# Patient Record
Sex: FEMALE | Race: BLACK OR AFRICAN AMERICAN | NOT HISPANIC OR LATINO | Employment: PART TIME | URBAN - METROPOLITAN AREA
[De-identification: names, ages, dates, MRNs, and addresses within clinical notes are randomized per-mention and may not be internally consistent; named-entity substitution may affect disease eponyms.]

---

## 2017-01-17 ENCOUNTER — OFFICE VISIT (OUTPATIENT)
Dept: FAMILY MEDICINE | Facility: CLINIC | Age: 32
End: 2017-01-17
Payer: COMMERCIAL

## 2017-01-17 VITALS
RESPIRATION RATE: 16 BRPM | DIASTOLIC BLOOD PRESSURE: 84 MMHG | TEMPERATURE: 98.9 F | OXYGEN SATURATION: 100 % | WEIGHT: 262 LBS | BODY MASS INDEX: 37.51 KG/M2 | HEIGHT: 70 IN | HEART RATE: 106 BPM | SYSTOLIC BLOOD PRESSURE: 122 MMHG

## 2017-01-17 DIAGNOSIS — L85.3 DRY SKIN DERMATITIS: ICD-10-CM

## 2017-01-17 DIAGNOSIS — G35 MS (MULTIPLE SCLEROSIS) (H): ICD-10-CM

## 2017-01-17 DIAGNOSIS — A08.4 VIRAL GASTROENTERITIS: Primary | ICD-10-CM

## 2017-01-17 DIAGNOSIS — Z87.410 HISTORY OF CERVICAL DYSPLASIA: ICD-10-CM

## 2017-01-17 PROCEDURE — 99214 OFFICE O/P EST MOD 30 MIN: CPT | Performed by: FAMILY MEDICINE

## 2017-01-17 RX ORDER — TRIAMCINOLONE ACETONIDE 1 MG/G
CREAM TOPICAL
Qty: 15 G | Refills: 1 | Status: SHIPPED | OUTPATIENT
Start: 2017-01-17 | End: 2017-08-31

## 2017-01-17 NOTE — Clinical Note
[address] [address]  Phone: [phone]    January 17, 2017        Stefania Austin  [address] [facility]   [facility] [address]          To whom it may concern:    RE: Stefania Austin    Patient was seen and treated today at our clinic and missed work.    Please contact me for questions or concerns.      Sincerely,        Porsha Veliz MD

## 2017-01-17 NOTE — PATIENT INSTRUCTIONS
You can take imodium AD as needed for diarrhea.       [facility]    If you have any questions regarding to your visit please contact your care team:       Team Red:   Clinic Hours Telephone Number   Dr. Porsha Palmer  (pediatrics)  Stella Rosario NP 7am-7pm  Monday - Thursday   7am-5pm  Fridays  [phone] (fax)    Carlos PHILLIP  [phone]   Urgent Care - [place] and [place] Monday-Friday  [place] - 11am-8pm  Saturday-Sunday  Both sites - 9am-5pm  [phone] - [facility]  [phone] - [place]       What options do I have for visits at the clinic other than the traditional office visit?  To expand how we care for you, many of our providers are utilizing electronic visits (e-visits) and telephone visits, when medically appropriate, for interactions with their patients rather than a visit in the clinic.   We also offer nurse visits for many medical concerns. Just like any other service, we will bill your insurance company for this type of visit based on time spent on the phone with your provider. Not all insurance companies cover these visits. Please check with your medical insurance if this type of visit is covered. You will be responsible for any charges that are not paid by your insurance.      E-visits via [employer]:  generally incur a $35.00 fee.  Telephone visits:  Time spent on the phone: *charged based on time that is spent on the phone in increments of 10 minutes. Estimated cost:   5-10 mins $30.00   11-20 mins. $59.00   21-30 mins. $85.00     As always, Thank you for trusting us with your health care needs!              Viral Gastroenteritis (Adult)    Gastroenteritis is commonly called the stomach flu. It is most often caused by a virus that affects the stomach and intestinal tract and usually lasts from 2 to 7 days. Common viruses causing gastroenteritis include norovirus, rotavirus, and hepatitis A. Non-viral causes of  gastroenteritis include bacteria, parasites, and toxins.  The danger from repeated vomiting or diarrhea is dehydration. This is the loss of too much fluid from the body. When this occurs, body fluids must be replaced. Antibiotics do not help with this illness because it is usually viral.Simple home treatment will be helpful.  Symptoms of viral gastroenteritis may include:    Watery, loose stools    Stomach pain or abdominal cramps    Fever and chills    Nausea and vomiting    Loss of bowel control    Headache  Home care  Gastroenteritis is transmitted by contact with the stool or vomit of an infected person. This can occur from person to person or from contact with a contaminated surface.  Follow these guidelines when caring for yourself at home:    If symptoms are severe, rest at home for the next 24 hours or until you are feeling better.    Wash your hands with soap and water or use alcohol-based  to prevent the spread of infection. Wash your hands after touching anyone who is sick.    Wash your hands or use alcohol-based  after using the toilet and before meals. Clean the toilet after each use.  Remember these tips when preparing food:    People with diarrhea should not prepare or serve food to others. When preparing foods, wash your hands before and after.    Wash your hands after using cutting boards, countertops, knives, or utensils that have been in contact with raw food.    Keep uncooked meats away from cooked and ready-to-eat foods.  Medicine  You may use acetaminophen or NSAID medicines like ibuprofen or naproxen to control fever unless another medicine was given. If you have chronic liver or kidney disease, talk with your healthcare provider before using these medicines. Also talk with your provider if you've had a stomach ulcer or gastrointestinal bleeding. Don't give aspirin to anyone under 18 years of age who is ill with a fever. It may cause severe liver damage. Don't use NSAIDS is  you are already taking one for another condition (like arthritis) or are on aspirin (such as for heart disease or after a stroke).  If medicine for vomiting or diarrhea are prescribed, take these only as directed. Do not take over-the-counter medicines for vomiting or diarrhea unless instructed by your healthcare provider.  Diet  Follow these guidelines for food:    Water and liquids are important so you don't get dehydrated. Drink a small amount at a time or suck on ice chips if you are vomiting.    If you eat, avoid fatty, greasy, spicy, or fried foods.    Don't eat dairy if you have diarrhea. This can make diarrhea worse.    Avoid tobacco, alcohol, and caffeine which may worsen symptoms.  During the first 24 hours (the first full day), follow the diet below:    Beverages. Sports drinks, soft drinks without caffeine, ginger ale, mineral water (plain or flavored), decaffeinated tea and coffee. If you are very dehydrated, sports drinks aren't a good choice. They have too much sugar and not enough electrolytes. In this case, commercially available products called oral rehydration solutions, are best.    Soups. Eat clear broth, consommé, and bouillon.    Desserts. Eat gelatin, popsicles, and fruit juice bars.  During the next 24 hours (the second day), you may add the following to the above:    Hot cereal, plain toast, bread, rolls, and crackers    Plain noodles, rice, mashed potatoes, chicken noodle or rice soup    Unsweetened canned fruit (avoid pineapple), bananas    Limit fat intake to less than 15 grams per day. Do this by avoiding margarine, butter, oils, mayonnaise, sauces, gravies, fried foods, peanut butter, meat, poultry, and fish.    Limit fiber and avoid raw or cooked vegetables, fresh fruits (except bananas), and bran cereals.    Limit caffeine and chocolate. Don't use spices or seasonings other than salt.    Limit dairy products.    Avoid alcohol.  During the next 24 hours:    Gradually resume a normal  diet as you feel better and your symptoms improve.    If at any time it starts getting worse again, go back to clear liquids until you feel better.  Follow-up care  Follow up with your healthcare provider, or as advised. Call your provider if you don't get better within 24 hours or if diarrhea lasts more than a week. Also follow up if you are unable to keep down liquids and get dehydrated. If a stool (diarrhea) sample was taken, call as directed for the results.  Call 911  Call 911 if any of these occur:    Trouble breathing    Chest pain    Confused    Severe drowsiness or trouble awakening    Fainting or loss of consciousness    Rapid heart rate    Seizure    Stiff neck  When to seek medical advice  Call your healthcare provider right away if any of these occur:    Abdominal pain that gets worse    Continued vomiting (unable to keep liquids down)    Frequent diarrhea (more than 5 times a day)    Blood in vomit or stool (black or red color)    Dark urine, reduced urine output, or extreme thirst    Weakness or dizziness    Drowsiness    Fever of 100.4 F (38 C) oral or higher that does not get better with fever medicine    New rash    5827-8517 [employer] [address] [place] [id number]. All rights reserved. This information is not intended as a substitute for professional medical care. Always follow your healthcare professional's instructions.

## 2017-01-17 NOTE — MR AVS SNAPSHOT
After Visit Summary   1/17/2017    Stefania Austin    MRN: [id number]           Patient Information     Date Of Birth          1985        Visit Information        Provider Department      1/17/2017 4:00 PM Porsha Veliz MD [facility]        Today's Diagnoses     Viral gastroenteritis    -  1     Dry skin dermatitis         MS (multiple sclerosis) (H)         History of cervical dysplasia           Care Instructions    You can take imodium AD as needed for diarrhea.       [facility]    If you have any questions regarding to your visit please contact your care team:       Team Red:   Clinic Hours Telephone Number   Dr. Porsha Palmer  (pediatrics)  Stella Rosario NP 7am-7pm  Monday - Thursday   7am-5pm  Fridays  [phone] (fax)    Carlos PHILLIP  [phone]   Urgent Care - [place] and [place] Monday-Friday  [place] - 11am-8pm  Saturday-Sunday  Both sites - 9am-5pm  [phone] - [facility]  [phone] - [place]       What options do I have for visits at the clinic other than the traditional office visit?  To expand how we care for you, many of our providers are utilizing electronic visits (e-visits) and telephone visits, when medically appropriate, for interactions with their patients rather than a visit in the clinic.   We also offer nurse visits for many medical concerns. Just like any other service, we will bill your insurance company for this type of visit based on time spent on the phone with your provider. Not all insurance companies cover these visits. Please check with your medical insurance if this type of visit is covered. You will be responsible for any charges that are not paid by your insurance.      E-visits via [employer]:  generally incur a $35.00 fee.  Telephone visits:  Time spent on the phone: *charged based on time that is spent on the phone in increments of 10 minutes. Estimated  cost:   5-10 mins $30.00   11-20 mins. $59.00   21-30 mins. $85.00     As always, Thank you for trusting us with your health care needs!              Viral Gastroenteritis (Adult)    Gastroenteritis is commonly called the stomach flu. It is most often caused by a virus that affects the stomach and intestinal tract and usually lasts from 2 to 7 days. Common viruses causing gastroenteritis include norovirus, rotavirus, and hepatitis A. Non-viral causes of gastroenteritis include bacteria, parasites, and toxins.  The danger from repeated vomiting or diarrhea is dehydration. This is the loss of too much fluid from the body. When this occurs, body fluids must be replaced. Antibiotics do not help with this illness because it is usually viral.Simple home treatment will be helpful.  Symptoms of viral gastroenteritis may include:    Watery, loose stools    Stomach pain or abdominal cramps    Fever and chills    Nausea and vomiting    Loss of bowel control    Headache  Home care  Gastroenteritis is transmitted by contact with the stool or vomit of an infected person. This can occur from person to person or from contact with a contaminated surface.  Follow these guidelines when caring for yourself at home:    If symptoms are severe, rest at home for the next 24 hours or until you are feeling better.    Wash your hands with soap and water or use alcohol-based  to prevent the spread of infection. Wash your hands after touching anyone who is sick.    Wash your hands or use alcohol-based  after using the toilet and before meals. Clean the toilet after each use.  Remember these tips when preparing food:    People with diarrhea should not prepare or serve food to others. When preparing foods, wash your hands before and after.    Wash your hands after using cutting boards, countertops, knives, or utensils that have been in contact with raw food.    Keep uncooked meats away from cooked and ready-to-eat  foods.  Medicine  You may use acetaminophen or NSAID medicines like ibuprofen or naproxen to control fever unless another medicine was given. If you have chronic liver or kidney disease, talk with your healthcare provider before using these medicines. Also talk with your provider if you've had a stomach ulcer or gastrointestinal bleeding. Don't give aspirin to anyone under 18 years of age who is ill with a fever. It may cause severe liver damage. Don't use NSAIDS is you are already taking one for another condition (like arthritis) or are on aspirin (such as for heart disease or after a stroke).  If medicine for vomiting or diarrhea are prescribed, take these only as directed. Do not take over-the-counter medicines for vomiting or diarrhea unless instructed by your healthcare provider.  Diet  Follow these guidelines for food:    Water and liquids are important so you don't get dehydrated. Drink a small amount at a time or suck on ice chips if you are vomiting.    If you eat, avoid fatty, greasy, spicy, or fried foods.    Don't eat dairy if you have diarrhea. This can make diarrhea worse.    Avoid tobacco, alcohol, and caffeine which may worsen symptoms.  During the first 24 hours (the first full day), follow the diet below:    Beverages. Sports drinks, soft drinks without caffeine, ginger ale, mineral water (plain or flavored), decaffeinated tea and coffee. If you are very dehydrated, sports drinks aren't a good choice. They have too much sugar and not enough electrolytes. In this case, commercially available products called oral rehydration solutions, are best.    Soups. Eat clear broth, consommé, and bouillon.    Desserts. Eat gelatin, popsicles, and fruit juice bars.  During the next 24 hours (the second day), you may add the following to the above:    Hot cereal, plain toast, bread, rolls, and crackers    Plain noodles, rice, mashed potatoes, chicken noodle or rice soup    Unsweetened canned fruit (avoid  pineapple), bananas    Limit fat intake to less than 15 grams per day. Do this by avoiding margarine, butter, oils, mayonnaise, sauces, gravies, fried foods, peanut butter, meat, poultry, and fish.    Limit fiber and avoid raw or cooked vegetables, fresh fruits (except bananas), and bran cereals.    Limit caffeine and chocolate. Don't use spices or seasonings other than salt.    Limit dairy products.    Avoid alcohol.  During the next 24 hours:    Gradually resume a normal diet as you feel better and your symptoms improve.    If at any time it starts getting worse again, go back to clear liquids until you feel better.  Follow-up care  Follow up with your healthcare provider, or as advised. Call your provider if you don't get better within 24 hours or if diarrhea lasts more than a week. Also follow up if you are unable to keep down liquids and get dehydrated. If a stool (diarrhea) sample was taken, call as directed for the results.  Call 911  Call 911 if any of these occur:    Trouble breathing    Chest pain    Confused    Severe drowsiness or trouble awakening    Fainting or loss of consciousness    Rapid heart rate    Seizure    Stiff neck  When to seek medical advice  Call your healthcare provider right away if any of these occur:    Abdominal pain that gets worse    Continued vomiting (unable to keep liquids down)    Frequent diarrhea (more than 5 times a day)    Blood in vomit or stool (black or red color)    Dark urine, reduced urine output, or extreme thirst    Weakness or dizziness    Drowsiness    Fever of 100.4 F (38 C) oral or higher that does not get better with fever medicine    New rash    7179-7197 [employer]. [address] [place] [address], [place] [address]. All rights reserved. This information is not intended as a substitute for professional medical care. Always follow your healthcare professional's instructions.              Follow-ups after your visit        Follow-up notes from your care  team     Return in about 20 months (around 10/1/2018), or if symptoms worsen or fail to improve, for physical (fasting labs up to one week prior).      Your next 10 appointments already scheduled     Cayetano 15, 2017 11:30 AM   (Arrive by 11:15 AM)   MR CERVICAL SPINE W/O & W CONTRAST with [address] [facility] ([facility] [place])    [address] [facility]  1st Floor  [facility] [address] [phone]           Take your medicines as usual, unless your doctor tells you not to. Bring a list of your current medicines to your exam (including vitamins, minerals and over-the-counter drugs).  You will be given intravenous contrast for this exam. To prepare:   The day before your exam, drink extra fluids at least six 8-ounce glasses (unless your doctor tells you to restrict your fluids).   Have a blood test (creatinine test) within 30 days of your exam. Go to your clinic or Diagnostic Imaging Department for this test.  The MRI machine uses a strong magnet. Please wear clothes without metal (snaps, zippers). A sweatsuit works well, or we may give you a hospital gown.  Please remove any body piercings and hair extensions before you arrive. You will also remove watches, jewelry, hairpins, wallets, dentures, partial dental plates and hearing aids. You may wear contact lenses, and you may be able to wear your rings. We have a safe place to keep your personal items, but it is safer to leave them at home.   **IMPORTANT** THE INSTRUCTIONS BELOW ARE ONLY FOR THOSE PATIENTS WHO HAVE BEEN TOLD THEY WILL RECEIVE SEDATION OR GENERAL ANESTHESIA DURING THEIR MRI PROCEDURE:  IF YOU WILL RECEIVE SEDATION (take medicine to help you relax during your exam):   You must get the medicine from your doctor before you arrive. Bring the medicine to the exam. Do not take it at home.   Arrive one hour early. Bring someone who can take you home after the test. Your medicine will make you sleepy. After the exam, you may  not drive, take a bus or take a taxi by yourself.   No eating 8 hours before your exam. You may have clear liquids up until 4 hours before your exam. (Clear liquids include water, clear tea, black coffee and fruit juice without pulp.)  IF YOU WILL RECEIVE ANESTHESIA (be asleep for your exam):   Arrive 1 1/2 hours early. Bring someone who can take you home after the test. You may not drive, take a bus or take a taxi by yourself.   No eating 8 hours before your exam. You may have clear liquids up until 4 hours before your exam. (Clear liquids include water, clear tea, black coffee and fruit juice without pulp.)  Please call the Imaging Department at your exam site with any questions.            Cayetano 15, 2017 12:15 PM   (Arrive by 12:00 PM)   MR BRAIN W/O & W CONTRAST with [address] MRI ([email or web address] and [facility] [place])    [address] [address] [phone]           Take your medicines as usual, unless your doctor tells you not to. Bring a list of your current medicines to your exam (including vitamins, minerals and over-the-counter drugs).  You will be given intravenous contrast for this exam. To prepare:   The day before your exam, drink extra fluids at least six 8-ounce glasses (unless your doctor tells you to restrict your fluids).   Have a blood test (creatinine test) within 30 days of your exam. Go to your clinic or Diagnostic Imaging Department for this test.  The MRI machine uses a strong magnet. Please wear clothes without metal (snaps, zippers). A sweatsuit works well, or we may give you a hospital gown.  Please remove any body piercings and hair extensions before you arrive. You will also remove watches, jewelry, hairpins, wallets, dentures, partial dental plates and hearing aids. You may wear contact lenses, and you may be able to wear your rings. We have a safe place to keep your personal items, but it is safer to leave them at home.    **IMPORTANT** THE INSTRUCTIONS BELOW ARE ONLY FOR THOSE PATIENTS WHO HAVE BEEN TOLD THEY WILL RECEIVE SEDATION OR GENERAL ANESTHESIA DURING THEIR MRI PROCEDURE:  IF YOU WILL RECEIVE SEDATION (take medicine to help you relax during your exam):   You must get the medicine from your doctor before you arrive. Bring the medicine to the exam. Do not take it at home.   Arrive one hour early. Bring someone who can take you home after the test. Your medicine will make you sleepy. After the exam, you may not drive, take a bus or take a taxi by yourself.   No eating 8 hours before your exam. You may have clear liquids up until 4 hours before your exam. (Clear liquids include water, clear tea, black coffee and fruit juice without pulp.)  IF YOU WILL RECEIVE ANESTHESIA (be asleep for your exam):   Arrive 1 1/2 hours early. Bring someone who can take you home after the test. You may not drive, take a bus or take a taxi by yourself.   No eating 8 hours before your exam. You may have clear liquids up until 4 hours before your exam. (Clear liquids include water, clear tea, black coffee and fruit juice without pulp.)  Please call the Imaging Department at your exam site with any questions.            Cayetano 15, 2017  1:30 PM   (Arrive by 1:15 PM)   Return Multiple Sclerosis with Tico Peace MD   [facility] Multiple Sclerosis ([facility])    [address] [place] [address] [phone]              Who to contact     If you have questions or need follow up information about today's clinic visit or your schedule please contact [facility] directly at [phone].  Normal or non-critical lab and imaging results will be communicated to you by MyChart, letter or phone within 4 business days after the clinic has received the results. If you do not hear from us within 7 days, please contact the clinic through MyChart or phone. If you have a critical or abnormal lab  "result, we will notify you by phone as soon as possible.  Submit refill requests through [employer] or call your pharmacy and they will forward the refill request to us. Please allow 3 business days for your refill to be completed.          Additional Information About Your Visit        [employer] Information     [employer] gives you secure access to your electronic health record. If you see a primary care provider, you can also send messages to your care team and make appointments. If you have questions, please call your primary care clinic.  If you do not have a primary care provider, please call [phone] and they will assist you.        Care EveryWhere ID     This is your Care EveryWhere ID. This could be used by other organizations to access your [facility] medical records  [id number]        Your Vitals Were     Pulse Temperature Respirations Height BMI (Body Mass Index) Pulse Oximetry    106 98.9  F (37.2  C) 16 5' 10\" (1.778 m) 37.59 kg/m2 100%       Blood Pressure from Last 3 Encounters:   01/17/17 122/84   12/15/16 130/83   07/20/16 114/74    Weight from Last 3 Encounters:   01/17/17 262 lb (118.842 kg)   12/15/16 254 lb 14.4 oz (115.622 kg)   07/20/16 257 lb (116.574 kg)              Today, you had the following     No orders found for display         Today's Medication Changes          These changes are accurate as of: 1/17/17  4:55 PM.  If you have any questions, ask your nurse or doctor.               Start taking these medicines.        Dose/Directions    triamcinolone 0.1 % cream   Commonly known as:  KENALOG   Used for:  Dry skin dermatitis   Started by:  Porsha Veliz MD        Apply to affected area sparingly twice daily as needed   Quantity:  15 g   Refills:  1            Where to get your medicines      These medications were sent to [facility] - [address] [facility]  [address] [facility] [address], [place] [address]     Phone:  [phone]    - triamcinolone 0.1 % " cream             Primary Care Provider Office Phone # Fax #    Porsha Veliz [phone]       [address]  [place] [address]        Thank you!     Thank you for choosing [facility]  for your care. Our goal is always to provide you with excellent care. Hearing back from our patients is one way we can continue to improve our services. Please take a few minutes to complete the written survey that you may receive in the mail after your visit with us. Thank you!             Your Updated Medication List - Protect others around you: Learn how to safely use, store and throw away your medicines at www.disposemymeds.org.          This list is accurate as of: 1/17/17  4:55 PM.  Always use your most recent med list.                   Brand Name Dispense Instructions for use    triamcinolone 0.1 % cream    KENALOG    15 g    Apply to affected area sparingly twice daily as needed

## 2017-01-17 NOTE — NURSING NOTE
"Chief Complaint   Patient presents with     Sick       Initial /84 mmHg  Pulse 106  Temp(Src) 98.9  F (37.2  C)  Resp 16  Ht 5' 10\" (1.778 m)  Wt 262 lb (118.842 kg)  BMI 37.59 kg/m2  SpO2 100% Estimated body mass index is 37.59 kg/(m^2) as calculated from the following:    Height as of this encounter: 5' 10\" (1.778 m).    Weight as of this encounter: 262 lb (118.842 kg).  BP completed using cuff size: large right arm    Susy Paul. MA      "

## 2017-01-17 NOTE — PROGRESS NOTES
"  SUBJECTIVE:                                                    Stefania Austin is a 31 year old obese female with MS who presents to clinic today for the following health issues:    Acute Illness   Acute illness concerns: vomiting   Onset: 1 day     Fever: no     Chills/Sweats: YES - chills     Headache (location?): YES    Sinus Pressure:no    Conjunctivitis:  no    Ear Pain: no    Rhinorrhea: no    Congestion: no    Sore Throat: YES     Cough: no    Wheeze: no     Decreased Appetite: no     Nausea: YES    Vomiting: YES    Diarrhea:  YES    Dysuria/Freq.: no     Fatigue/Achiness: YES    Sick/Strep Exposure: YES -  kids     Therapies Tried and outcome: sleep    I have reviewed the patient's medical history in detail and updated the computerized patient record.     ROS:  CONSTITUTIONAL:POSITIVE  for chills  INTEGUMENTARY/SKIN: pink itchy lesion on right thigh   E/M: NEGATIVE for ear, mouth and throat problems  R: NEGATIVE for significant cough or SOB  GI: see HPI   : NEGATIVE for frequency, dysuria, or hematuria  M: NEGATIVE for significant arthralgias or myalgia  NEURO: MS     OBJECTIVE:                                                    /84 mmHg  Pulse 106  Temp(Src) 98.9  F (37.2  C)  Resp 16  Ht 5' 10\" (1.778 m)  Wt 262 lb (118.842 kg)  BMI 37.59 kg/m2  SpO2 100%  Body mass index is 37.59 kg/(m^2).  GENERAL: alert, no distress and obese  EYES: Eyes grossly normal to inspection, PERRL and conjunctivae and sclerae normal  HENT: ear canals and TM's normal, nose and mouth without ulcers or lesions  NECK: no adenopathy, no asymmetry, masses, or scars and thyroid normal to palpation  RESP: lungs clear to auscultation - no rales, rhonchi or wheezes  CV: regular rate and rhythm, normal S1 S2, no S3 or S4, no murmur, click or rub, no peripheral edema and peripheral pulses strong  ABDOMEN: soft, nontender, no hepatosplenomegaly, no masses and bowel sounds normal  MS: no gross musculoskeletal " defects noted, no edema  SKIN: fine pink plaque without scale or central clearing on right anterior thigh   PSYCH: mentation appears normal, affect normal/bright    Diagnostic Test Results:  Results for orders placed or performed in visit on 12/15/16   MRI Brain w & w/o contrast    Narrative    Brain MR without and with contrast     History: Multiple sclerosis    Comparison: Brain MRI 6/9/2016      Technique: Sagittal T2 FLAIR, axial T2 FLAIR, axial diffusion  weighted, and axial T1-weighted images of the brain were obtained  without the administration of intravenous contrast. After the  administration of intravenous contrast, axial T2-weighted, axial T2*,  axial and coronal T1-weighted, and coronal T2 FLAIR images of the  brain were obtained.    Contrast: 10mL Gadavist    Findings:   Greater than 20 foci of patchy and confluent T2 hyperintense foci  within the periventricular, deep and subcortical white matter of both  cerebral hemispheres. Many of these lesions demonstrate radial  orientation to the ventricular system, compatible with history of  multiple sclerosis. Numerous patchy T2 hyperintense foci within the  bilateral middle cerebellar peduncles and brainstem, left greater than  right. Stable precontrast T1 hyperintense/T2 hyperintense focus within  the left aspect of the tectum, presumably sequelae of chronic  demyelinating lesion.     New punctate nonenhancing T2 hyperintense focus in the left frontal  centrum semiovale (series 4, image 18).     New 6 mm nodular focus of enhancement and T2 hyperintense signal  abnormality in the posterior right frontal periventricular white  matter (4, image 17).     New 3 mm enhancing lesion within the anterior left frontal subcortical  white matter (series 13, image 7).    Resolution of previously seen incomplete rim-enhancing lesions within  the right parieto-occipital periventricular white matter.     No significant parenchymal volume loss. No abnormal foci of marked  T1  hypointensity. Ventricles are not enlarged out of proportion to the  cerebral sulci. Patent major intracranial vascular flow voids. No  intracranial hemorrhage, mass effect or abnormal extra-axial fluid  collection.    Right maxillary sinus polypoid mucosal thickening. Mastoid air cells  are clear. Orbits are unremarkable.      Impression    Impression: Findings compatible with history of multiple sclerosis.  New right frontal periventricular and subcortical enhancing lesions  and nonenhancing left frontal T2 hyperintense lesion, consistent with  interval/active demyelination since 6/9/2016. Resolution of previously  seen right right parieto-occipital enhancing demyelinating lesions.       PANCHO ANAYA MD        ASSESSMENT/PLAN:                                                    (A08.4) Viral gastroenteritis  (primary encounter diagnosis)  Plan: She is instructed to push clear fluids and small amounts frequently until improving, then advance diet as tolerated. May use Gatorade for rehydration and Immodium OTC as needed for diarrhea. Call or return for office visit as needed if symptoms are not responding as expected, or for high fever, significant abdominal pain, or bloody stool.     (L85.3) Dry skin dermatitis  Plan: triamcinolone (KENALOG) 0.1 % cream        Call or return to clinic as needed if these symptoms worsen or fail to improve as anticipated.     (G35) MS (multiple sclerosis) (H)  Plan: follow-up with neurology as planned     (Z87.410) History of cervical dysplasia  Plan: pap smear due in 10/2018     See Patient Instructions    Porsha Veliz MD  [facility]

## 2017-02-02 ENCOUNTER — TELEPHONE (OUTPATIENT)
Dept: NEUROLOGY | Facility: CLINIC | Age: 32
End: 2017-02-02

## 2017-02-02 NOTE — TELEPHONE ENCOUNTER
Prior Authorization Specialty Medication Request    Medication/Dose: Plegridy 125mcg  Diagnosis and ICD: Relapsing Remitting Multiple Sclerosis, G35  New/Renewal/Insurance Change PA: Insurance change    Important Lab Values: n/a    Previously Tried and Failed Therapies: Copaxone     Rationale: Continuation of current disease modifying therapy for demyelinating disease, currently clinically stable on, please approve.    Would you like to include any research articles? n/a   If yes please include the hyperlink(s) below or fax @ [phone].    (Include Name and MRN)    If you received a fax notification from an outside Pharmacy;  Pharmacy Name: n/a  Pharmacy #: n/a  Pharmacy Fax: n/a    CoverMyMeds Key: [id number]

## 2017-02-02 NOTE — TELEPHONE ENCOUNTER
[facility] Prior Authorization Team   Phone: [phone]  Fax: [phone]    [place] Initiation    Medication: Plegridy 125mcg  Insurance Company: [employer] [facility] - Phone [phone] Fax [phone]  Pharmacy Filling the Rx: [facility] - [place], [place] - [address]  Filling Pharmacy Phone: [phone]  Filling Pharmacy Fax: [phone]  Start Date: 2/2/2017

## 2017-02-07 NOTE — TELEPHONE ENCOUNTER
PRIOR AUTHORIZATION DENIED    Medication: Plegridy 125mcg - Denied    Denial Date: 2/3/2017    Denial Rational: ***    Appeal Information: ***

## 2017-02-09 NOTE — TELEPHONE ENCOUNTER
Prior Authorization Approval    Authorization Effective Date: 2/8/2017  Authorization Expiration Date: 2/8/2019  Medication: Plegridy 125mcg APPROVED  Approved Dose/Quantity: 2 PER 28 DAYS  Reference #:  [id number]  Insurance Company: [employer] - Phone [phone] Fax [phone]  Expected CoPay:       CoPay Card Available:      Foundation Assistance Needed:    Which Pharmacy is filling the prescription (Not needed for infusion/clinic administered): [facility] - [place] [address]  Pharmacy Notified: Yes  Patient Notified: Yes, detailed vm

## 2017-03-10 ENCOUNTER — TELEPHONE (OUTPATIENT)
Dept: NEUROLOGY | Facility: CLINIC | Age: 32
End: 2017-03-10

## 2017-03-10 NOTE — TELEPHONE ENCOUNTER
I received the following message from the triage nurse:    SE are rash-red itchy at injection site after each injection. She does rotate sites but gets rash every time. Has tried cream and didn't help.     I called Stefania to get additional information. Unfortunately Stefania did not answer. I left a VMM with our clinic number to call us back. Will route to Dr. Peace for input.    Chantale Ceja, MS RN Care Coordinator

## 2017-03-10 NOTE — TELEPHONE ENCOUNTER
1) If she has not done so already, she should contact the [employer] nurse to get their input.    2) If she is not doing so, she should try icing the injection sites after injection (put some ice in a paper towel and hold against the skin until the skin is cool).     3) She could also try taking an over the counter anti-histamine such as Claritin (one pill) 30 minutes prior to injection to cut down on itching. (Benadryl can be used too, but is sedating.)    If she has already tried all of the above and it is still bothering her, then she has to decide if the injection site reactions are bothering her enough that she wants to switch drugs. Almost all patients on Copaxone have some degree of injection site reactions, and usually they can be managed with conservative measures as above, but some people find them much more bothersome than others. The downside of contemplating a change is that all of the other drugs require regular blood test monitoring and have more potential side effects.

## 2017-03-13 NOTE — TELEPHONE ENCOUNTER
I called Stefania to talk with her about Dr. Peace's input. Unfortunately Stefania did not answer. I left a VMM with Dr. Peace's input along with the number for [employer]. I also left her with our clinic call back number if she had any other questions or concerns.     Chantale Ceja, MS RN Care Coordinator

## 2017-04-03 ENCOUNTER — TELEPHONE (OUTPATIENT)
Dept: NEUROLOGY | Facility: CLINIC | Age: 32
End: 2017-04-03

## 2017-04-03 NOTE — TELEPHONE ENCOUNTER
I received a message stating that our [facility] was trying to reach us to discuss side effect management for Stefania with her Plegridy; We had tried reaching her earlier this month regarding this management, but she never answered her phone, nor did she call our clinic back; I called the pharmacy back, and they ultimately have had the same issue, she called asking about these side effects, but never returned their call; They will reach out to her to go through with her Dr Peace's recommendations.    Luz Shirley, MS RN Care Coordinator

## 2017-06-12 ENCOUNTER — TELEPHONE (OUTPATIENT)
Dept: NEUROLOGY | Facility: CLINIC | Age: 32
End: 2017-06-12

## 2017-06-12 NOTE — TELEPHONE ENCOUNTER
I received the following message from the call center:    Pt is requesting a call back to discuss her medications. She stated that she has questions.    I called Stefania to see what her questions were. Unfortunately Stefania did not answer and her VMM box was not set up. I will attempt to call Stefania tomorrow.     Chantale Ceja MS RN Care Coordinator

## 2017-06-13 NOTE — TELEPHONE ENCOUNTER
I attempted to call Stefania back today to see what questions she had regarding her medication. I was unable to contact Stefania and unable to leave a VMM, as her VM box was not set up.    Chantale Ceja MS RN Care Coordinator

## 2017-08-12 ENCOUNTER — HEALTH MAINTENANCE LETTER (OUTPATIENT)
Age: 32
End: 2017-08-12

## 2017-08-31 ENCOUNTER — OFFICE VISIT (OUTPATIENT)
Dept: NEUROLOGY | Facility: CLINIC | Age: 32
End: 2017-08-31
Attending: PSYCHIATRY & NEUROLOGY
Payer: COMMERCIAL

## 2017-08-31 VITALS — HEIGHT: 70 IN | SYSTOLIC BLOOD PRESSURE: 124 MMHG | HEART RATE: 75 BPM | DIASTOLIC BLOOD PRESSURE: 81 MMHG

## 2017-08-31 DIAGNOSIS — G35 MS (MULTIPLE SCLEROSIS) (H): Primary | ICD-10-CM

## 2017-08-31 PROCEDURE — 99211 OFF/OP EST MAY X REQ PHY/QHP: CPT | Mod: ZF

## 2017-08-31 RX ORDER — PEGINTERFERON BETA-1A 125 UG/.5ML
INJECTION, SOLUTION SUBCUTANEOUS
COMMUNITY
Start: 2017-08-18 | End: 2018-01-11

## 2017-08-31 ASSESSMENT — PAIN SCALES - GENERAL: PAINLEVEL: NO PAIN (0)

## 2017-08-31 NOTE — LETTER
8/31/2017      RE: Stefania Austin  [address]   [facility] [address]     Referral source: Established patient     Chief complaint: Multiple sclerosis.      Assisted by:  YADI Kinney DO, PGY-4 Neurology resident.      History of the Present Illness: Ms. Stefania Austin is a 32-year-old woman who returns to the Multiple Sclerosis Clinic today for regularly scheduled follow-up for diagnosis of relapsing-remitting multiple sclerosis.  I was assisted in this evaluation today by Dr. Kinney and his documentation should be considered integral to this note.      The patient's history is as per my previous notes.  She presented with an episode of facial weakness in the summer of 2015 and a sixth nerve palsy in November 2015 Subsequent MRI imaging and CSF examination were consistent with a diagnosis of multiple sclerosis.  She started disease-modifying therapy with glatiramer acetate in mid-2016, but follow-up MRI imaging 6 months later demonstrated ongoing accumulation of active inflammatory demyelinating lesions, and at that time we decided to transition her to the Plegridy formulation of interferon beta.      The patient remains on Plegridy.  She reports good compliance with the medication.  She takes ibuprofen prior to the injections and is not finding flu-like side effects to be particularly distressing, although she is bothered by injection site reactions after the shots.      I reviewed MRI scans of the brain and cervical spine performed on 08/28/2017.  MRI scan of the brain demonstrates several periventricular and juxtacortical foci of T2 hyperintensity in the white matter of the cerebral hemispheres with additional T2 hyperintensity at the left pontine/middle cerebellar peduncle junction that are unchanged from previous MRI of 12/15/2016.  There has been interval resolution of enhancing signal abnormality in the right posterior frontal white matter in comparison to the earlier study.  A  single possibly enhancing focus is noted at the right mesencephalic/pontine junction, but is not correlated with any clear T2 signal abnormality and may be artifactual.  No other abnormal enhancement is seen.  MRI scan of the cervical spine demonstrates a probable focus of demyelination at C5 that is less well appreciated on this study than on the previous image of 12/15/2016.  There are no new lesions or abnormal enhancement pattern on the current study.      Review of Systems: The patient continues to experience urinary urgency with occasional urge-associated urinary incontinence.  She has not tried medication for this condition.      Past Medical History:  1.  Iron-deficiency anemia.   2.  Hyperlipidemia.   3.  Cervical dysplasia.   4.  Vitamin D deficiency.   5.  Status post thymectomy for benign thymic hyperplasia.     Physical examination is as per Dr. Kinney and was nonfocal.      Assessment/plan:    1.  Relapsing-remitting multiple sclerosis      The patient remains clinically stable as regards any new evidence of MS relapse.  I do not see compelling evidence of new inflammatory demyelinating disease activity on her recent MRI either.      She is having some difficulty tolerating treatment with Plegridy, primarily due to painful injection site reactions.  I discussed several things that she could try to attempt to limit the discomfort after injections, including trying an antihistamine such as diphenhydramine or loratadine as well as icing the injection sites after each shot.  However, it is not likely that any strategy is going to make the injection site reactions go away altogether.      I also discussed with her whether to consider an alternative disease-modifying therapy.  She is considering future pregnancy in the future and, accordingly, fingolimod and teriflunomide are likely not the best choices for her given the known or strongly suspected teratogenicity of these agents.     I did speak to her  about dimethyl fumarate.  Again, one advantage of this agent is that it has a very short half-life and does not require a washout prior to planned conception, although we would not advise its use during pregnancy, as for all disease-modifying  therapies in general.  Common side effects include flushing, which is usually not more than a nuisance, as well as gastrointestinal side effects can occasionally be severe and limiting to tolerance of the medication.  There is also an association of dimethyl fumarate treatment with a few cases of a serious and potentially fatal viral infection of the brain called progressive multifocal leukoencephalopathy (PML).  All cases to date have occurred in patients who developed some degree of lymphopenia on this medication, which is not a universal occurrence with this drug.  I cannot state confidently that there is no risk in patients whose blood counts remain normal on dimethyl fumarate, but this risk (if present) appears to be quite small.      She also asked about possibility of being off of disease-modifying therapy.  I told the patient that I would not advise this based on the amount of inflammatory disease activity seen previously on her MRI.  I think that there is at least a 90% chance that she would have additional clinical relapses of multiple sclerosis on no treatment, and this could be associated with accumulation of substantial functional disability over time.      After discussion, she elects to remain on Plegridy at present, and I think that is an appropriate decision.  We will check studies for routine laboratory monitoring today to include vitamin D, liver function tests and             blood counts and we will contact her with those results when available.  She will return to clinic in 6 months for a review.     Tico Peace MD   of Neurology  [facility]    Cc:  Porsha Veliz MD  (PCP)  Patient

## 2017-08-31 NOTE — MR AVS SNAPSHOT
After Visit Summary   8/31/2017    Stefania Austin    MRN: [id number]           Patient Information     Date Of Birth          1985        Visit Information        Provider Department      8/31/2017 9:00 AM Tico Peace MD [facility]        Today's Diagnoses     MS (multiple sclerosis) (H)    -  1      Care Instructions    1. Continue Plegridy    2. You can try taking Benadryl (diphenhydramine) 25 mg or Claritin (loratadine) 10 mg after injections to see if this is helpful with injection site reactions    3. Labs (blood work) today    4. Return to clinic in six months          Follow-ups after your visit        Follow-up notes from your care team     Return in about 6 months (around 2/28/2018).      Your next 10 appointments already scheduled     Sep 01, 2017  7:15 PM CDT   Lab with  LAB   [facility] ([facility])    [address]  1st Floor  [facility] [address]   [phone]            Sep 12, 2017  5:40 PM CDT   PHYSICAL with Porsha Veliz MD   [facility] ([facility]    [address] [place] [address]   [phone]            Mar 01, 2018  4:30 PM CST   (Arrive by 4:15 PM)   Return Multiple Sclerosis with Tico Peace MD   [facility] ([facility])    [address] [place] [address]  3rd Floor  [facility] [address]   [phone]              Future tests that were ordered for you today     Open Future Orders        Priority Expected Expires Ordered    CBC with platelets differential Routine 8/31/2017 11/30/2017 8/31/2017    Hepatic panel Routine 8/31/2017 11/30/2017 8/31/2017    Vitamin D Deficiency Routine 8/31/2017 11/30/2017 8/31/2017            Who to contact     If you have questions or need follow up information about today's clinic visit or your schedule please contact [facility] directly at [phone].  Normal or  "non-critical lab and imaging results will be communicated to you by MyChart, letter or phone within 4 business days after the clinic has received the results. If you do not hear from us within 7 days, please contact the clinic through [facility] or phone. If you have a critical or abnormal lab result, we will notify you by phone as soon as possible.  Submit refill requests through [employer] or call your pharmacy and they will forward the refill request to us. Please allow 3 business days for your refill to be completed.          Additional Information About Your Visit        [employer] Information     [employer] gives you secure access to your electronic health record. If you see a primary care provider, you can also send messages to your care team and make appointments. If you have questions, please call your primary care clinic.  If you do not have a primary care provider, please call [phone] and they will assist you.        Care EveryWhere ID     This is your Care EveryWhere ID. This could be used by other organizations to access your [facility] medical records  [id number]        Your Vitals Were     Pulse Height                75 1.778 m (5' 10\")           Blood Pressure from Last 3 Encounters:   08/31/17 124/81   01/17/17 122/84   12/15/16 130/83    Weight from Last 3 Encounters:   01/17/17 118.8 kg (262 lb)   12/15/16 115.6 kg (254 lb 14.4 oz)   07/20/16 116.6 kg (257 lb)               Primary Care Provider Office Phone # Fax #    Porsha Veliz [phone] [address] [facility] [address]        Equal Access to Services     [facility]: Hadii aad ku hadasho Soomaali, waaxda luqadaha, qaybta kaalmada analia, malcolm pereira . So [facility] [phone].    ATENCIÓN: Si habla español, tiene a hurtado disposición servicios gratuitos de asistencia lingüística. Llame al [phone].    We comply with applicable federal civil rights laws and [place] laws. We do not " discriminate on the basis of race, color, national origin, age, disability sex, sexual orientation or gender identity.            Thank you!     Thank you for choosing [facility]  for your care. Our goal is always to provide you with excellent care. Hearing back from our patients is one way we can continue to improve our services. Please take a few minutes to complete the written survey that you may receive in the mail after your visit with us. Thank you!             Your Updated Medication List - Protect others around you: Learn how to safely use, store and throw away your medicines at www.disposemymeds.org.          This list is accurate as of: 8/31/17 10:16 AM.  Always use your most recent med list.                   Brand Name Dispense Instructions for use Diagnosis    PLEGRIDY 125 MCG/0.5ML Sopn prefilled pen   Generic drug:  peginterferon beta-1a

## 2017-08-31 NOTE — PATIENT INSTRUCTIONS
1. Continue Plegridy    2. You can try taking Benadryl (diphenhydramine) 25 mg or Claritin (loratadine) 10 mg after injections to see if this is helpful with injection site reactions    3. Labs (blood work) today    4. Return to clinic in six months

## 2017-08-31 NOTE — NURSING NOTE
"Chief Complaint   Patient presents with     RECHECK     Multiple Sclerosis       Initial /81  Pulse 75  Ht 1.778 m (5' 10\") Estimated body mass index is 37.59 kg/(m^2) as calculated from the following:    Height as of 1/17/17: 1.778 m (5' 10\").    Weight as of 1/17/17: 118.8 kg (262 lb).  Medication Reconciliation: complete   Horacio Bradley, DANIKA    "

## 2017-08-31 NOTE — PROGRESS NOTES
[facility] [place]  Neurology [facility]    Patient Name:  Stefania Austin  MRN:  [id number]    :  1985  Date of Service:  2017        History of Present Illness:   Stefania Austin is a 32 year old female who presents with Diagnosed RRMS who had initial presentation in  with seventh nerve palsy and later a sixth nerve palsy.  MRI and CSF (+oligoclonal bands) was suggestive for MS.  She was started on Glatiramer acetate intially but continued to have new symptoms with MRI lesions burden increase.  She was switched to Plegridy in 2016 and has continued on this medication up unto this follow up visit.    Interval History:  The patient reports no new visual symptoms, nor weakness, sensory changes, or moments of imbalance. She indicates the injections are leaving marks as well as puffing up her skin for up to one day after the delivery.  The previous redness and itching has subsided, but she is wondering if anything can be done about this.  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  She isn't necessarily interested in switching medications unless absolutely indicated due to her concern of risk of infection with immunosuppression.  Her bladder symptoms have continued, and she describes being unable to make it to the bathroom in time to urinate.  This is not painful, but she feels socially embarrassed about her condition.  She has yet to try the medications previously recommended and will consider this if the symptoms continue to progress, as she feels this is more a nuisance than a condition she would like to medicate with.    ROS:   A 10-point ROS is negative unless indicated above.      Allergies:  No Known Allergies    Medications:    Current Outpatient Prescriptions   Medication     PLEGRIDY 125 MCG/0.5ML SOPN prefilled pen     Past  "Medical/Surgical History:  Past Medical History:   Diagnosis Date     ASCUS with positive high risk HPV 2012     Bell's palsy      Cervical dysplasia 2012     Genital herpes      Hyperlipidemia LDL goal < 160      Hypertension goal BP (blood pressure) < 140/90      Major depression, single episode      Microcytic anemia      MS (multiple sclerosis) (H) 12/3/2015     Obesity 5/20/2014     Vitamin D deficiency      Past Surgical History:   Procedure Laterality Date     DAVINCI THYMECTOMY N/A 5/9/2016    Procedure: DAVINCI THYMECTOMY;  Surgeon: Leighton Estrella MD;  Location:  OR     Physical Examination:    Vitals: /81  Pulse 75  Ht 1.778 m (5' 10\")  General: Cooperative, NAD  HEENT: Sclerae anicteric, MMM, neck supple   Cardiac: RRR, no m/r/g  Chest: CTAB, no wheezes or crackles  Abdomen: S/NT/ND  Extremities: Distal pulses intact. No LE edema.    Neurologic:     Mental Status: Fully alert, attentive and oriented. Speech clear and fluent, comprehension intact. No neglect or extinction.     Cranial Nerves: Visual fields intact. PERRL. EOMI with normal smooth pursuit. Facial sensation intact/symmetric. Facial movements symmetric. Hearing not formally tested but intact to conversation. Palate elevation symmetric, uvula midline. No dysarthria. Shoulder shrug strong bilaterally. Tongue protrusion midline.     Motor: No involuntary movements observed. Normal tone. No pronator drift. Strength 5/5 throughout upper and lower extremities. Normal rapid finger tapping.     Deep Tendon Reflexes: 2+ and symmetric throughout. No clonus. Toes downgoing.     Sensory: Light touch, pinprick, vibratory and proprioceptive sensation intact/symmetric throughout upper and lower extremities. Negative Romberg.      Coordination: ROGER, FNF and HS intact without dysmetria.     Station/Gait: Normal casual gait.     Pertinent Investigations:  All labs and imaging reviewed.  MRI brain 8/28/2017: while there is a subtle image " finding of contrast enhancement in the left lindsay, it is relatively minimal and without T2 signal correlation.      Assessment and Plan:  # RRMS: the patient has a new lesion reported on MRI today while on Plegridy, but we wouldn't think this is representative for treatment failure, and more imaging will be needed to fully determine if this enhancing lesions was real or not.  Her tolerance of the medication could be improved with use of Claritin or antihistamine, and we advised the patient that if she didn't want to switch to Tecfidera at this time that she should continue with Plegridy instead of going off medications.  She was informed that considering her previous imaging an symptoms, she would have a 90-95% to have further lesion development and likely a lesion that would cause a neurological deficit.  She was in agreement to continue with current Plegridy dosing.  Plan to repeat imaging in six months with six month follow up.  - Continue Plegridy  - CMP, CBC  - MRI w/wout contrast brain and c-spine in 6 months    # Vitamin D deficiency: previous levels 28 on 7/2016. Patient on supplementation (4000 units per day) but may robinson to be adjusted if levels are still low.   - Vitamin D deficiency screen    # Urge incontinence: again we offered medication treatment but the patient would like to hold off on this for now.  - We are available to prescribe Ditropan if needed      Patient was seen and discussed with attending neurologist, MD Saloni Dunbar  PGY4 Neurology resident    Attending physician: I saw and evaluated the patient with the resident and I agree with his findings and plan of care as documented above. Please see my note for additional details.    Tico Peace MD

## 2017-09-01 NOTE — PROGRESS NOTES
Referral source: Established patient     Chief complaint: Multiple sclerosis.      Assisted by:  YADI Kinney DO, PGY-4 Neurology resident.      History of the Present Illness: Ms. Stefania Austin is a 32-year-old woman who returns to the [facility]clerosis Clinic today for regularly scheduled follow-up for diagnosis of relapsing-remitting multiple sclerosis.  I was assisted in this evaluation today by Dr. Kinney and his documentation should be considered integral to this note.      The patient's history is as per my previous notes.  She presented with an episode of facial weakness in the summer of 2015 and a sixth nerve palsy in November 2015 Subsequent MRI imaging and CSF examination were consistent with a diagnosis of multiple sclerosis.  She started disease-modifying therapy with glatiramer acetate in mid-2016, but follow-up MRI imaging 6 months later demonstrated ongoing accumulation of active inflammatory demyelinating lesions, and at that time we decided to transition her to the Plegridy formulation of interferon beta.      The patient remains on Plegridy.  She reports good compliance with the medication.  She takes ibuprofen prior to the injections and is not finding flu-like side effects to be particularly distressing, although she is bothered by injection site reactions after the shots.      I reviewed MRI scans of the brain and cervical spine performed on 08/28/2017.  MRI scan of the brain demonstrates several periventricular and juxtacortical foci of T2 hyperintensity in the white matter of the cerebral hemispheres with additional T2 hyperintensity at the left pontine/middle cerebellar peduncle junction that are unchanged from previous MRI of 12/15/2016.  There has been interval resolution of enhancing signal abnormality in the right posterior frontal white matter in comparison to the earlier study.  A single possibly enhancing focus is noted at the right mesencephalic/pontine junction, but  is not correlated with any clear T2 signal abnormality and may be artifactual.  No other abnormal enhancement is seen.  MRI scan of the cervical spine demonstrates a probable focus of demyelination at C5 that is less well appreciated on this study than on the previous image of 12/15/2016.  There are no new lesions or abnormal enhancement pattern on the current study.      Review of Systems: The patient continues to experience urinary urgency with occasional urge-associated urinary incontinence.  She has not tried medication for this condition.      Past Medical History:  1.  Iron-deficiency anemia.   2.  Hyperlipidemia.   3.  Cervical dysplasia.   4.  Vitamin D deficiency.   5.  Status post thymectomy for benign thymic hyperplasia.     Physical examination is as per Dr. Kinney and was nonfocal.      Assessment/plan:    1.  Relapsing-remitting multiple sclerosis      The patient remains clinically stable as regards any new evidence of MS relapse.  I do not see compelling evidence of new inflammatory demyelinating disease activity on her recent MRI either.      She is having some difficulty tolerating treatment with Plegridy, primarily due to painful injection site reactions.  I discussed several things that she could try to attempt to limit the discomfort after injections, including trying an antihistamine such as diphenhydramine or loratadine as well as icing the injection sites after each shot.  However, it is not likely that any strategy is going to make the injection site reactions go away altogether.      I also discussed with her whether to consider an alternative disease-modifying therapy.  She is considering future pregnancy in the future and, accordingly, fingolimod and teriflunomide are likely not the best choices for her given the known or strongly suspected teratogenicity of these agents.     I did speak to her about dimethyl fumarate.  Again, one advantage of this agent is that it has a very short  half-life and does not require a washout prior to planned conception, although we would not advise its use during pregnancy, as for all disease-modifying  therapies in general.  Common side effects include flushing, which is usually not more than a nuisance, as well as gastrointestinal side effects can occasionally be severe and limiting to tolerance of the medication.  There is also an association of dimethyl fumarate treatment with a few cases of a serious and potentially fatal viral infection of the brain called progressive multifocal leukoencephalopathy (PML).  All cases to date have occurred in patients who developed some degree of lymphopenia on this medication, which is not a universal occurrence with this drug.  I cannot state confidently that there is no risk in patients whose blood counts remain normal on dimethyl fumarate, but this risk (if present) appears to be quite small.      She also asked about possibility of being off of disease-modifying therapy.  I told the patient that I would not advise this based on the amount of inflammatory disease activity seen previously on her MRI.  I think that there is at least a 90% chance that she would have additional clinical relapses of multiple sclerosis on no treatment, and this could be associated with accumulation of substantial functional disability over time.      After discussion, she elects to remain on Plegridy at present, and I think that is an appropriate decision.  We will check studies for routine laboratory monitoring today to include vitamin D, liver function tests and blood counts and we will contact her with those results when available.  She will return to clinic in 6 months for a review.         PRO VALENCIA MD             D: 2017 14:41   T: 2017 15:38   MT: KATE      Name:     BUBBA HERNÁNDEZ   MRN:      [id number]        Account:      [id number]   :      1985           Service Date: 2017      Document:  [id number]

## 2017-09-19 ENCOUNTER — TELEPHONE (OUTPATIENT)
Dept: NEUROLOGY | Facility: CLINIC | Age: 32
End: 2017-09-19

## 2017-09-19 NOTE — LETTER
September 28, 2017    Stefania Austin  [address]   [facility] [address]      Dear Stefania,    We have tried to contact you regarding the lab tests that were supposed to be drawn after your 8/31/17 appointment with Dr. Peace. These blood tests are important in monitoring the effects of Plegridy on your system. Please have these done at any [place] location at your earliest convenience.     Please reach out with any questions.    Sincerely,    MD Christine Reyes, MS RN Care Coordinator  Multiple Sclerosis Center  [facility]  [address]  [place] [address]  Clinic: [phone] Fax: [phone]

## 2017-09-19 NOTE — TELEPHONE ENCOUNTER
[facility] Prior Authorization Team   Phone: [phone]  Fax: [phone]    PA Initiation    Medication: Plegridy 125mcg/0.5ml Pen - PA Initiated  Insurance Company: [place] Medicaid ([facility]) - Phone [phone] Fax [phone]  Pharmacy Filling the Rx: [facility] - [place], [place] - [facility] [address]  Filling Pharmacy Phone: [phone]  Filling Pharmacy Fax: [phone]  Start Date: 9/19/2017

## 2017-09-19 NOTE — TELEPHONE ENCOUNTER
Per Dr. Peace, pt never had her labs drawn after her clinic appointment on 8/31/17. Called and left VMM stating that these need to be drawn at her earliest convenience at any [facility] location.    Christine Curiel, KENJI Care Coordinator

## 2017-09-19 NOTE — TELEPHONE ENCOUNTER
Received notification from insurance, will follow up on 9/21 if we don't receive a response by then:

## 2017-09-20 NOTE — TELEPHONE ENCOUNTER
PA approved.  Effective date: 09/19/2017-09/30/2017  PA reference #: [id number]  Pt. notified:   Yes   Pt. informed directly.    [facility] Prior Authorization Team   Phone: [phone]  Fax: [phone]

## 2017-10-12 NOTE — TELEPHONE ENCOUNTER
Per Dr. Peace: We called and sent a letter to this patient last month regarding need for blood tests on Plegridy (she has never had these done since starting the medication >6 months ago). I can't have her on this drug and non-compliant with recommended testing.       Please call her and inform her that she needs to call us back and/or have these labs done within the next two weeks or we will be obliged to hold future refills until the labs are done.     Called patient and left VM stating the above and that lab orders are in and she can get them drawn at any [facility] location.     Christine Curiel, RN Care Coordinator

## 2017-10-21 DIAGNOSIS — G35 MS (MULTIPLE SCLEROSIS) (H): ICD-10-CM

## 2017-10-21 LAB
ALBUMIN SERPL-MCNC: 3.3 G/DL (ref 3.4–5)
ALP SERPL-CCNC: 82 U/L (ref 40–150)
ALT SERPL W P-5'-P-CCNC: 21 U/L (ref 0–50)
AST SERPL W P-5'-P-CCNC: 18 U/L (ref 0–45)
BASOPHILS # BLD AUTO: 0 10E9/L (ref 0–0.2)
BASOPHILS NFR BLD AUTO: 0.2 %
BILIRUB DIRECT SERPL-MCNC: <0.1 MG/DL (ref 0–0.2)
BILIRUB SERPL-MCNC: 0.4 MG/DL (ref 0.2–1.3)
DIFFERENTIAL METHOD BLD: ABNORMAL
EOSINOPHIL # BLD AUTO: 0.1 10E9/L (ref 0–0.7)
EOSINOPHIL NFR BLD AUTO: 1.5 %
ERYTHROCYTE [DISTWIDTH] IN BLOOD BY AUTOMATED COUNT: 15.9 % (ref 10–15)
HCT VFR BLD AUTO: 34.4 % (ref 35–47)
HGB BLD-MCNC: 10.8 G/DL (ref 11.7–15.7)
IMM GRANULOCYTES # BLD: 0 10E9/L (ref 0–0.4)
IMM GRANULOCYTES NFR BLD: 0 %
LYMPHOCYTES # BLD AUTO: 2.5 10E9/L (ref 0.8–5.3)
LYMPHOCYTES NFR BLD AUTO: 46.8 %
MCH RBC QN AUTO: 22 PG (ref 26.5–33)
MCHC RBC AUTO-ENTMCNC: 31.4 G/DL (ref 31.5–36.5)
MCV RBC AUTO: 70 FL (ref 78–100)
MONOCYTES # BLD AUTO: 0.4 10E9/L (ref 0–1.3)
MONOCYTES NFR BLD AUTO: 8.3 %
NEUTROPHILS # BLD AUTO: 2.3 10E9/L (ref 1.6–8.3)
NEUTROPHILS NFR BLD AUTO: 43.2 %
NRBC # BLD AUTO: 0 10*3/UL
NRBC BLD AUTO-RTO: 0 /100
PLATELET # BLD AUTO: 276 10E9/L (ref 150–450)
PROT SERPL-MCNC: 7.4 G/DL (ref 6.8–8.8)
RBC # BLD AUTO: 4.9 10E12/L (ref 3.8–5.2)
WBC # BLD AUTO: 5.3 10E9/L (ref 4–11)

## 2017-10-23 LAB — DEPRECATED CALCIDIOL+CALCIFEROL SERPL-MC: 44 UG/L (ref 20–75)

## 2017-11-01 ENCOUNTER — TELEPHONE (OUTPATIENT)
Dept: NEUROLOGY | Facility: CLINIC | Age: 32
End: 2017-11-01

## 2017-11-01 NOTE — TELEPHONE ENCOUNTER
I sent her a letter the other day, not sure if she has received that yet.    In any case, her vitamin D level is normal (44) but below the goal range of 60-80. I would increase the vitamin D dose by an additional 2000 units per day (for example, if she is taking 4000 units daily increase to 6000 units daily).    Blood counts are consistent with iron deficiency anemia--in women of child-bearing age this is usually due to menstrual blood loss. This was present before she started Plegridy and is unrelated to the medication--she should discuss whether she should be on iron supplementation with her primary care physician.     The rest of her tests were normal. She can continue Plegridy.

## 2017-11-01 NOTE — TELEPHONE ENCOUNTER
Called and left patient a detailed VMM reporting lab results per Dr. Peace's input. Instructed her to call if she has further questions.    Christine Curiel, RN Care Coordinator

## 2017-11-01 NOTE — TELEPHONE ENCOUNTER
Patient called in wanting to discuss lab results. Dr. Peace, please review and let me know what to relay to the patient.     Thank you,    Christine Curiel, RN Care Coordinator

## 2018-01-02 ENCOUNTER — TELEPHONE (OUTPATIENT)
Dept: NEUROLOGY | Facility: CLINIC | Age: 33
End: 2018-01-02

## 2018-01-02 NOTE — TELEPHONE ENCOUNTER
[facility] Prior Authorization Team   Phone: [phone]  Fax: [phone]    PA Initiation    Medication: Plegridy - Initiated   Insurance Company: [employer] - Phone [phone] Fax [phone]  Pharmacy Filling the Rx: [facility] - [place], [place] - [address]  Filling Pharmacy Phone: [phone]  Filling Pharmacy Fax: [phone]  Start Date: 1/2/2018

## 2018-01-03 NOTE — TELEPHONE ENCOUNTER
[facility] Prior Authorization Team   Phone: [phone]  Fax: [phone]    Prior Authorization Approval    Authorization Effective Date: 1/2/2018  Authorization Expiration Date: 1/3/2019  Medication: Plegridy - Approved  Approved Dose/Quantity: 1 per 28 days  Reference #: Key: [id number]   Insurance Company: [employer] - Phone [phone] Fax [phone]  Expected CoPay: $3.00     CoPay Card Available: No   Foundation Assistance Needed: N/A  Which Pharmacy is filling the prescription (Not needed for infusion/clinic administered): [facility] - [place], [place] - [facility] [address]  Pharmacy Notified: Yes  Patient Notified: Yes

## 2018-01-11 DIAGNOSIS — G35 MULTIPLE SCLEROSIS (H): Primary | ICD-10-CM

## 2018-01-11 RX ORDER — PEGINTERFERON BETA-1A 125 UG/.5ML
INJECTION, SOLUTION SUBCUTANEOUS
Qty: 1 ML | Refills: 11 | Status: SHIPPED | OUTPATIENT
Start: 2018-01-11 | End: 2018-06-11

## 2018-01-11 NOTE — TELEPHONE ENCOUNTER
Received refill request for Plegridy from [facility]; Patient was last seen in August and has follow up appointment in March with Dr. Peace. Refilled for 1 year per [facility] refill protocol.    Christine Curiel RN Care Coordinator

## 2018-03-08 ENCOUNTER — OFFICE VISIT (OUTPATIENT)
Dept: NEUROLOGY | Facility: CLINIC | Age: 33
End: 2018-03-08
Attending: PSYCHIATRY & NEUROLOGY
Payer: COMMERCIAL

## 2018-03-08 VITALS
BODY MASS INDEX: 40.09 KG/M2 | SYSTOLIC BLOOD PRESSURE: 120 MMHG | WEIGHT: 280 LBS | DIASTOLIC BLOOD PRESSURE: 76 MMHG | OXYGEN SATURATION: 100 % | RESPIRATION RATE: 24 BRPM | HEART RATE: 75 BPM | HEIGHT: 70 IN | TEMPERATURE: 97.3 F

## 2018-03-08 DIAGNOSIS — M79.89 LEFT LEG SWELLING: ICD-10-CM

## 2018-03-08 DIAGNOSIS — G35 MULTIPLE SCLEROSIS (H): Primary | ICD-10-CM

## 2018-03-08 DIAGNOSIS — G35 MULTIPLE SCLEROSIS (H): ICD-10-CM

## 2018-03-08 LAB
ALBUMIN SERPL-MCNC: 3.6 G/DL (ref 3.4–5)
ALP SERPL-CCNC: 87 U/L (ref 40–150)
ALT SERPL W P-5'-P-CCNC: 21 U/L (ref 0–50)
AST SERPL W P-5'-P-CCNC: 17 U/L (ref 0–45)
BASOPHILS # BLD AUTO: 0 10E9/L (ref 0–0.2)
BASOPHILS NFR BLD AUTO: 0.2 %
BILIRUB DIRECT SERPL-MCNC: <0.1 MG/DL (ref 0–0.2)
BILIRUB SERPL-MCNC: 0.2 MG/DL (ref 0.2–1.3)
DIFFERENTIAL METHOD BLD: ABNORMAL
EOSINOPHIL # BLD AUTO: 0.1 10E9/L (ref 0–0.7)
EOSINOPHIL NFR BLD AUTO: 0.9 %
ERYTHROCYTE [DISTWIDTH] IN BLOOD BY AUTOMATED COUNT: 16.1 % (ref 10–15)
HCT VFR BLD AUTO: 35.7 % (ref 35–47)
HGB BLD-MCNC: 11.3 G/DL (ref 11.7–15.7)
IMM GRANULOCYTES # BLD: 0 10E9/L (ref 0–0.4)
IMM GRANULOCYTES NFR BLD: 0.2 %
LYMPHOCYTES # BLD AUTO: 3.3 10E9/L (ref 0.8–5.3)
LYMPHOCYTES NFR BLD AUTO: 50.7 %
MCH RBC QN AUTO: 22.5 PG (ref 26.5–33)
MCHC RBC AUTO-ENTMCNC: 31.7 G/DL (ref 31.5–36.5)
MCV RBC AUTO: 71 FL (ref 78–100)
MONOCYTES # BLD AUTO: 0.6 10E9/L (ref 0–1.3)
MONOCYTES NFR BLD AUTO: 9 %
NEUTROPHILS # BLD AUTO: 2.5 10E9/L (ref 1.6–8.3)
NEUTROPHILS NFR BLD AUTO: 39 %
NRBC # BLD AUTO: 0 10*3/UL
NRBC BLD AUTO-RTO: 0 /100
PLATELET # BLD AUTO: 284 10E9/L (ref 150–450)
PROT SERPL-MCNC: 8.2 G/DL (ref 6.8–8.8)
RBC # BLD AUTO: 5.03 10E12/L (ref 3.8–5.2)
TSH SERPL DL<=0.005 MIU/L-ACNC: 1.37 MU/L (ref 0.4–4)
WBC # BLD AUTO: 6.4 10E9/L (ref 4–11)

## 2018-03-08 PROCEDURE — 84443 ASSAY THYROID STIM HORMONE: CPT | Performed by: PSYCHIATRY & NEUROLOGY

## 2018-03-08 PROCEDURE — 85025 COMPLETE CBC W/AUTO DIFF WBC: CPT | Performed by: PSYCHIATRY & NEUROLOGY

## 2018-03-08 PROCEDURE — 36415 COLL VENOUS BLD VENIPUNCTURE: CPT | Performed by: PSYCHIATRY & NEUROLOGY

## 2018-03-08 PROCEDURE — 82306 VITAMIN D 25 HYDROXY: CPT | Performed by: PSYCHIATRY & NEUROLOGY

## 2018-03-08 PROCEDURE — 80076 HEPATIC FUNCTION PANEL: CPT | Performed by: PSYCHIATRY & NEUROLOGY

## 2018-03-08 PROCEDURE — G0463 HOSPITAL OUTPT CLINIC VISIT: HCPCS | Mod: ZF

## 2018-03-08 RX ORDER — IBUPROFEN 800 MG/1
800 TABLET, FILM COATED ORAL PRN
COMMUNITY
Start: 2017-02-02 | End: 2021-03-02

## 2018-03-08 ASSESSMENT — PAIN SCALES - GENERAL: PAINLEVEL: MILD PAIN (2)

## 2018-03-08 NOTE — MR AVS SNAPSHOT
After Visit Summary   3/8/2018    Stefania Austin    MRN: [id number]           Patient Information     Date Of Birth          1985        Visit Information        Provider Department      3/8/2018 4:30 PM Tico Peace MD [facility]        Today's Diagnoses     Multiple sclerosis (H)    -  1      Care Instructions    1. Lab tests (blood work) today    2. Return in six months with MRI scans of the brain and cervical spine    3. Continue Plegridy for now          Follow-ups after your visit        Follow-up notes from your care team     Return in about 6 months (around 9/8/2018).      Your next 10 appointments already scheduled     Mar 08, 2018  5:30 PM CST   LAB with  LAB   [facility] ([facility])    [address] [place] [address] [phone]           Please do not eat 10-12 hours before your appointment if you are coming in fasting for labs on lipids, cholesterol, or glucose (sugar). This does not apply to pregnant women. Water, hot tea and black coffee (with nothing added) are okay. Do not drink other fluids, diet soda or chew gum.            Sep 06, 2018  1:00 PM CDT   (Arrive by 12:45 PM)   MR CERVICAL SPINE W/O & W CONTRAST with [facility] ([facility])    [address] [place] [id number] [phone]           Take your medicines as usual, unless your doctor tells you not to. Bring a list of your current medicines to your exam (including vitamins, minerals and over-the-counter drugs).  You may or may not receive intravenous (IV) contrast for this exam pending the discretion of the Radiologist.  You do not need to do anything special to prepare.  The MRI machine uses a strong magnet. Please wear clothes without metal (snaps, zippers). A sweatsuit works well, or we may give you a hospital gown.  Please remove any body piercings and  hair extensions before you arrive. You will also remove watches, jewelry, hairpins, wallets, dentures, partial dental plates and hearing aids. You may wear contact lenses, and you may be able to wear your rings. We have a safe place to keep your personal items, but it is safer to leave them at home.  **IMPORTANT** THE INSTRUCTIONS BELOW ARE ONLY FOR THOSE PATIENTS WHO HAVE BEEN PRESCRIBED SEDATION OR GENERAL ANESTHESIA DURING THEIR MRI PROCEDURE:  IF YOUR DOCTOR PRESCRIBED ORAL SEDATION (take medicine to help you relax during your exam):   You must get the medicine from your doctor (oral medication) before you arrive. Bring the medicine to the exam. Do not take it at home. You ll be told when to take it upon arriving for your exam.   Arrive one hour early. Bring someone who can take you home after the test. Your medicine will make you sleepy. After the exam, you may not drive, take a bus or take a taxi by yourself.  IF YOUR DOCTOR PRESCRIBED IV SEDATION:   Arrive one hour early. Bring someone who can take you home after the test. Your medicine will make you sleepy. After the exam, you may not drive, take a bus or take a taxi by yourself.   No eating 6 hours before your exam. You may have clear liquids up until 4 hours before your exam. (Clear liquids include water, clear tea, black coffee and fruit juice without pulp.)  IF YOUR DOCTOR PRESCRIBED ANESTHESIA (be asleep for your exam):   Arrive 1 1/2 hours early. Bring someone who can take you home after the test. You may not drive, take a bus or take a taxi by yourself.   No eating 8 hours before your exam. You may have clear liquids up until 4 hours before your exam. (Clear liquids include water, clear tea, black coffee and fruit juice without pulp.)   You will spend four to five hours in the recovery room.  Please call the Imaging Department at your exam site with any questions.            Sep 06, 2018  1:45 PM CDT   (Arrive by 1:30 PM)   MR BRAIN W/O & W CONTRAST  with [id number]   [facility] ([facility])    [address] [facility]  1st Floor  [facility] [address] [phone]           Take your medicines as usual, unless your doctor tells you not to. Bring a list of your current medicines to your exam (including vitamins, minerals and over-the-counter drugs).  You may or may not receive intravenous (IV) contrast for this exam pending the discretion of the Radiologist.  You do not need to do anything special to prepare.  The MRI machine uses a strong magnet. Please wear clothes without metal (snaps, zippers). A sweatsuit works well, or we may give you a hospital gown.  Please remove any body piercings and hair extensions before you arrive. You will also remove watches, jewelry, hairpins, wallets, dentures, partial dental plates and hearing aids. You may wear contact lenses, and you may be able to wear your rings. We have a safe place to keep your personal items, but it is safer to leave them at home.  **IMPORTANT** THE INSTRUCTIONS BELOW ARE ONLY FOR THOSE PATIENTS WHO HAVE BEEN PRESCRIBED SEDATION OR GENERAL ANESTHESIA DURING THEIR MRI PROCEDURE:  IF YOUR DOCTOR PRESCRIBED ORAL SEDATION (take medicine to help you relax during your exam):   You must get the medicine from your doctor (oral medication) before you arrive. Bring the medicine to the exam. Do not take it at home. You ll be told when to take it upon arriving for your exam.   Arrive one hour early. Bring someone who can take you home after the test. Your medicine will make you sleepy. After the exam, you may not drive, take a bus or take a taxi by yourself.  IF YOUR DOCTOR PRESCRIBED IV SEDATION:   Arrive one hour early. Bring someone who can take you home after the test. Your medicine will make you sleepy. After the exam, you may not drive, take a bus or take a taxi by yourself.   No eating 6 hours before your exam. You may have clear liquids up until 4 hours before your  exam. (Clear liquids include water, clear tea, black coffee and fruit juice without pulp.)  IF YOUR DOCTOR PRESCRIBED ANESTHESIA (be asleep for your exam):   Arrive 1 1/2 hours early. Bring someone who can take you home after the test. You may not drive, take a bus or take a taxi by yourself.   No eating 8 hours before your exam. You may have clear liquids up until 4 hours before your exam. (Clear liquids include water, clear tea, black coffee and fruit juice without pulp.)   You will spend four to five hours in the recovery room.  Please call the Imaging Department at your exam site with any questions.            Sep 06, 2018  3:30 PM CDT   (Arrive by 3:15 PM)   Return Multiple Sclerosis with Tico Peace MD   [facility] Multiple Sclerosis ([facility])    [address] [place], [place] [id number] [phone]              Future tests that were ordered for you today     Open Future Orders        Priority Expected Expires Ordered    Vitamin D Deficiency Screening Routine 3/8/2018 6/7/2018 3/8/2018    CBC with platelets differential Routine 3/8/2018 6/7/2018 3/8/2018    Hepatic panel Routine 3/8/2018 6/7/2018 3/8/2018    TSH with free T4 reflex Routine 3/8/2018 6/7/2018 3/8/2018    MRI Brain w & w/o contrast Routine 9/6/2018 3/8/2019 3/8/2018    MRI Cervical spine w & w/o contrast Routine 9/6/2018 3/8/2019 3/8/2018            Who to contact     If you have questions or need follow up information about today's clinic visit or your schedule please contact [facility] directly at [phone].  Normal or non-critical lab and imaging results will be communicated to you by MyChart, letter or phone within 4 business days after the clinic has received the results. If you do not hear from us within 7 days, please contact the clinic through MyChart or phone. If you have a critical or abnormal lab result, we will notify you by phone as soon as possible.  Submit  "refill requests through [employer] or call your pharmacy and they will forward the refill request to us. Please allow 3 business days for your refill to be completed.          Additional Information About Your Visit        [employer] Information     [employer] gives you secure access to your electronic health record. If you see a primary care provider, you can also send messages to your care team and make appointments. If you have questions, please call your primary care clinic.  If you do not have a primary care provider, please call [phone] and they will assist you.        Care EveryWhere ID     This is your Care EveryWhere ID. This could be used by other organizations to access your [facility] medical records  [id number]        Your Vitals Were     Pulse Temperature Respirations Height Pulse Oximetry Breastfeeding?    75 97.3  F (36.3  C) (Oral) 24 1.778 m (5' 10\") 100% No    BMI (Body Mass Index)                   40.18 kg/m2            Blood Pressure from Last 3 Encounters:   03/08/18 120/76   08/31/17 124/81   01/17/17 122/84    Weight from Last 3 Encounters:   03/08/18 127 kg (280 lb)   01/17/17 118.8 kg (262 lb)   12/15/16 115.6 kg (254 lb 14.4 oz)               Primary Care Provider Office Phone # Fax #    Porsha Veliz [phone] [address] [facility] [address]        Equal Access to Services     [facility]: Hadii aad ku hadasho Soomaali, waaxda luqadaha, qaybta kaalmada adeegyada, malcolm pereira . So [facility] [phone].    ATENCIÓN: Si habla español, tiene a hurtado disposición servicios gratuitos de asistencia lingüística. Brain al [phone].    We comply with applicable federal civil rights laws and [place] laws. We do not discriminate on the basis of race, color, national origin, age, disability, sex, sexual orientation, or gender identity.            Thank you!     Thank you for choosing [facility]  for your care. Our goal is " always to provide you with excellent care. Hearing back from our patients is one way we can continue to improve our services. Please take a few minutes to complete the written survey that you may receive in the mail after your visit with us. Thank you!             Your Updated Medication List - Protect others around you: Learn how to safely use, store and throw away your medicines at www.disposemymeds.org.          This list is accurate as of 3/8/18  5:27 PM.  Always use your most recent med list.                   Brand Name Dispense Instructions for use Diagnosis    ibuprofen 800 MG tablet    ADVIL/MOTRIN     Take 800 mg by mouth as needed        PLEGRIDY 125 MCG/0.5ML Sopn prefilled pen   Generic drug:  peginterferon beta-1a     1 mL    INJECT THE CONTENTS OF ONE PEN(125MCG) UNDER THE SKIN EVERY OTHER WEEK    Multiple sclerosis (H)       WOMENS MULTIVITAMIN PLUS PO      Take 1 tablet by mouth daily

## 2018-03-08 NOTE — LETTER
3/8/2018      RE: Stefania Austin  [address]   [facility] [address]       Referral source: Established patient     Chief complaint: Multiple sclerosis     History of the Present Illness: Ms. Stefania Austin is a 32-year-old right-handed woman who returns to the Multiple Sclerosis Clinic today for regularly scheduled follow-up.      The patient's history is as per my previous notes.  She initially presented with symptoms of demyelinating disease in the summer of 2015 when she had an episode of facial weakness.  Several months later, she also had a 6th nerve palsy and MRI and CSF examinations at that time were consistent with a diagnosis of multiple sclerosis.  She was initiated on disease-modifying therapy with glatiramer acetate about 6 months after that, but had ongoing disease activity on that medication by MRI and then transitioned to the Plegridy formulation of interferon beta.      Today, the patient reports that she remains on Plegridy.  She is still noticing some injection site reactions as well as flu-like symptoms such as chills on the day after the injection.  She is taking ibuprofen for flu-like symptoms with partial relief.  I suggested to her that she might try taking 2 naproxen tablets 30 minutes prior to the injection and then again in the morning as needed as this NSAID is a little longer lasting and may be more effective in suppressing flu-like symptoms.      She denies any new episodic changes in vision, balance, strength or sensation suggestive of new relapse of multiple sclerosis since she was last seen.  Symptomatically, the patient has noted some asymmetric swelling of her left leg.  She had been in to an urgent care and had an ultrasound of the leg that was apparently negative for deep venous thrombosis. It was suggested that she follow up with a general practitioner regarding this issue, but she has not done so as of yet.  She has also noted that if she lies on the right arm  at night during sleep, she will develop a tingling sensation in the right arm.  This is only precipitated by putting pressure on that side and she cannot reproduce the tingling sensation by any movement of the neck.  I told her that this sounds consistent with a peripheral nerve compression and I do not think this is related to her MS diagnosis.      Past Medical History:  1.  Iron-deficiency anemia.   2.  Hyperlipidemia.   3.  Cervical dysplasia.   4.  Vitamin D deficiency.   5.  Status post thymectomy for benign thymic hyperplasia.      PHYSICAL EXAMINATION:   VITAL SIGNS:  Blood pressure 120/76; pulse 75; weight 127 kg; height 1.78 meters.   GENERAL:  Morbidly obese woman who presents to the examination alone, awake and alert and in no acute distress.  MUSCULOSKELETAL: The left leg does look somewhat larger than the right to visual inspection. There is no pitting edema.   NEUROLOGIC:   MENTAL STATUS:  Alert and oriented times four.   CRANIAL NERVES:  Visual fields are full to confrontation.  Extraocular movements are intact with no internuclear ophthalmoplegia.  Facial strength is normal.  Hearing is normal for conversational purposes.  Palate elevation and tongue protrusion are normal.   POWER:  Strength is normal (5/5) in the following muscles/groups:  Deltoids, biceps, triceps, wrist extensors, finger extensors, first dorsal interosseous, hip flexors, hamstrings and anterior tibialis.   REFLEXES:  Reflexes are symmetric and within normal limits at biceps, triceps, brachioradialis, knees and ankles.   MOTOR/CEREBELLAR:  There are no tremors, myoclonus or other abnormal movements.  Tone is grossly normal in the limbs.  There is no appendicular ataxia on finger-to-nose testing and rapid alternating movements are within normal limits in the extremities.  There is no pronator drift in the arms.   GAIT:  The patient is able to ambulate on a flat, level surface without difficulty.  She can walk on heels, toes and in  tandem.      Assessment/plan:     1.  Relapsing-remitting multiple sclerosis  The patient has been clinically stable over the last 6 months as regards any evidence of new MS relapse, and her examination is also stable and unchanged today.  We are going to check studies for routine laboratory monitoring including blood counts, liver function tests, TSH and vitamin D today.  I will advise her on supplementation as needed to maintain her vitamin D level in the goal range of 60-80.  At present, she is only taking a multiple vitamin supplement and likely this will not be sufficient to keep her vitamin D level in the desired range.      I am going to see her back in 6 months with MRI scans of the brain and cervical spine to assess the radiologic stability of her condition.  We discussed that if she were to have evidence of recurrent active inflammatory disease activity, there are multiple other medications that could be considered including oral medications such as fingolimod as well as IV therapies.  The downside of these treatments is they do carry low but no-nzero risks of serious side effects such as opportunistic infections, and if we are able to maintain disease stability on a very safe medication such as Plegridy that would likely be the best case scenario.     2.  Left leg swelling   Her left calf does look somewhat larger as compared to the right, although she does not have pitting edema as one might expect with a vascular lesion.  Follow up with primary care is advisable.     Tico Peace MD   of Neurology  [facility]    Cc:  Porsha Veliz MD (PCP)  Patient

## 2018-03-08 NOTE — PATIENT INSTRUCTIONS
1. Lab tests (blood work) today    2. Return in six months with MRI scans of the brain and cervical spine    3. Continue Plegridy for now

## 2018-03-08 NOTE — NURSING NOTE
"Chief Complaint   Patient presents with     RECHECK     [facility]- MULTIPLE SCLEROSIS, 6 MONTHS F/U       Initial /76  Pulse 75  Temp 97.3  F (36.3  C) (Oral)  Resp 24  Ht 1.778 m (5' 10\")  Wt 127 kg (280 lb)  SpO2 100%  Breastfeeding? No  BMI 40.18 kg/m2 Estimated body mass index is 40.18 kg/(m^2) as calculated from the following:    Height as of this encounter: 1.778 m (5' 10\").    Weight as of this encounter: 127 kg (280 lb).  Medication Reconciliation: complete     Ishaan Leone, CMA      "

## 2018-03-09 LAB — DEPRECATED CALCIDIOL+CALCIFEROL SERPL-MC: 41 UG/L (ref 20–75)

## 2018-03-09 NOTE — PROGRESS NOTES
Referral source: Established patient     Chief complaint: Multiple sclerosis     History of the Present Illness: Ms. Stefania Austin is a 32-year-old right-handed woman who returns to the Multiple Sclerosis Clinic today for regularly scheduled follow-up.      The patient's history is as per my previous notes.  She initially presented with symptoms of demyelinating disease in the summer of 2015 when she had an episode of facial weakness.  Several months later, she also had a 6th nerve palsy and MRI and CSF examinations at that time were consistent with a diagnosis of multiple sclerosis.  She was initiated on disease-modifying therapy with glatiramer acetate about 6 months after that, but had ongoing disease activity on that medication by MRI and then transitioned to the Plegridy formulation of interferon beta.      Today, the patient reports that she remains on Plegridy.  She is still noticing some injection site reactions as well as flu-like symptoms such as chills on the day after the injection.  She is taking ibuprofen for flu-like symptoms with partial relief.  I suggested to her that she might try taking 2 naproxen tablets 30 minutes prior to the injection and then again in the morning as needed as this NSAID is a little longer lasting and may be more effective in suppressing flu-like symptoms.      She denies any new episodic changes in vision, balance, strength or sensation suggestive of new relapse of multiple sclerosis since she was last seen.  Symptomatically, the patient has noted some asymmetric swelling of her left leg.  She had been in to an urgent care and had an ultrasound of the leg that was apparently negative for deep venous thrombosis. It was suggested that she follow up with a general practitioner regarding this issue, but she has not done so as of yet.  She has also noted that if she lies on the right arm at night during sleep, she will develop a tingling sensation in the right arm.  This is  only precipitated by putting pressure on that side and she cannot reproduce the tingling sensation by any movement of the neck.  I told her that this sounds consistent with a peripheral nerve compression and I do not think this is related to her MS diagnosis.      Past Medical History:  1.  Iron-deficiency anemia.   2.  Hyperlipidemia.   3.  Cervical dysplasia.   4.  Vitamin D deficiency.   5.  Status post thymectomy for benign thymic hyperplasia.      PHYSICAL EXAMINATION:   VITAL SIGNS:  Blood pressure 120/76; pulse 75; weight 127 kg; height 1.78 meters.   GENERAL:  Morbidly obese woman who presents to the examination alone, awake and alert and in no acute distress.  MUSCULOSKELETAL: The left leg does look somewhat larger than the right to visual inspection. There is no pitting edema.   NEUROLOGIC:   MENTAL STATUS:  Alert and oriented times four.   CRANIAL NERVES:  Visual fields are full to confrontation.  Extraocular movements are intact with no internuclear ophthalmoplegia.  Facial strength is normal.  Hearing is normal for conversational purposes.  Palate elevation and tongue protrusion are normal.   POWER:  Strength is normal (5/5) in the following muscles/groups:  Deltoids, biceps, triceps, wrist extensors, finger extensors, first dorsal interosseous, hip flexors, hamstrings and anterior tibialis.   REFLEXES:  Reflexes are symmetric and within normal limits at biceps, triceps, brachioradialis, knees and ankles.   MOTOR/CEREBELLAR:  There are no tremors, myoclonus or other abnormal movements.  Tone is grossly normal in the limbs.  There is no appendicular ataxia on finger-to-nose testing and rapid alternating movements are within normal limits in the extremities.  There is no pronator drift in the arms.   GAIT:  The patient is able to ambulate on a flat, level surface without difficulty.  She can walk on heels, toes and in tandem.      Assessment/plan:     1.  Relapsing-remitting multiple sclerosis  The  patient has been clinically stable over the last 6 months as regards any evidence of new MS relapse, and her examination is also stable and unchanged today.  We are going to check studies for routine laboratory monitoring including blood counts, liver function tests, TSH and vitamin D today.  I will advise her on supplementation as needed to maintain her vitamin D level in the goal range of 60-80.  At present, she is only taking a multiple vitamin supplement and likely this will not be sufficient to keep her vitamin D level in the desired range.      I am going to see her back in 6 months with MRI scans of the brain and cervical spine to assess the radiologic stability of her condition.  We discussed that if she were to have evidence of recurrent active inflammatory disease activity, there are multiple other medications that could be considered including oral medications such as fingolimod as well as IV therapies.  The downside of these treatments is they do carry low but no-nzero risks of serious side effects such as opportunistic infections, and if we are able to maintain disease stability on a very safe medication such as Plegridy that would likely be the best case scenario.     2.  Left leg swelling   Her left calf does look somewhat larger as compared to the right, although she does not have pitting edema as one might expect with a vascular lesion.  Follow up with primary care is advisable.      MD PRO Castorena MD             D: 2018   T: 2018   MT: AKA      Name:     BUBBA HERNÁNDEZ   MRN:      [id number]        Account:      [id number]   :      1985           Service Date: 2018      Document: [id number]

## 2018-06-11 DIAGNOSIS — G35 MULTIPLE SCLEROSIS (H): ICD-10-CM

## 2018-06-11 NOTE — TELEPHONE ENCOUNTER
[facility] ([facility] [place] drug [facility]); Patient was last seen in March and has follow up appointment in September with Dr. Peace; Refilled for 1 year per MS refill protocol.    Luz Shirley, MS RN Care Coordinator

## 2018-06-13 ENCOUNTER — TELEPHONE (OUTPATIENT)
Dept: NEUROLOGY | Facility: CLINIC | Age: 33
End: 2018-06-13

## 2018-06-13 NOTE — TELEPHONE ENCOUNTER
Called [facility] and spoke with Praveen. Provided her a v.o to dispense an additional Plegridy pen.

## 2018-06-13 NOTE — TELEPHONE ENCOUNTER
[facility] Call Center    Phone Message    May a detailed message be left on voicemail: yes    Reason for Call: Medication Question or concern regarding medication   Prescription Clarification  Name of Medication: peginterferon beta-1a (PLEGRIDY) 125 MCG/0.5ML SOPN prefilled pen  Prescribing Provider: Dr. Peace   [facility]:[phone]   What on the order needs clarification? One of the pens pt received was defective, so she can get a new one for no charge. They need an order sent over for just 1 pen for a 15 day supply.     Action Taken: Message routed to:  [facility] ([facility]): Neurology

## 2018-08-19 ENCOUNTER — HEALTH MAINTENANCE LETTER (OUTPATIENT)
Age: 33
End: 2018-08-19

## 2018-09-21 ENCOUNTER — MEDICAL CORRESPONDENCE (OUTPATIENT)
Dept: HEALTH INFORMATION MANAGEMENT | Facility: CLINIC | Age: 33
End: 2018-09-21

## 2018-09-25 ENCOUNTER — DOCUMENTATION ONLY (OUTPATIENT)
Dept: NEUROLOGY | Facility: CLINIC | Age: 33
End: 2018-09-25

## 2018-11-06 ENCOUNTER — HOSPITAL ENCOUNTER (OUTPATIENT)
Dept: MRI IMAGING | Facility: CLINIC | Age: 33
Discharge: HOME | End: 2018-11-06
Attending: PSYCHIATRY & NEUROLOGY | Admitting: PSYCHIATRY & NEUROLOGY
Payer: COMMERCIAL

## 2018-11-06 ENCOUNTER — HOSPITAL ENCOUNTER (OUTPATIENT)
Dept: MRI IMAGING | Facility: CLINIC | Age: 33
End: 2018-11-06
Attending: PSYCHIATRY & NEUROLOGY
Payer: COMMERCIAL

## 2018-11-06 DIAGNOSIS — G35 MULTIPLE SCLEROSIS (H): ICD-10-CM

## 2018-11-06 PROCEDURE — A9585 GADOBUTROL INJECTION: HCPCS | Performed by: PSYCHIATRY & NEUROLOGY

## 2018-11-06 PROCEDURE — 72156 MRI NECK SPINE W/O & W/DYE: CPT

## 2018-11-06 PROCEDURE — 70553 MRI BRAIN STEM W/O & W/DYE: CPT

## 2018-11-06 PROCEDURE — 25500064 ZZH RX 255 OP 636: Performed by: PSYCHIATRY & NEUROLOGY

## 2018-11-06 RX ORDER — GADOBUTROL 604.72 MG/ML
10 INJECTION INTRAVENOUS ONCE
Status: COMPLETED | OUTPATIENT
Start: 2018-11-06 | End: 2018-11-06

## 2018-11-06 RX ADMIN — GADOBUTROL 10 ML: 604.72 INJECTION INTRAVENOUS at 12:17

## 2018-11-08 ENCOUNTER — OFFICE VISIT (OUTPATIENT)
Dept: NEUROLOGY | Facility: CLINIC | Age: 33
End: 2018-11-08
Payer: COMMERCIAL

## 2018-11-08 VITALS — HEART RATE: 72 BPM | OXYGEN SATURATION: 99 % | SYSTOLIC BLOOD PRESSURE: 127 MMHG | DIASTOLIC BLOOD PRESSURE: 87 MMHG

## 2018-11-08 DIAGNOSIS — G35 MULTIPLE SCLEROSIS (H): Primary | ICD-10-CM

## 2018-11-08 DIAGNOSIS — G35 MULTIPLE SCLEROSIS (H): ICD-10-CM

## 2018-11-08 LAB
ALBUMIN SERPL-MCNC: 3.6 G/DL (ref 3.4–5)
ALP SERPL-CCNC: 85 U/L (ref 40–150)
ALT SERPL W P-5'-P-CCNC: 23 U/L (ref 0–50)
AST SERPL W P-5'-P-CCNC: 26 U/L (ref 0–45)
BASOPHILS # BLD AUTO: 0 10E9/L (ref 0–0.2)
BASOPHILS NFR BLD AUTO: 0.4 %
BILIRUB DIRECT SERPL-MCNC: <0.1 MG/DL (ref 0–0.2)
BILIRUB SERPL-MCNC: 0.2 MG/DL (ref 0.2–1.3)
DIFFERENTIAL METHOD BLD: ABNORMAL
EOSINOPHIL # BLD AUTO: 0 10E9/L (ref 0–0.7)
EOSINOPHIL NFR BLD AUTO: 0.9 %
ERYTHROCYTE [DISTWIDTH] IN BLOOD BY AUTOMATED COUNT: 15.8 % (ref 10–15)
HCT VFR BLD AUTO: 37.8 % (ref 35–47)
HGB BLD-MCNC: 11.9 G/DL (ref 11.7–15.7)
IMM GRANULOCYTES # BLD: 0 10E9/L (ref 0–0.4)
IMM GRANULOCYTES NFR BLD: 0 %
LYMPHOCYTES # BLD AUTO: 2.8 10E9/L (ref 0.8–5.3)
LYMPHOCYTES NFR BLD AUTO: 61.9 %
MCH RBC QN AUTO: 22.5 PG (ref 26.5–33)
MCHC RBC AUTO-ENTMCNC: 31.5 G/DL (ref 31.5–36.5)
MCV RBC AUTO: 71 FL (ref 78–100)
MONOCYTES # BLD AUTO: 0.4 10E9/L (ref 0–1.3)
MONOCYTES NFR BLD AUTO: 7.7 %
NEUTROPHILS # BLD AUTO: 1.3 10E9/L (ref 1.6–8.3)
NEUTROPHILS NFR BLD AUTO: 29.1 %
NRBC # BLD AUTO: 0 10*3/UL
NRBC BLD AUTO-RTO: 0 /100
PLATELET # BLD AUTO: 274 10E9/L (ref 150–450)
PROT SERPL-MCNC: 8.4 G/DL (ref 6.8–8.8)
RBC # BLD AUTO: 5.3 10E12/L (ref 3.8–5.2)
WBC # BLD AUTO: 4.5 10E9/L (ref 4–11)

## 2018-11-08 PROCEDURE — 86787 VARICELLA-ZOSTER ANTIBODY: CPT

## 2018-11-08 PROCEDURE — 82306 VITAMIN D 25 HYDROXY: CPT

## 2018-11-08 PROCEDURE — 82784 ASSAY IGA/IGD/IGG/IGM EACH: CPT

## 2018-11-08 PROCEDURE — 87340 HEPATITIS B SURFACE AG IA: CPT

## 2018-11-08 PROCEDURE — G0463 HOSPITAL OUTPT CLINIC VISIT: HCPCS | Mod: ZF

## 2018-11-08 PROCEDURE — 85025 COMPLETE CBC W/AUTO DIFF WBC: CPT

## 2018-11-08 PROCEDURE — 87389 HIV-1 AG W/HIV-1&-2 AB AG IA: CPT

## 2018-11-08 PROCEDURE — 86704 HEP B CORE ANTIBODY TOTAL: CPT

## 2018-11-08 PROCEDURE — 80076 HEPATIC FUNCTION PANEL: CPT

## 2018-11-08 PROCEDURE — 86706 HEP B SURFACE ANTIBODY: CPT

## 2018-11-08 ASSESSMENT — PAIN SCALES - GENERAL: PAINLEVEL: NO PAIN (0)

## 2018-11-08 NOTE — MR AVS SNAPSHOT
After Visit Summary   11/8/2018    Stefania Austin    MRN: [id number]           Patient Information     Date Of Birth          1985        Visit Information        Provider Department      11/8/2018 2:00 PM Tico Peace MD [facility] Multiple Sclerosis        Today's Diagnoses     Multiple sclerosis (H)    -  1      Care Instructions    1. Blood tests today    2. We provided you with information on three treatments for MS: Gilenya, Tysabri and Ocrevus    3. Return to clinic in 3 weeks          Follow-ups after your visit        Follow-up notes from your care team     Return in about 3 weeks (around 11/29/2018).      Your next 10 appointments already scheduled     Nov 08, 2018  3:15 PM CST   LAB with  LAB   [facility] ([facility])    [address] [place] [address] [phone]           Please do not eat 10-12 hours before your appointment if you are coming in fasting for labs on lipids, cholesterol, or glucose (sugar). This does not apply to pregnant women. Water, hot tea and black coffee (with nothing added) are okay. Do not drink other fluids, diet soda or chew gum.            Dec 06, 2018  1:30 PM CST   (Arrive by 1:15 PM)   Return Multiple Sclerosis with Tico Peace MD   [facility] ([facility])    [address] [place] [address] [phone]              Future tests that were ordered for you today     Open Future Orders        Priority Expected Expires Ordered    CBC with platelets differential Routine 11/8/2018 12/31/2018 11/8/2018    Hepatic panel Routine 11/8/2018 12/31/2018 11/8/2018    Vitamin D Deficiency Screening Routine 11/8/2018 12/31/2018 11/8/2018    CHAD Virus Ab Index Reflex Inhibition Routine 11/8/2018 12/31/2018 11/8/2018    HIV Antigen Antibody Combo Routine 11/8/2018 12/31/2018 11/8/2018    Hepatitis B surface antigen Routine  11/8/2018 12/31/2018 11/8/2018    Hepatitis B Surface Antibody Routine 11/8/2018 12/31/2018 11/8/2018    Hepatitis B core antibody Routine 11/8/2018 12/31/2018 11/8/2018    Varicella Zoster Virus Antibody IgG Routine 11/8/2018 12/31/2018 11/8/2018    IgG Routine 11/8/2018 12/31/2018 11/8/2018    IgM Routine 11/8/2018 12/31/2018 11/8/2018    IgA Routine 11/8/2018 12/31/2018 11/8/2018    M Tuberculosis by Quantiferon Routine 11/8/2018 12/31/2018 11/8/2018            Who to contact     If you have questions or need follow up information about today's clinic visit or your schedule please contact [facility] MULTIPLE SCLEROSIS directly at [phone].  Normal or non-critical lab and imaging results will be communicated to you by MyChart, letter or phone within 4 business days after the clinic has received the results. If you do not hear from us within 7 days, please contact the clinic through [employer] or phone. If you have a critical or abnormal lab result, we will notify you by phone as soon as possible.  Submit refill requests through [employer] or call your pharmacy and they will forward the refill request to us. Please allow 3 business days for your refill to be completed.          Additional Information About Your Visit        MyChart Information     [employer] gives you secure access to your electronic health record. If you see a primary care provider, you can also send messages to your care team and make appointments. If you have questions, please call your primary care clinic.  If you do not have a primary care provider, please call [phone] and they will assist you.        Care EveryWhere ID     This is your Care EveryWhere ID. This could be used by other organizations to access your [facility] medical records  [id number]        Your Vitals Were     Pulse Pulse Oximetry Breastfeeding?             72 99% No          Blood Pressure from Last 3 Encounters:   11/08/18 127/87   03/08/18 120/76   08/31/17 124/81    Weight  from Last 3 Encounters:   03/08/18 127 kg (280 lb)   01/17/17 118.8 kg (262 lb)   12/15/16 115.6 kg (254 lb 14.4 oz)               Primary Care Provider Office Phone # Fax #    Porsha Veliz [phone] [address] [facility] [place] [address]        Equal Access to Services     [facility] : Hadii aad ku hadasho Soomaali, waaxda luqadaha, qaybta kaalmada adeegyada, waxay idiin hayaan adeeg kharash la'aan ah. So [facility] [phone].    ATENCIÓN: Si habla hakan, tiene a hurtado disposición servicios gratuitos de asistencia lingüística. Llame al [phone].    We comply with applicable federal civil rights laws and [place] laws. We do not discriminate on the basis of race, color, national origin, age, disability, sex, sexual orientation, or gender identity.            Thank you!     Thank you for choosing [facility] MULTIPLE SCLEROSIS  for your care. Our goal is always to provide you with excellent care. Hearing back from our patients is one way we can continue to improve our services. Please take a few minutes to complete the written survey that you may receive in the mail after your visit with us. Thank you!             Your Updated Medication List - Protect others around you: Learn how to safely use, store and throw away your medicines at www.disposemymeds.org.          This list is accurate as of 11/8/18  3:08 PM.  Always use your most recent med list.                   Brand Name Dispense Instructions for use Diagnosis    ibuprofen 800 MG tablet    ADVIL/MOTRIN     Take 800 mg by mouth as needed        peginterferon beta-1a 125 MCG/0.5ML Sopn prefilled pen    PLEGRIDY    2 pen    Inject 0.5 mLs (125 mcg) Subcutaneous every 14 days    Multiple sclerosis (H)       WOMENS MULTIVITAMIN PLUS PO      Take 1 tablet by mouth daily

## 2018-11-08 NOTE — NURSING NOTE
Chief Complaint   Patient presents with     RECHECK     [facility] RETURN MS       /87 (BP Location: Left arm, Patient Position: Chair, Cuff Size: Adult Large)  Pulse 72  SpO2 99%  Breastfeeding? No    Page Lew, EMT

## 2018-11-08 NOTE — LETTER
11/8/2018      RE: Stefania Austin  [address]  [facility] [address]     Date of service: November 8, 2018     Referral source: Established patient     Chief complaint: Multiple sclerosis      History of the Present Illness: Ms. Stefania Autsin is a 33-year-old woman who returns to the [facility]clerosis Clinic today for follow-up after earlier MRI scans of the brain and cervical spine.      The patient's history is as per my previous notes.  She initially presented with symptoms of demyelinating disease in the summer of 2015 when she had an episode of facial weakness.  Several month later she had a sixth nerve palsy leading to MRI and CSF examinations that were consistent with a diagnosis of multiple sclerosis.  She was subsequently initiated on disease-modifying therapy with glatiramer acetate after that, but had radiologic disease activity on this medication and was then transitioned to the Plegridy formulation of interferon beta.      Today, the patient denies any new episodic changes in vision, balance, strength or sensation or other symptoms suggestive of MS relapse since she was last seen in this clinic.  Of some concern, she reports that she was off of Plegridy for a month due to some difficulty with a change in her insurance, although this has now been resolved.  Symptomatically, she endorses some difficulties with memory in that she has to write down details of conversations or a task that she is supposed to be doing in order to remember them.  This has been noticed by others at her work at a , although it does not appear that there has been significant concern about her work performance.      I reviewed images from MRI scans of the brain and cervical spine performed on 11/06/2018.  Brain MRI continues to show multiple discrete and confluent foci of periventricular T2 hyperintensity with additional discrete T2 hyperintense foci in juxtacortical and infratentorial locations.  Of  particular concern, there are new gadolinium enhancing lesions in the left parietal and right posterior frontal deep white matter consistent with active inflammatory demyelinating plaques.  MRI scan of the cervical spine shows no abnormal enhancement with gadolinium contrast and no significant change in comparison to earlier MRI of 08/28/2017.        PHYSICAL EXAMINATION:   VITAL SIGNS:  Blood pressure 127/87, pulse 72.   GENERAL:  Obese woman who presents to the evaluation alone, awake and alert and in no acute distress.      Assessment/plan:    1.  Relapsing-remitting multiple sclerosis  I discussed with the patient that her MRI is consistent with ongoing active inflammatory demyelination and that this is concerning.  In general, ongoing MRI disease activity suggests an increased risk of MS relapse and may also be associated with increased accumulation of long-term disability.      It is unfortunate that she missed a month of treatment with Plegridy due to an insurance issue.  I cannot entirely exclude the possibility that the brief interruption in therapy contributed to the disease activity seen on MRI today, but as platform injectable therapies are not the most potent treatments for multiple sclerosis, I would tend to err on the side of assuming that this is reflective of an inadequacy of this therapy as a treatment for her.      We discussed several alternative treatment options.  I briefly discussed dimethyl fumarate and teriflunomide with the patient.  There is, however, no good evidence that these oral therapies are significantly better than platform injectable treatments in terms of efficacy.      We discussed fingolimod.  This is an oral medication that is likely more potent than platform injectable drugs, although less potent than parenteral therapies.  This medication does carry low but non-zero risks of a number of serious side effects including potential cardiac arrhythmia, macular edema of the retina,  increased risk of basal cell carcinoma of the skin and most concerningly association with opportunistic infections of the brain including progressive multifocal leukoencephalopathy (PML) as well as other viral and fungal infections of the brain.  This medication does have the advantage of convenience compared to IV preparations and I do think that this would be a reasonable option for her.      We discussed natalizumab.  This is an IV infusion given every 4 weeks.  The primary concern with this medication is the associated risk of PML, with several hundred cases of PML having been associated with this drug, worldwide, significantly exceeding the risk of this infection associated with other MS therapies.  However, in CHAD virus negative patients, this risk appears to be a very remote.      We also discussed ocrelizumab.  This is an IV B-cell depleting monoclonal antibody that was approved for multiple sclerosis within the past 2 years.  In a clinical trial, this agent was clearly superior to the Rebif formulation of interferon beta in terms of reducing clinical relapses and reducing new lesions on MRI.  The early safety data on this drug has been relatively favorable, although concerns do remain about possibility of increased risk of opportunistic infection as well as possibly an increased risk of malignancy.  Nevertheless, I do think this is a good option in patients requiring parenteral therapy, particularly in the setting of CHAD virus seropositivity.      I told the patient that I think that we should proceed with screening laboratory studies for risk stratification as regards immune suppressive therapies for MS.  These will include varicella serology, QuantiFERON study for tuberculosis, baseline antibody levels, hepatitis B, HIV, and CHAD serologies.  We will also check blood counts, liver function tests and vitamin D level for monitoring of her current therapy with Plegridy.      I am going to have her come back in  3-4 weeks for further discussion of possible change in disease-modifying therapy. The patient should remain on Plegridy in the interim.      I spent 40 minutes with the patient in the office today, with greater than 50% of this time spent in counseling.      Tico Peace MD   of Neurology  [facility]    Cc:  Porsha Veliz MD (PCP)  Patient

## 2018-11-08 NOTE — PATIENT INSTRUCTIONS
1. Blood tests today    2. We provided you with information on three treatments for MS: Gilenya, Tysabri and Ocrevus    3. Return to clinic in 3 weeks

## 2018-11-09 LAB
DEPRECATED CALCIDIOL+CALCIFEROL SERPL-MC: 44 UG/L (ref 20–75)
HBV CORE AB SERPL QL IA: NONREACTIVE
HBV SURFACE AB SERPL IA-ACNC: 253.08 M[IU]/ML
HBV SURFACE AG SERPL QL IA: NONREACTIVE
HIV 1+2 AB+HIV1 P24 AG SERPL QL IA: NONREACTIVE
IGA SERPL-MCNC: 339 MG/DL (ref 70–380)
IGG SERPL-MCNC: 1560 MG/DL (ref 695–1620)
IGM SERPL-MCNC: 176 MG/DL (ref 60–265)
VZV IGG SER QL IA: <0.2 AI (ref 0–0.8)

## 2018-11-09 NOTE — PROGRESS NOTES
Date of service: November 8, 2018     Referral source: Established patient     Chief complaint: Multiple sclerosis      History of the Present Illness: Ms. Stefania Austin is a 33-year-old woman who returns to the [facility] Sclerosis Clinic today for follow-up after earlier MRI scans of the brain and cervical spine.      The patient's history is as per my previous notes.  She initially presented with symptoms of demyelinating disease in the summer of 2015 when she had an episode of facial weakness.  Several month later she had a sixth nerve palsy leading to MRI and CSF examinations that were consistent with a diagnosis of multiple sclerosis.  She was subsequently initiated on disease-modifying therapy with glatiramer acetate after that, but had radiologic disease activity on this medication and was then transitioned to the Plegridy formulation of interferon beta.      Today, the patient denies any new episodic changes in vision, balance, strength or sensation or other symptoms suggestive of MS relapse since she was last seen in this clinic.  Of some concern, she reports that she was off of Plegridy for a month due to some difficulty with a change in her insurance, although this has now been resolved.  Symptomatically, she endorses some difficulties with memory in that she has to write down details of conversations or a task that she is supposed to be doing in order to remember them.  This has been noticed by others at her work at a , although it does not appear that there has been significant concern about her work performance.      I reviewed images from MRI scans of the brain and cervical spine performed on 11/06/2018.  Brain MRI continues to show multiple discrete and confluent foci of periventricular T2 hyperintensity with additional discrete T2 hyperintense foci in juxtacortical and infratentorial locations.  Of particular concern, there are new gadolinium enhancing lesions in the left parietal and  right posterior frontal deep white matter consistent with active inflammatory demyelinating plaques.  MRI scan of the cervical spine shows no abnormal enhancement with gadolinium contrast and no significant change in comparison to earlier MRI of 08/28/2017.      PHYSICAL EXAMINATION:   VITAL SIGNS:  Blood pressure 127/87, pulse 72.   GENERAL:  Obese woman who presents to the evaluation alone, awake and alert and in no acute distress.      Assessment/plan:    1.  Relapsing-remitting multiple sclerosis  I discussed with the patient that her MRI is consistent with ongoing active inflammatory demyelination and that this is concerning.  In general, ongoing MRI disease activity suggests an increased risk of MS relapse and may also be associated with increased accumulation of long-term disability.      It is unfortunate that she missed a month of treatment with Plegridy due to an insurance issue.  I cannot entirely exclude the possibility that the brief interruption in therapy contributed to the disease activity seen on MRI today, but as platform injectable therapies are not the most potent treatments for multiple sclerosis, I would tend to err on the side of assuming that this is reflective of an inadequacy of this therapy as a treatment for her.      We discussed several alternative treatment options.  I briefly discussed dimethyl fumarate and teriflunomide with the patient.  There is, however, no good evidence that these oral therapies are significantly better than platform injectable treatments in terms of efficacy.      We discussed fingolimod.  This is an oral medication that is likely more potent than platform injectable drugs, although less potent than parenteral therapies.  This medication does carry low but non-zero risks of a number of serious side effects including potential cardiac arrhythmia, macular edema of the retina, increased risk of basal cell carcinoma of the skin and most concerningly association with  opportunistic infections of the brain including progressive multifocal leukoencephalopathy (PML) as well as other viral and fungal infections of the brain.  This medication does have the advantage of convenience compared to IV preparations and I do think that this would be a reasonable option for her.      We discussed natalizumab.  This is an IV infusion given every 4 weeks.  The primary concern with this medication is the associated risk of PML, with several hundred cases of PML having been associated with this drug, worldwide, significantly exceeding the risk of this infection associated with other MS therapies.  However, in CHAD virus negative patients, this risk appears to be a very remote.      We also discussed ocrelizumab.  This is an IV B-cell depleting monoclonal antibody that was approved for multiple sclerosis within the past 2 years.  In a clinical trial, this agent was clearly superior to the Rebif formulation of interferon beta in terms of reducing clinical relapses and reducing new lesions on MRI.  The early safety data on this drug has been relatively favorable, although concerns do remain about possibility of increased risk of opportunistic infection as well as possibly an increased risk of malignancy.  Nevertheless, I do think this is a good option in patients requiring parenteral therapy, particularly in the setting of CHAD virus seropositivity.      I told the patient that I think that we should proceed with screening laboratory studies for risk stratification as regards immune suppressive therapies for MS.  These will include varicella serology, QuantiFERON study for tuberculosis, baseline antibody levels, hepatitis B, HIV, and CHAD serologies.  We will also check blood counts, liver function tests and vitamin D level for monitoring of her current therapy with Plegridy.      I am going to have her come back in 3-4 weeks for further discussion of possible change in disease-modifying therapy. The  patient should remain on Plegridy in the interim.      I spent 40 minutes with the patient in the office today, with greater than 50% of this time spent in counseling.         PRO VALENCIA MD             D: 2018   T: 2018   MT: KATE      Name:     BUBBA HERNÁNDEZ   MRN:      [id number]        Account:      [id number]   :      1985           Service Date: 2018      Document: [id number]

## 2018-11-11 LAB
GAMMA INTERFERON BACKGROUND BLD IA-ACNC: 0.08 IU/ML
M TB IFN-G BLD-IMP: NEGATIVE
M TB IFN-G CD4+ BCKGRND COR BLD-ACNC: 6.16 IU/ML
MITOGEN IGNF BCKGRD COR BLD-ACNC: 0 IU/ML
MITOGEN IGNF BCKGRD COR BLD-ACNC: 0 IU/ML

## 2018-11-15 LAB — LAB SCANNED RESULT: ABNORMAL

## 2018-12-04 ENCOUNTER — HEALTH MAINTENANCE LETTER (OUTPATIENT)
Age: 33
End: 2018-12-04

## 2018-12-06 ENCOUNTER — MEDICAL CORRESPONDENCE (OUTPATIENT)
Dept: HEALTH INFORMATION MANAGEMENT | Facility: CLINIC | Age: 33
End: 2018-12-06

## 2018-12-06 ENCOUNTER — OFFICE VISIT (OUTPATIENT)
Dept: NEUROLOGY | Facility: CLINIC | Age: 33
End: 2018-12-06
Attending: PSYCHIATRY & NEUROLOGY
Payer: COMMERCIAL

## 2018-12-06 ENCOUNTER — TELEPHONE (OUTPATIENT)
Dept: NEUROLOGY | Facility: CLINIC | Age: 33
End: 2018-12-06

## 2018-12-06 VITALS
BODY MASS INDEX: 38.51 KG/M2 | SYSTOLIC BLOOD PRESSURE: 127 MMHG | DIASTOLIC BLOOD PRESSURE: 85 MMHG | WEIGHT: 269 LBS | HEIGHT: 70 IN | HEART RATE: 87 BPM

## 2018-12-06 DIAGNOSIS — G35 MULTIPLE SCLEROSIS (H): Primary | ICD-10-CM

## 2018-12-06 DIAGNOSIS — E55.9 VITAMIN D DEFICIENCY: ICD-10-CM

## 2018-12-06 DIAGNOSIS — E61.1 IRON DEFICIENCY: ICD-10-CM

## 2018-12-06 PROCEDURE — G0463 HOSPITAL OUTPT CLINIC VISIT: HCPCS | Mod: ZF

## 2018-12-06 ASSESSMENT — PAIN SCALES - GENERAL: PAINLEVEL: NO PAIN (0)

## 2018-12-06 NOTE — TELEPHONE ENCOUNTER
Patient was in for an office visit today with Dr. Peace, and the decision was made for her to start on Ocrevus infusions; Ocrevus start form filled out and signed by both and Dr. Peace and the patient; This has been faxed in to [facility]; Baseline labs are completed; She would like to infuse at [facility]; Ocrevus therapy plan orders placed and routed to Dr. Peace for review and signature; I will alert our PA team once the orders have been signed.    Luz Shirley, MS RN Care Coordinator

## 2018-12-06 NOTE — LETTER
January 15, 2019    Stefania Austin  [address]  [facility] [address]    Hi Stefania,    This letter is to remind you that you should schedule your Ocrevus infusions. Please call [facility] at [phone] to schedule those appointments. Please let us know if you have any questions.    Sincerely,    MD Luz Castorena, MS RN Care Coordinator  [facility]  [facility]  [address] [place] [address]  Phone [phone]  Fax [phone]

## 2018-12-06 NOTE — LETTER
12/6/2018      RE: Stefania Austin  [address]  [facility] [address]     Referral source: Established patient     Chief complaint: Multiple sclerosis     History of the Present Illness:  Ms. Stefania Austin is a 33-year-old woman who returns to the Multiple Sclerosis Clinic today for further discussion of possible change in disease-modifying therapy following laboratory studies.      The patient's history is as per my previous notes.  She initially presented with symptoms of demyelinating disease in 2015 when she had an episode of facial weakness.  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  She had further radiologic disease activity on disease-modifying therapy with glatiramer acetate and then was transitioned to the Plegridy formulation of interferon beta.  However, MRI last month continued to demonstrate accumulation of new enhancing lesions and I discussed with the patient that I would favor transitioning her to a more potent disease-modifying therapy.      She returns today for further discussion after laboratory studies for risk stratification.  QuantiFERON Gold study for tuberculosis and HIV serologies were negative.  Antibody levels were normal.  Hepatitis B serologies showed immunity to hepatitis B, presumably from vaccination, with no evidence of prior infection.      Other routine laboratory studies included normal liver function tests, blood counts showing borderline anemia with hemoglobin 11.9 grams per deciliter and low MCV at 71, suggestive of relative iron deficiency.  Vitamin D level was 44 mcg per liter.      Other pertinent results of note include positive CHAD serology with an elevated CHAD index of 4.03.  Varicella zoster serology was negative, indicating a lack of prior infection or immunity to this virus.      PHYSICAL EXAMINATION:   VITAL SIGNS:  Blood  pressure 127/85; pulse 87; weight 122 kg; height 1.78 meters.   GENERAL:  Obese woman who presents to the evaluation alone, awake and alert and in no acute distress.        Assessment/plan:    1.  Relapsing-remitting multiple sclerosis  As discussed with the patient at her last visit, I am concerned by finding of ongoing accumulation of demyelinating lesions on MRI and I would suggest switching to a more potent disease-modifying therapy.      Based on positive CHAD serology with high CHAD virus index, natalizumab would not be an appropriate choice for her at the present time.      Accordingly, we primarily discussed options including fingolimod and ocrelizumab.  One disadvantage of fingolimod in her case is that we would need to vaccinate her for varicella prior to proceeding with this therapy.  She would additionally need to pursue ophthalmologic examination at baseline and after 4 months on treatment screen for macular edema of the eye as well as undergoing first dose observation on EKG monitor for the first 6 hours after the first dose of medication.  I also discussed other low but non-zero risks of this medication, which also include increased risk of opportunistic infection including several cases of progressive multifocal leukoencephalopathy (PML) as well as other opportunistic infections of the brain.      We also discussed use of ocrelizumab.  This is an IV medication for multiple sclerosis.  It was approved in 2017.  Data from clinical trials indicate that this is a highly potent and efficacious therapy regarding prevention of new MRI lesions and clinical relapses.  This medicine functions by depleting B cells from the circulation and due to targeted immune suppressant effect, there is a potential risk of opportunistic infection associated with this drug, although when used as monotherapy for multiple sclerosis, there have been relatively few infectious complications of concern in practice in the approximately  1-1/2 years this medicine has been available.  I also shared with her that there is a theoretical concern that use of this medication could be associated with an increase in long-term risk of malignancy, although the presence of such a risk and magnitude, if any, are somewhat unclear.  Ongoing monitoring regarding potential for infectious as well as neoplastic complications of this medicine is ongoing, with one advantage to her being that there are quite a few patients who have been on this therapy for over a year and if the safety profile of this medication changes it is more likely that adverse effects would be seen in patients who would have been on this medication for longer than her.      I discussed with her that overall I would tend to lean toward favor ocrelizumab, although if she wanted the convenience of an oral medication, I think that fingolimod would also be quite a reasonable option.  At present, she is in agreement with plan to proceed with ocrelizumab and we did fill out the forms to initiate that medication today.  I would like her to return for a visit with our nurse practitioner, Carol Hernandez CNP, in about 2 months, at which time I expect we will have started the medication.  I will see her back in 6 months for a review and likely plan on MRI imaging again in approximately 1 year from now.     2. Iron deficiency  She is on a daily iron supplement. I suggested to the patient that she increase this to twice daily and try taking iron with a glass of orange juice (or 100 mg chewable vitamin C tablet) to try to improve iron absorption.    3. Vitamin D deficiency  I advised her to increase her vitamin D dose by 2000 units daily to try to bring her level up to the goal range of 60-80 mcg/L.     Tico Peace MD   of Neurology  [facility]    Cc:  Porsha Veliz MD (PCP)  Patient

## 2018-12-06 NOTE — MR AVS SNAPSHOT
After Visit Summary   12/6/2018    Stefania Austin    MRN: [id number]           Patient Information     Date Of Birth          1985        Visit Information        Provider Department      12/6/2018 1:30 PM Tico Peace MD [facility]        Care Instructions    1. We filled out the forms to initiate Ocrevus today    2. Remain on Plegridy until initial infusions are scheduled    3. Please increase your vitamin D dose by 2000 units (for example, if you are taking 2000 units daily, increase to 4000 units)    4. Suggest you try increasing your iron supplement to twice daily and take iron along with either 100 mg of vitamin C (chewable) or a glass or orange juice as this will help to improve iron absorption     5. Return to clinic to Carol Hernandez CNP in 2 months               Follow-ups after your visit        Follow-up notes from your care team     Return in about 2 months (around 2/6/2019).      Your next 10 appointments already scheduled     Feb 07, 2019 10:30 AM CST   (Arrive by 10:15 AM)   Return Multiple Sclerosis with SOURAV Magallanes CNP   [facility] ([facility])    [address] [place] [id number] [phone]              Who to contact     If you have questions or need follow up information about today's clinic visit or your schedule please contact [facility] MULTIPLE SCLEROSIS directly at [phone].  Normal or non-critical lab and imaging results will be communicated to you by MyChart, letter or phone within 4 business days after the clinic has received the results. If you do not hear from us within 7 days, please contact the clinic through MyChart or phone. If you have a critical or abnormal lab result, we will notify you by phone as soon as possible.  Submit refill requests through [employer] or call your pharmacy and they will forward the refill request to us. Please allow 3 business  "days for your refill to be completed.          Additional Information About Your Visit        MyChart Information     [employer] gives you secure access to your electronic health record. If you see a primary care provider, you can also send messages to your care team and make appointments. If you have questions, please call your primary care clinic.  If you do not have a primary care provider, please call [phone] and they will assist you.        Care EveryWhere ID     This is your Care EveryWhere ID. This could be used by other organizations to access your [place] medical records  [id number]        Your Vitals Were     Pulse Height BMI (Body Mass Index)             87 1.778 m (5' 10\") 38.6 kg/m2          Blood Pressure from Last 3 Encounters:   12/06/18 127/85   11/08/18 127/87   03/08/18 120/76    Weight from Last 3 Encounters:   12/06/18 122 kg (269 lb)   03/08/18 127 kg (280 lb)   01/17/17 118.8 kg (262 lb)              Today, you had the following     No orders found for display       Primary Care Provider Office Phone # Fax #    Porsha Veliz [phone]       [address] [place] [id number]        Equal Access to Services     ROSALES NUGENT AH: Hadii aad ku hadasho Soomaali, waaxda luqadaha, qaybta kaalmada adeegyada, waxay idiin hayisan pérez wylie lalola aldana. So [facility] [phone].    ATENCIÓN: Si habla español, tiene a hurtado disposición servicios gratuitos de asistencia lingüística. Llame al [phone].    We comply with applicable federal civil rights laws and [place] laws. We do not discriminate on the basis of race, color, national origin, age, disability, sex, sexual orientation, or gender identity.            Thank you!     Thank you for choosing [facility] MULTIPLE SCLEROSIS  for your care. Our goal is always to provide you with excellent care. Hearing back from our patients is one way we can continue to improve our services. Please take a few minutes to complete the written " survey that you may receive in the mail after your visit with us. Thank you!             Your Updated Medication List - Protect others around you: Learn how to safely use, store and throw away your medicines at www.disposemymeds.org.          This list is accurate as of 12/6/18  2:04 PM.  Always use your most recent med list.                   Brand Name Dispense Instructions for use Diagnosis    ibuprofen 800 MG tablet    ADVIL/MOTRIN     Take 800 mg by mouth as needed        peginterferon beta-1a 125 MCG/0.5ML Sopn prefilled pen    PLEGRIDY    2 pen    Inject 0.5 mLs (125 mcg) Subcutaneous every 14 days    Multiple sclerosis (H)       WOMENS MULTIVITAMIN PLUS PO      Take 1 tablet by mouth daily

## 2018-12-06 NOTE — TELEPHONE ENCOUNTER
[employer] information:    Case ID: [id number]  Patient ID: [id number]    Luz Shirley MS RN Care Coordinator

## 2018-12-06 NOTE — PATIENT INSTRUCTIONS
1. We filled out the forms to initiate Ocrevus today    2. Remain on Plegridy until initial infusions are scheduled    3. Please increase your vitamin D dose by 2000 units (for example, if you are taking 2000 units daily, increase to 4000 units)    4. Suggest you try increasing your iron supplement to twice daily and take iron along with either 100 mg of vitamin C (chewable) or a glass or orange juice as this will help to improve iron absorption     5. Return to clinic to Carol Hernandez CNP in 2 months

## 2018-12-07 NOTE — PROGRESS NOTES
Date of service: December 6, 2018    Referral source: Established patient     Chief complaint: Multiple sclerosis     History of the Present Illness:  Ms. Stefania Austin is a 33-year-old woman who returns to the Multiple Sclerosis Clinic today for further discussion of possible change in disease-modifying therapy following laboratory studies.      The patient's history is as per my previous notes.  She initially presented with symptoms of demyelinating disease in 2015 when she had an episode of facial weakness.  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  She had further radiologic disease activity on disease-modifying therapy with glatiramer acetate and then was transitioned to the Plegridy formulation of interferon beta.  However, MRI last month continued to demonstrate accumulation of new enhancing lesions and I discussed with the patient that I would favor transitioning her to a more potent disease-modifying therapy.      She returns today for further discussion after laboratory studies for risk stratification.  QuantiFERON Gold study for tuberculosis and HIV serologies were negative.  Antibody levels were normal.  Hepatitis B serologies showed immunity to hepatitis B, presumably from vaccination, with no evidence of prior infection.      Other routine laboratory studies included normal liver function tests, blood counts showing borderline anemia with hemoglobin 11.9 grams per deciliter and low MCV at 71, suggestive of relative iron deficiency.  Vitamin D level was 44 mcg per liter.      Other pertinent results of note include positive CHAD serology with an elevated CHAD index of 4.03.  Varicella zoster serology was negative, indicating a lack of prior infection or immunity to this virus.      PHYSICAL EXAMINATION:   VITAL SIGNS:  Blood pressure 127/85; pulse 87; weight 122 kg; height 1.78  meters.   GENERAL:  Obese woman who presents to the evaluation alone, awake and alert and in no acute distress.      Assessment/plan:    1.  Relapsing-remitting multiple sclerosis  As discussed with the patient at her last visit, I am concerned by finding of ongoing accumulation of demyelinating lesions on MRI and I would suggest switching to a more potent disease-modifying therapy.      Based on positive CHAD serology with high CHAD virus index, natalizumab would not be an appropriate choice for her at the present time.      Accordingly, we primarily discussed options including fingolimod and ocrelizumab.  One disadvantage of fingolimod in her case is that we would need to vaccinate her for varicella prior to proceeding with this therapy.  She would additionally need to pursue ophthalmologic examination at baseline and after 4 months on treatment screen for macular edema of the eye as well as undergoing first dose observation on EKG monitor for the first 6 hours after the first dose of medication.  I also discussed other low but non-zero risks of this medication, which also include increased risk of opportunistic infection including several cases of progressive multifocal leukoencephalopathy (PML) as well as other opportunistic infections of the brain.      We also discussed use of ocrelizumab.  This is an IV medication for multiple sclerosis.  It was approved in 2017.  Data from clinical trials indicate that this is a highly potent and efficacious therapy regarding prevention of new MRI lesions and clinical relapses.  This medicine functions by depleting B cells from the circulation and due to targeted immune suppressant effect, there is a potential risk of opportunistic infection associated with this drug, although when used as monotherapy for multiple sclerosis, there have been relatively few infectious complications of concern in practice in the approximately 1-1/2 years this medicine has been available.  I also  shared with her that there is a theoretical concern that use of this medication could be associated with an increase in long-term risk of malignancy, although the presence of such a risk and magnitude, if any, are somewhat unclear.  Ongoing monitoring regarding potential for infectious as well as neoplastic complications of this medicine is ongoing, with one advantage to her being that there are quite a few patients who have been on this therapy for over a year and if the safety profile of this medication changes it is more likely that adverse effects would be seen in patients who would have been on this medication for longer than her.      I discussed with her that overall I would tend to lean toward favor ocrelizumab, although if she wanted the convenience of an oral medication, I think that fingolimod would also be quite a reasonable option.  At present, she is in agreement with plan to proceed with ocrelizumab and we did fill out the forms to initiate that medication today.  I would like her to return for a visit with our nurse practitioner, Carol Hernandez CNP, in about 2 months, at which time I expect we will have started the medication.  I will see her back in 6 months for a review and likely plan on MRI imaging again in approximately 1 year from now.     2. Iron deficiency  She is on a daily iron supplement. I suggested to the patient that she increase this to twice daily and try taking iron with a glass of orange juice (or 100 mg chewable vitamin C tablet) to try to improve iron absorption.    3. Vitamin D deficiency  I advised her to increase her vitamin D dose by 2000 units daily to try to bring her level up to the goal range of 60-80 mcg/L.     I spent 25 minutes with the patient in the office today, with greater than 50% of this time spent in counseling.  I answered her questions.         PRO VALENCIA MD             D: 12/06/2018   T: 12/07/2018   MT: nh      Name:     BUBBA HERNÁNDEZ   MRN:       [id number]        Account:      [id number]   :      1985           Service Date: 2018      Document: [id number]

## 2018-12-10 ENCOUNTER — TELEPHONE (OUTPATIENT)
Dept: NEUROLOGY | Facility: CLINIC | Age: 33
End: 2018-12-10

## 2018-12-10 RX ORDER — ACETAMINOPHEN 325 MG/1
650 TABLET ORAL ONCE
Status: CANCELLED | OUTPATIENT
Start: 2018-12-10

## 2018-12-10 RX ORDER — METHYLPREDNISOLONE SODIUM SUCCINATE 125 MG/2ML
125 INJECTION, POWDER, LYOPHILIZED, FOR SOLUTION INTRAMUSCULAR; INTRAVENOUS ONCE
Status: CANCELLED | OUTPATIENT
Start: 2018-12-10

## 2018-12-10 RX ORDER — DIPHENHYDRAMINE HCL 25 MG
50 CAPSULE ORAL ONCE
Status: CANCELLED | OUTPATIENT
Start: 2018-12-10 | End: 2018-12-10

## 2018-12-10 NOTE — TELEPHONE ENCOUNTER
Ocrevus therapy plan orders and office visit note signed by Dr. Peace; I will send a PA request over to our infusion finance team.    Luz Shirley, MS RN Care Coordinator

## 2018-12-10 NOTE — TELEPHONE ENCOUNTER
Prior Authorization Infusion/Clinic Administered Request    Location: [facility]   Diagnosis and ICD: Relapsing Remitting Multiple Sclerosis, G35  Drug/Therapy: Ocrevus 300mg IV day 1 and day 15, then Ocrevus 600mg IV every 6 months thereafter    Previously Tried and Failed Therapies: Copaxone and Plegridy     Date of provider note with supporting information: 12/6/1    Urgency (When is the patient scheduled?): ASAP please    Would you like to include any research articles? n/a        If yes please call [phone] for further instructions about sending that information

## 2018-12-11 NOTE — TELEPHONE ENCOUNTER
Ocrevus orders signed by Dr. Peace, and PA request sent over to the infusion finance team; The Ocrevus infusions have been approved by the patient's insurance; We discussed at her appointment last week that I would send her a [email or web address] message once she could schedule; This has been done; I will follow up with her by phone if she has not read her [email or web address] message and/or scheduled by Thursday this week.    Luz Shirley MS RN Care Coordinator

## 2018-12-13 NOTE — TELEPHONE ENCOUNTER
Patient has not read her [employer] message regarding scheduling; I left [facility] for her advising of the approval and the phone number to call [facility] to schedule.    Luz Shirley, MS RN Care Coordinator

## 2018-12-31 NOTE — TELEPHONE ENCOUNTER
I called Stefania again, as she had not scheduled her infusions yet; I was able to reach her this time, and she apparently did not get my VMM from a couple weeks ago, and she had not read the [employer] message I sent her; I provided her the [facility] scheduling phone number; She has still been taking the Plegridy, which she last took on 12/28/18; I told her that if she is able to get the first dose of her Ocrevus before 1/11/19, then she does not need to do another Plegridy injection, however, if the it is after that date, she could go ahead and that that dose; She knows not to refill the Plegridy; She had no further questions at this time.    Luz Shirley, MS RN Care Coordinator

## 2019-01-15 NOTE — TELEPHONE ENCOUNTER
The patient still hasn't scheduled her Ocrevus infusions, despite previous messages and conversation with her; I am putting a letter in the mail to her today with a reminder and the phone number to call.    Luz Shirley, MS RN Care Coordinator

## 2019-01-24 NOTE — TELEPHONE ENCOUNTER
Patient is scheduled for her first Ocrevus infusion on 2/5/19.    Luz Shirley, MS RN Care Coordinator

## 2019-02-05 ENCOUNTER — INFUSION THERAPY VISIT (OUTPATIENT)
Dept: INFUSION THERAPY | Facility: CLINIC | Age: 34
End: 2019-02-05
Payer: MEDICAID

## 2019-02-05 VITALS
DIASTOLIC BLOOD PRESSURE: 57 MMHG | TEMPERATURE: 99.1 F | OXYGEN SATURATION: 99 % | WEIGHT: 269.9 LBS | HEART RATE: 85 BPM | BODY MASS INDEX: 38.73 KG/M2 | RESPIRATION RATE: 18 BRPM | SYSTOLIC BLOOD PRESSURE: 94 MMHG

## 2019-02-05 DIAGNOSIS — G35 MS (MULTIPLE SCLEROSIS) (H): Primary | ICD-10-CM

## 2019-02-05 PROCEDURE — 96413 CHEMO IV INFUSION 1 HR: CPT | Performed by: INTERNAL MEDICINE

## 2019-02-05 PROCEDURE — 99207 ZZC NO CHARGE LOS: CPT

## 2019-02-05 PROCEDURE — 96415 CHEMO IV INFUSION ADDL HR: CPT | Performed by: INTERNAL MEDICINE

## 2019-02-05 PROCEDURE — 96375 TX/PRO/DX INJ NEW DRUG ADDON: CPT | Performed by: INTERNAL MEDICINE

## 2019-02-05 RX ORDER — ACETAMINOPHEN 325 MG/1
650 TABLET ORAL ONCE
Status: COMPLETED | OUTPATIENT
Start: 2019-02-05 | End: 2019-02-05

## 2019-02-05 RX ORDER — METHYLPREDNISOLONE SODIUM SUCCINATE 125 MG/2ML
125 INJECTION, POWDER, LYOPHILIZED, FOR SOLUTION INTRAMUSCULAR; INTRAVENOUS ONCE
Status: CANCELLED | OUTPATIENT
Start: 2019-02-05

## 2019-02-05 RX ORDER — DIPHENHYDRAMINE HCL 25 MG
50 CAPSULE ORAL ONCE
Status: COMPLETED | OUTPATIENT
Start: 2019-02-05 | End: 2019-02-05

## 2019-02-05 RX ORDER — DIPHENHYDRAMINE HCL 25 MG
50 CAPSULE ORAL ONCE
Status: CANCELLED | OUTPATIENT
Start: 2019-02-05 | End: 2019-02-05

## 2019-02-05 RX ORDER — METHYLPREDNISOLONE SODIUM SUCCINATE 125 MG/2ML
125 INJECTION, POWDER, LYOPHILIZED, FOR SOLUTION INTRAMUSCULAR; INTRAVENOUS ONCE
Status: COMPLETED | OUTPATIENT
Start: 2019-02-05 | End: 2019-02-05

## 2019-02-05 RX ORDER — DIPHENHYDRAMINE HYDROCHLORIDE 50 MG/ML
50 INJECTION INTRAMUSCULAR; INTRAVENOUS
Status: COMPLETED | OUTPATIENT
Start: 2019-02-05 | End: 2019-02-05

## 2019-02-05 RX ORDER — ACETAMINOPHEN 325 MG/1
650 TABLET ORAL ONCE
Status: CANCELLED | OUTPATIENT
Start: 2019-02-05

## 2019-02-05 RX ADMIN — DIPHENHYDRAMINE HYDROCHLORIDE 50 MG: 50 INJECTION INTRAMUSCULAR; INTRAVENOUS at 12:10

## 2019-02-05 RX ADMIN — ACETAMINOPHEN 650 MG: 325 TABLET ORAL at 08:50

## 2019-02-05 RX ADMIN — Medication 50 MG: at 08:50

## 2019-02-05 RX ADMIN — METHYLPREDNISOLONE SODIUM SUCCINATE 125 MG: 125 INJECTION INTRAMUSCULAR; INTRAVENOUS at 09:12

## 2019-02-05 RX ADMIN — Medication 250 ML: at 09:12

## 2019-02-05 ASSESSMENT — PAIN SCALES - GENERAL: PAINLEVEL: NO PAIN (0)

## 2019-02-05 NOTE — PROGRESS NOTES
Infusion Nursing Note:  Stefania Austin presents today for First Ocrevus.    Patient seen by provider today: No   present during visit today: Not Applicable.    Note: When 238 ml of Ocrevus had infused and rate was at 180 ml/hr, patient reported itching and tingling on her head.  Ocrevus was stopped, benadryl was given.  After Benadryl, symptoms began to resolve almost immediately.  After 30 minutes at baseline, Ocrevus was restarted at 90 ml/hr for 30 minutes then ran at 120 ml/hr for a few minutes until it was complete without incident.     Intravenous Access:  Peripheral IV placed.    Treatment Conditions:  Biological Infusion Checklist:    ~~~ NOTE: If the patient answers yes to any of the questions below, hold the infusion and contact ordering provider or on-call provider.    1. Have you recently had an elevated temperature, fever, chills, productive cough, coughing for 3 weeks or longer or hemoptysis,  abnormal vital signs, night sweats,  chest pain or have you noticed a decrease in your appetite, unexplained weight loss or fatigue? No  2. Do you have any open wounds or new incisions? No  3. Do you have any recent or upcoming hospitalizations, surgeries or dental procedures? No  4. Do you currently have or recently have had any signs of illness or infection or are you on any antibiotics? No  5. Have you had any new, sudden or worsening abdominal pain? No  6. Have you or anyone in your household received a live vaccination in the past 4 weeks? Please note:  No live vaccines while on biologic/chemotherapy until 6 months after the last treatment.  Patient can receive the flu vaccine (shot only) and the pneumovax.  It is optimal for the patient to get these vaccines mid cycle, but they can be given at any time as long as it is not on the day of the infusion. No  7. Have you recently been diagnosed with any new nervous system diseases (ie. Multiple sclerosis, Guillain [place], seizures, neurological  changes) or cancer diagnosis? Are you on any form of radiation or chemotherapy? No  8. Are you pregnant or breast feeding or do you have plans of pregnancy in the future? No  9. Have you been having any signs of worsening depression or suicidal ideations?  (benlysta only) No  10. Have there been any other new onset medical symptoms? No        Post Infusion Assessment:  Patient tolerated Ocrevus well after the restart.    Patient observed for 60 minutes per protocol.    Discharge Plan:    Patient will return 2/19/19 for next appointment.   Patient discharged in stable condition accompanied by: self.  Departure Mode: Ambulatory.    Viktoriya Conklin RN

## 2019-02-05 NOTE — PROGRESS NOTES
"SPIRITUAL HEALTH SERVICES Progress Note  [facility]    Visited Stefania Austin to introduce  services and other support services available at [facility]. Offered support and affirmation of her journey.       Illness Narrative - Patient shared with me her journey  since 2015. The uncertainty,the tests, and trials for treatment of MS.   She states, \"It has been quite a journey.\"      Distress - She is hoping that this treatment helps her. She voices some frustration about past treatments.      Coping - Patient has 15 year old and a 9 year old with special needs.   She states that she also has a supportive [place] family and .       Meaning-Making - Not discussed today.       Plan - I gave the patient my contact information and let her know that she can request a  visit as needed.     Jimena Tamayo  Associate   Pager     "

## 2019-02-19 ENCOUNTER — INFUSION THERAPY VISIT (OUTPATIENT)
Dept: INFUSION THERAPY | Facility: CLINIC | Age: 34
End: 2019-02-19
Payer: MEDICAID

## 2019-02-19 VITALS
RESPIRATION RATE: 16 BRPM | TEMPERATURE: 98.5 F | HEART RATE: 65 BPM | SYSTOLIC BLOOD PRESSURE: 123 MMHG | OXYGEN SATURATION: 98 % | WEIGHT: 273.7 LBS | BODY MASS INDEX: 39.27 KG/M2 | DIASTOLIC BLOOD PRESSURE: 83 MMHG

## 2019-02-19 DIAGNOSIS — G35 MS (MULTIPLE SCLEROSIS) (H): Primary | ICD-10-CM

## 2019-02-19 PROCEDURE — 96413 CHEMO IV INFUSION 1 HR: CPT | Performed by: NURSE PRACTITIONER

## 2019-02-19 PROCEDURE — 96415 CHEMO IV INFUSION ADDL HR: CPT | Performed by: NURSE PRACTITIONER

## 2019-02-19 PROCEDURE — 99207 ZZC NO CHARGE LOS: CPT

## 2019-02-19 PROCEDURE — 96375 TX/PRO/DX INJ NEW DRUG ADDON: CPT | Performed by: NURSE PRACTITIONER

## 2019-02-19 RX ORDER — DIPHENHYDRAMINE HCL 25 MG
50 CAPSULE ORAL ONCE
Status: CANCELLED | OUTPATIENT
Start: 2019-02-19 | End: 2019-02-19

## 2019-02-19 RX ORDER — ACETAMINOPHEN 325 MG/1
650 TABLET ORAL ONCE
Status: COMPLETED | OUTPATIENT
Start: 2019-02-19 | End: 2019-02-19

## 2019-02-19 RX ORDER — DIPHENHYDRAMINE HCL 25 MG
50 CAPSULE ORAL ONCE
Status: COMPLETED | OUTPATIENT
Start: 2019-02-19 | End: 2019-02-19

## 2019-02-19 RX ORDER — METHYLPREDNISOLONE SODIUM SUCCINATE 125 MG/2ML
125 INJECTION, POWDER, LYOPHILIZED, FOR SOLUTION INTRAMUSCULAR; INTRAVENOUS ONCE
Status: CANCELLED | OUTPATIENT
Start: 2019-02-19

## 2019-02-19 RX ORDER — METHYLPREDNISOLONE SODIUM SUCCINATE 125 MG/2ML
125 INJECTION, POWDER, LYOPHILIZED, FOR SOLUTION INTRAMUSCULAR; INTRAVENOUS ONCE
Status: COMPLETED | OUTPATIENT
Start: 2019-02-19 | End: 2019-02-19

## 2019-02-19 RX ORDER — ACETAMINOPHEN 325 MG/1
650 TABLET ORAL ONCE
Status: CANCELLED | OUTPATIENT
Start: 2019-02-19

## 2019-02-19 RX ADMIN — ACETAMINOPHEN 650 MG: 325 TABLET ORAL at 10:19

## 2019-02-19 RX ADMIN — METHYLPREDNISOLONE SODIUM SUCCINATE 125 MG: 125 INJECTION INTRAMUSCULAR; INTRAVENOUS at 10:47

## 2019-02-19 RX ADMIN — Medication 250 ML: at 10:50

## 2019-02-19 RX ADMIN — Medication 50 MG: at 10:19

## 2019-02-19 ASSESSMENT — PAIN SCALES - GENERAL: PAINLEVEL: NO PAIN (0)

## 2019-02-19 NOTE — PROGRESS NOTES
Infusion Nursing Note:  Stefaniamandie Austin presents today for Ocrevus induction #2.    Patient seen by provider today: No   present during visit today: Not Applicable.    Note:N/A    Intravenous Access:  Peripheral IV placed.    Treatment Conditions:  Biological Infusion Checklist:    ~~~ NOTE: If the patient answers yes to any of the questions below, hold the infusion and contact ordering provider or on-call provider.    1. Have you recently had an elevated temperature, fever, chills, productive cough, coughing for 3 weeks or longer or hemoptysis,  abnormal vital signs, night sweats,  chest pain or have you noticed a decrease in your appetite, unexplained weight loss or fatigue? No  2. Do you have any open wounds or new incisions? No  3. Do you have any recent or upcoming hospitalizations, surgeries or dental procedures? No  4. Do you currently have or recently have had any signs of illness or infection or are you on any antibiotics? No  5. Have you had any new, sudden or worsening abdominal pain? No  6. Have you or anyone in your household received a live vaccination in the past 4 weeks? Please note:  No live vaccines while on biologic/chemotherapy until 6 months after the last treatment.  Patient can receive the flu vaccine (shot only) and the pneumovax.  It is optimal for the patient to get these vaccines mid cycle, but they can be given at any time as long as it is not on the day of the infusion. No  7. Have you recently been diagnosed with any new nervous system diseases (ie. Multiple sclerosis, Guillain [place], seizures, neurological changes) or cancer diagnosis? Are you on any form of radiation or chemotherapy? No  8. Are you pregnant or breast feeding or do you have plans of pregnancy in the future? No  9. Have you been having any signs of worsening depression or suicidal ideations?  (benlysta only) No  10. Have there been any other new onset medical symptoms? No    Hand-off given to [place]  KENJI Shah.   Cassidy Thomas RN-BSN, PHN, OCN  [facility]      Post Infusion Assessment:  Patient tolerated infusion without incident.  Patient observed for 60 minutes post per Ocrevus  protocol.  Blood return noted pre and post infusion.  No evidence of extravasations.  Access discontinued per protocol.  Biologic Infusion Post Education: Call the triage nurse at your clinic or seek medical attention if you have chills and/or temperature greater than or equal to 100.5, uncontrolled nausea/vomiting, diarrhea, constipation, dizziness, shortness of breath, chest pain, heart palpitations, weakness or any other new or concerning symptoms, questions or concerns.  You cannot have any live virus vaccines prior to or during treatment or up to 6 months post infusion.  If you have an upcoming surgery, medical procedure or dental procedure during treatment, this should be discussed with your ordering physician and your surgeon/dentist.  If you are having any concerning symptom, if you are unsure if you should get your next infusion or wish to speak to a provider before your next infusion, please call your care coordinator or triage nurse at your clinic to notify them so we can adequately serve you.    Discharge Plan:   Instructed to call provider for NP follow up visit.  Discharge instructions reviewed with: Patient.  Patient and/or family verbalized understanding of discharge instructions and all questions answered.  Patient discharged in stable condition accompanied by: self.  Departure Mode: Ambulatory.

## 2019-03-01 ENCOUNTER — OFFICE VISIT (OUTPATIENT)
Dept: NEUROLOGY | Facility: CLINIC | Age: 34
End: 2019-03-01
Attending: NURSE PRACTITIONER
Payer: COMMERCIAL

## 2019-03-01 VITALS
HEART RATE: 90 BPM | HEIGHT: 70 IN | WEIGHT: 277.4 LBS | BODY MASS INDEX: 39.71 KG/M2 | DIASTOLIC BLOOD PRESSURE: 84 MMHG | SYSTOLIC BLOOD PRESSURE: 124 MMHG

## 2019-03-01 DIAGNOSIS — E55.9 VITAMIN D DEFICIENCY: Primary | ICD-10-CM

## 2019-03-01 PROCEDURE — G0463 HOSPITAL OUTPT CLINIC VISIT: HCPCS | Mod: ZF

## 2019-03-01 RX ORDER — CHOLECALCIFEROL (VITAMIN D3) 50 MCG
2 TABLET ORAL DAILY
Qty: 30 TABLET | Refills: 3 | Status: SHIPPED | OUTPATIENT
Start: 2019-03-01

## 2019-03-01 ASSESSMENT — PAIN SCALES - GENERAL: PAINLEVEL: NO PAIN (0)

## 2019-03-01 ASSESSMENT — MIFFLIN-ST. JEOR: SCORE: 2043.53

## 2019-03-01 NOTE — LETTER
"3/1/2019       RE: Stefania Austin  [address]  [facility] [address]     Dear Colleague,    Thank you for referring your patient, Stefania Austin, to the [facility] MULTIPLE SCLEROSIS at [facility]. Please see a copy of my visit note below.    [facility] OUTPATIENT MULTIPLE SCLEROSIS CLINIC VISIT NOTE    REASON FOR VISIT: Britta is a 33 year old right handed female who presents to the clinic today for regularly scheduled follow up of Relapsing Remitting MS. She was most recently seen by Dr. Peace on 12/6/2018. She presents to the evaluation alone.     HISTORY OF PRESENT ILLNESS: Copied forward from Dr. Peace's previous note. She initially presented with symptoms of demyelinating disease in 2015 when she had an episode of facial weakness.  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  She had further radiologic disease activity on disease-modifying therapy with glatiramer acetate and then was transitioned to the Plegridy formulation of interferon beta. Her follow up MRI in 11/2018 showed accumulation of new enhancing lesions and Ocrelizumab was recommended.     INTERVAL HISTORY SINCE LAST VISIT: Britta was seen by Dr. Peace on 12/6/2018.  Treatment switch to ocrelizumab was recommended at that time due to accumulation of new enhancing lesions on brain MRI.  Patient had the first split doses of ocrelizumab on 2/5 and 2/19/2018.  She had a feeling of bilateral \"leg heaviness\" after the first infusion which lasted for the night and then went away.  After her second infusion she had a left sided headache and increase in \"urinary frequency\" the night of  infusion.  Headache went away the next day with Tylenol and ibuprofen.  Urinary frequency was back to her baseline the following day.    Overall doing well. No recent " "hospitalization or illness. Patient denies any new changes in vision, balance, strength or sensation suggestive of new relapse of multiple sclerosis since she was last seen here.  She denies any major residual symptoms from her prior relapses.  She denies any changes in her vision, speech, swallowing, hearing.  She reports intermittent tingling sensation in her bilateral hands.  No major issues with her balance and no recent falls.  Her mood is stable.  She rates her fatigue as mild to moderate.  She continues to work full time as a .  She sleeps 9 hours at night.  No major issues with spasticity.  She has urinary frequency and sometimes uses the bathroom up to 5 times at night.  No major issues with urinary incontinence, constipation or diarrhea    PAST MEDICAL/SURGICAL HISTORY:   1.  Relapsing remitting MS  2.  Iron deficiency anemia  3.  Cervical dysplasia  4.  Vitamin D deficiency    FAMILY HISTORY: No family history of MS.    SOCIAL HISTORY: She is .  She has 2 children ages 14 and 9.  She works full-time as a .  Denies smoking and recreational drug use and reports occasional alcohol use.    11/2018  MRI Brain and C spine:  IMPRESSION:  1. Findings of multiple sclerosis with evidence of interval and active  demyelination in the brain since 8/28/2017.  2. Stable chronic demyelinating plaque in the cervical cord at C5. No  evidence of active demyelination in the cervical spine.     Vit D: Has not been taking any supplementation recently.     PHYSICAL EXAMINATION:   VITAL SIGNS: /84 (BP Location: Left arm, Patient Position: Chair, Cuff Size: Adult Large)   Pulse 90   Ht 1.778 m (5' 10\")   Wt 125.8 kg (277 lb 6.4 oz)   BMI 39.80 kg/m      MENTAL STATUS: Alert,awake and oriented times four.   CRANIAL NERVES:  Visual fields are full to confrontation. The pupils are equal, round and react to light and there is no Mike Mendoza pupil. Funduscopic examination " demonstrates no optic pallor, papilledema or retinal hemorrhage/exudate. Extraocular movements are intact with no internuclear ophthalmoplegia. No nystagmus. Facial strength and sensation are normal. Hearing is normal. Palate elevation and tongue protrusion are normal.   POWER: Strength is normal (5/5) in the following muscles/groups: Deltoids, biceps, triceps, wrist extensors, finger abductors,  hip flexors, knee flexors, foot dorsiflexors.    SENSORY: Intact to light touch.   REFLEXES: Reflexes are normal, present and symmetric at biceps, triceps, brachioradialis, knees and ankles.   MOTOR/CEREBELLAR: There are no tremors, myoclonus or other abnormal movements. Tone is within normal limits in the limbs. There is no appendicular ataxia on finger-to-nose testing and rapid alternating movements are normal in the extremities. There is no pronator drift. Romberg negative.   GAIT: Gait is  narrow-based and steady and the patient is able to walk tandem without difficulty.    ASSESSMENT/ PLAN:   1. Relapsing Remitting MS, clinically stable on Ocrelizumab: Initial episode of right facial weakness, initially felt to represent peripheral right seventh nerve lesion, followed a few months later by a left sixth nerve palsy in 2015.  Subsequent MRI in December 2015 showed 3 periventricular enhancing lesions.  She also had abnormal spinal tap.  Previously treated with glatiramer acetate and Plegridy but had further radiology disease activity. Treatment was switched to Ocrelizumab and she received the first split doses on 2/5 and 2/19/2019.  Overall tolerated the infusions well except post infusion headache with the second infusion.    Today's clinical exam remains stable.  She will have the next ocrelizumab infusion in August 2019 and will follow up with Dr. Peace in 6 months.    2.  Vitamin D level: Patient reports that she has not been compliant with the vitamin D supplementation.  Her most recent level from November 2018  was 44.  I told her that with her MS diagnosis we prefer to see a goal rate of 60-90.  I sent a prescription for vitamin D 4000 international units daily to her outpatient pharmacy.    3.  Contraception: Patient reports that she is not currently sexually active.    4. Please contact us with questions or concerns.     Carol Hernandez, CNP  [facility] Neurology    I spent 55 minutes with this patient today, greater than 50% of which was spent in counseling and coordination of care.     (Chart documentation was completed in part with Dragon voice-recognition software. Even though reviewed, some grammatical, spelling, and word errors may remain.)

## 2019-03-01 NOTE — PROGRESS NOTES
"[facility] OUTPATIENT MULTIPLE SCLEROSIS CLINIC VISIT NOTE      REASON FOR VISIT: Britta is a 33 year old right handed female who presents to the clinic today for regularly scheduled follow up of Relapsing Remitting MS. She was most recently seen by Dr. Peace on 12/6/2018. She presents to the evaluation alone.     HISTORY OF PRESENT ILLNESS: Copied forward from Dr. Peace's previous note. She initially presented with symptoms of demyelinating disease in 2015 when she had an episode of facial weakness.  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  She had further radiologic disease activity on disease-modifying therapy with glatiramer acetate and then was transitioned to the Plegridy formulation of interferon beta. Her follow up MRI in 11/2018 showed accumulation of new enhancing lesions and Ocrelizumab was recommended.     INTERVAL HISTORY SINCE LAST VISIT: Britta was seen by Dr. Peace on 12/6/2018.  Treatment switch to ocrelizumab was recommended at that time due to accumulation of new enhancing lesions on brain MRI.  Patient had the first split doses of ocrelizumab on 2/5 and 2/19/2018.  She had a feeling of bilateral \"leg heaviness\" after the first infusion which lasted for the night and then went away.  After her second infusion she had a left sided headache and increase in \"urinary frequency\" the night of  infusion.  Headache went away the next day with Tylenol and ibuprofen.  Urinary frequency was back to her baseline the following day.    Overall doing well. No recent hospitalization or illness. Patient denies any new changes in vision, balance, strength or sensation suggestive of new relapse of multiple sclerosis since she was last seen here.  She denies any major residual symptoms from her prior relapses.  She denies any changes in her vision, speech, " "swallowing, hearing.  She reports intermittent tingling sensation in her bilateral hands.  No major issues with her balance and no recent falls.  Her mood is stable.  She rates her fatigue as mild to moderate.  She continues to work full time as a .  She sleeps 9 hours at night.  No major issues with spasticity.  She has urinary frequency and sometimes uses the bathroom up to 5 times at night.  No major issues with urinary incontinence, constipation or diarrhea    PAST MEDICAL/SURGICAL HISTORY:   1.  Relapsing remitting MS  2.  Iron deficiency anemia  3.  Cervical dysplasia  4.  Vitamin D deficiency    FAMILY HISTORY: No family history of MS.    SOCIAL HISTORY: She is .  She has 2 children ages 14 and 9.  She works full-time as a .  Denies smoking and recreational drug use and reports occasional alcohol use.    11/2018  MRI Brain and C spine:  IMPRESSION:  1. Findings of multiple sclerosis with evidence of interval and active  demyelination in the brain since 8/28/2017.  2. Stable chronic demyelinating plaque in the cervical cord at C5. No  evidence of active demyelination in the cervical spine.     Vit D: Has not been taking any supplementation recently.     PHYSICAL EXAMINATION:   VITAL SIGNS: /84 (BP Location: Left arm, Patient Position: Chair, Cuff Size: Adult Large)   Pulse 90   Ht 1.778 m (5' 10\")   Wt 125.8 kg (277 lb 6.4 oz)   BMI 39.80 kg/m     MENTAL STATUS: Alert,awake and oriented times four.   CRANIAL NERVES:  Visual fields are full to confrontation. The pupils are equal, round and react to light and there is no Mike Mendoza pupil. Funduscopic examination demonstrates no optic pallor, papilledema or retinal hemorrhage/exudate. Extraocular movements are intact with no internuclear ophthalmoplegia. No nystagmus. Facial strength and sensation are normal. Hearing is normal. Palate elevation and tongue protrusion are normal.   POWER: Strength is normal " (5/5) in the following muscles/groups: Deltoids, biceps, triceps, wrist extensors, finger abductors,  hip flexors, knee flexors, foot dorsiflexors.    SENSORY: Intact to light touch.   REFLEXES: Reflexes are normal, present and symmetric at biceps, triceps, brachioradialis, knees and ankles.   MOTOR/CEREBELLAR: There are no tremors, myoclonus or other abnormal movements. Tone is within normal limits in the limbs. There is no appendicular ataxia on finger-to-nose testing and rapid alternating movements are normal in the extremities. There is no pronator drift. Romberg negative.   GAIT: Gait is  narrow-based and steady and the patient is able to walk tandem without difficulty.      ASSESSMENT/ PLAN:   1. Relapsing Remitting MS, clinically stable on Ocrelizumab: Initial episode of right facial weakness, initially felt to represent peripheral right seventh nerve lesion, followed a few months later by a left sixth nerve palsy in 2015.  Subsequent MRI in December 2015 showed 3 periventricular enhancing lesions.  She also had abnormal spinal tap.  Previously treated with glatiramer acetate and Plegridy but had further radiology disease activity. Treatment was switched to Ocrelizumab and she received the first split doses on 2/5 and 2/19/2019.  Overall tolerated the infusions well except post infusion headache with the second infusion.    Today's clinical exam remains stable.  She will have the next ocrelizumab infusion in August 2019 and will follow up with Dr. Peace in 6 months.    2.  Vitamin D level: Patient reports that she has not been compliant with the vitamin D supplementation.  Her most recent level from November 2018 was 44.  I told her that with her MS diagnosis we prefer to see a goal rate of 60-90.  I sent a prescription for vitamin D 4000 international units daily to her outpatient pharmacy.    3.  Contraception: Patient reports that she is not currently sexually active.    4. Please contact us with  questions or concerns.     Carol Hernandez CNP  [facility] Neurology          I spent 55 minutes with this patient today, greater than 50% of which was spent in counseling and coordination of care.     (Chart documentation was completed in part with Dragon voice-recognition software. Even though reviewed, some grammatical, spelling, and word errors may remain.)

## 2019-03-01 NOTE — PATIENT INSTRUCTIONS
1. Vit D 4000 international unit(s) daily. A prescription was sent to your pharmacy.     2. F/u with Dr. Peace in 6 months.

## 2019-07-30 ENCOUNTER — OFFICE VISIT (OUTPATIENT)
Dept: ALLERGY | Facility: CLINIC | Age: 34
End: 2019-07-30
Payer: COMMERCIAL

## 2019-07-30 VITALS
DIASTOLIC BLOOD PRESSURE: 84 MMHG | HEART RATE: 76 BPM | WEIGHT: 283.2 LBS | BODY MASS INDEX: 40.63 KG/M2 | SYSTOLIC BLOOD PRESSURE: 132 MMHG | OXYGEN SATURATION: 100 %

## 2019-07-30 DIAGNOSIS — T78.1XXA PFAS (POLLEN-FOOD ALLERGY SYNDROME), INITIAL ENCOUNTER: Primary | ICD-10-CM

## 2019-07-30 DIAGNOSIS — H10.13 ALLERGIC CONJUNCTIVITIS, BILATERAL: ICD-10-CM

## 2019-07-30 DIAGNOSIS — J30.1 SEASONAL ALLERGIC RHINITIS DUE TO POLLEN: ICD-10-CM

## 2019-07-30 DIAGNOSIS — J30.89 ALLERGIC RHINITIS DUE TO MOLD: ICD-10-CM

## 2019-07-30 PROCEDURE — 99204 OFFICE O/P NEW MOD 45 MIN: CPT | Mod: 25 | Performed by: ALLERGY & IMMUNOLOGY

## 2019-07-30 PROCEDURE — 95004 PERQ TESTS W/ALRGNC XTRCS: CPT | Performed by: ALLERGY & IMMUNOLOGY

## 2019-07-30 RX ORDER — EPINEPHRINE 0.3 MG/.3ML
0.3 INJECTION SUBCUTANEOUS PRN
Qty: 0.6 ML | Refills: 3 | Status: SHIPPED | OUTPATIENT
Start: 2019-07-30

## 2019-07-30 RX ORDER — CETIRIZINE HYDROCHLORIDE 10 MG/1
10 TABLET ORAL DAILY
Qty: 30 TABLET | Refills: 11 | Status: SHIPPED | OUTPATIENT
Start: 2019-07-30 | End: 2021-04-08

## 2019-07-30 NOTE — LETTER
"    7/30/2019         RE: Stefania Austin  [address]  [facility] [place] [address]        Dear Colleague,    Thank you for referring your patient, Stefania Austin, to the [facility]. Please see a copy of my visit note below.    Stefania Austin was seen in the Allergy Clinic at [facility]. The following are my recommendations regarding her Allergic Rhinitis Due to Pollen, Allergic Rhinitis Due to Mold, Allergic Conjunctivitis and Food Pollen Syndrome    1. Recommend avoidance of tree nuts, with the exception of cashews, and raw apples, pears, plums, carrots, celery, bell peppers, and avocado  2. Use epinephrine auto-injector as directed for severe allergic reactions  3. Take 10mg of cetirizine as directed for mild allergic reactions  4. Anaphylaxis action plan reviewed and provided to the patient  5. Recommend second generation H1 antihistamine, such as loratadine, cetirizine, or fexofenadine, taken once daily as needed  6. Follow-up in 3 months      Stefania Ausitn is a 34 year old  female being seen today in consultation for allergies.  She states that there are many foods that she could eat when she was younger that she can no longer eat.  She tries to eat these foods she will develop throat itching, nausea, and just feels \"unwell.\"  She states that it is hard for her to describe how she feels and states that it feels as though her body is rejecting these foods.  The symptoms begin to occur within taking a binder to look the foods.  She will then eat other foods to keep herself from having this feeling.  Stefania states that her symptoms generally completely resolve within 30 to 60 minutes.  Foods that cause the symptoms include apples, pears, plums, carrots, celery, bell peppers, avocado, almonds, walnuts, and hazelnuts.  She is able to eat these fruits and vegetables if they have been cooked or processed and is able to tolerate things such as applesauce " and apple juice.    Stefania reports having seasonal allergy symptoms in the spring, summer, and fall months.  Her symptoms include itching eyes, sneezing, rhinorrhea, and nasal congestion.  She has never been tested or evaluated for allergies in the past.  She does take antihistamines such as Benadryl, loratadine or fexofenadine.  The medications do provide some relief however symptoms do cause sedation.  She has noted that the last couple of years she has had difficulty wearing her contacts for a few days during the allergy season.  She has been treated with eyedrops and once her symptoms improved she is able to resume wearing contacts.      Past Medical History:   Diagnosis Date     ASCUS with positive high risk HPV 2012     Bell's palsy      Cervical dysplasia 2012     Genital herpes      Hyperlipidemia LDL goal < 160      Hypertension goal BP (blood pressure) < 140/90      Major depression, single episode      Microcytic anemia      MS (multiple sclerosis) (H) 12/3/2015     Obesity 5/20/2014     Vitamin D deficiency      Family History   Problem Relation Age of Onset     Congenital Anomalies Son         Down's     Diabetes No family hx of      C.A.D. No family hx of      Cancer No family hx of      Past Surgical History:   Procedure Laterality Date     DAVINCI THYMECTOMY N/A 5/9/2016    Procedure: DAVINCI THYMECTOMY;  Surgeon: Leighton Estrella MD;  Location:  OR       ENVIRONMENTAL HISTORY: [place] The home is heated with a forced air. They does not have central air conditioning. The patient's bedroom is furnished with carpeting in bedroom.  Pets inside the house include None. There is no history of cockroach or mice infestation. There is/are 0 smokers in the house.  The house does not have a basement.     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 She has missed 0 days of school/work. She lives with her son and daughter.       REVIEW OF SYSTEMS:  General: negative for weight gain. negative for weight loss. negative for changes in sleep.   Eyes: negative for itching. negative for redness. negative for tearing/watering. negative for vision changes  Ears: negative for fullness. negative for hearing loss. negative for dizziness.   Nose: negative for snoring.negative for changes in smell. negative for drainage.   Throat: negative for hoarseness. negative for sore throat. negative for trouble swallowing.   Lungs: negative for cough. negative for shortness of breath.negative for wheezing. negative for sputum production.   Cardiovascular: negative for chest pain. negative for swelling of ankles. negative for fast or irregular heartbeat.   Gastrointestinal: negative for nausea. negative for heartburn. negative for acid reflux.   Musculoskeletal: negative for joint pain. negative for joint stiffness. negative for joint swelling.   Neurologic: negative for seizures. negative for fainting. negative for weakness.   Psychiatric: negative for changes in mood. negative for anxiety.   Endocrine: negative for cold intolerance. negative for heat intolerance. negative for tremors.   Hematologic: negative for easy bruising. negative for easy bleeding.  Integumentary: negative for rash. negative for scaling. negative for nail changes.       Current Outpatient Medications:      Multiple Vitamins-Minerals (WOMENS MULTIVITAMIN PLUS PO), Take 1 tablet by mouth daily, Disp: , Rfl:      vitamin D3 (CHOLECALCIFEROL) 2000 units tablet, Take 2 tablets by mouth daily, Disp: 30 tablet, Rfl: 3     ibuprofen (ADVIL/MOTRIN) 800 MG tablet, Take 800 mg by mouth as needed, Disp: , Rfl:      peginterferon beta-1a (PLEGRIDY) 125 MCG/0.5ML SOPN prefilled pen, Inject 0.5 mLs (125 mcg) Subcutaneous every 14 days (Patient not taking: Reported on 2/5/2019), Disp: 2 pen, Rfl: 11  Immunization History   Administered Date(s) Administered     TD (ADULT, 7+) 07/08/2009     No Known  Allergies      EXAM:   /84 (BP Location: Left arm, Patient Position: Sitting, Cuff Size: Adult Large)   Pulse 76   Wt 128.5 kg (283 lb 3.2 oz)   SpO2 100%   BMI 40.63 kg/m     GENERAL APPEARANCE: alert, cooperative and not in distress  SKIN: no rashes, no lesions  HEAD: atraumatic, normocephalic  EYES: lids and lashes normal, conjunctivae and sclerae clear, pupils equal, round, reactive to light, EOM full and intact  ENT: no scars or lesions, nasal exam showed no discharge, swelling or lesions noted, otoscopy showed external auditory canals clear, tympanic membranes normal, tongue midline and normal, soft palate, uvula, and tonsils normal  NECK: no asymmetry, masses, or scars, supple without significant adenopathy  LUNGS: unlabored respirations, no intercostal retractions or accessory muscle use, clear to auscultation without rales or wheezes  HEART: regular rate and rhythm without murmurs and normal S1 and S2  MUSCULOSKELETAL: no musculoskeletal defects are noted  NEURO: no focal deficits noted  PSYCH: does not appear depressed or anxious    WORKUP: Skin testing    ENVIRONMENTAL PERCUTANEOUS SKIN TESTING: ADULT  [facility] 7/30/2019   Consent Y   Ordering Physician  Dr. Olmedo   Interpreting Physician  Dr. Olmedo   Testing Technician Candelaria HOLLY RN   Location Back   Time start:  4:00 PM   Time End:  4:15 PM   Positive Control: Histatrol*ALK 1 mg/ml 5/10   Negative Control: 50% Glycerin 0   Cat Hair*ALK (10,000 BAU/ml) 0   AP Dog Hair/Dander (1:100 w/v) 0   Dust Mite p. 30,000 AU/ml 0   Dust Mite f. (30,000 AU/ml) 0   Mariano (W/F in millimeters) 5/15   Isaiah Grass (100,000 BAU/mL) 8/25   Red Cornelia (W/F in millimeters) 0   Maple/Carroll (W/F in millimeters) 7/20   Hackberry (W/F in millimeters) 0   [place] (W/F in millimeters) 5/15   [place] *ALK (W/F in millimeters) 0   American Elm (W/F in millimeters) 0   Pho[place] (W/F in millimeters) 5/20   Black [place] (W/F in millimeters) 0   Birch  Mix (W/F in millimeters) 11/30   [place] (W/F in millimeters) 0   Oak (W/F in millimeters) 10/26   Cocklebur (W/F in millimeters) 0   Davis (W/F in millimeters) 3/10   [place] (W/F in millimeters) 0   Careless (W/F in millimeters) 0   Nettle (W/F in millimeters) 5/12   English Plantain (W/F in millimeters) 0   Kochia (W/F in millimeters) 5/13   Lamb's Quarter (W/F in millimeters) 0   Marshelder (W/F in millimeters) 0   Ragweed Mix* ALK (W/F in millimeters) 15/35   Russian Thistle (W/F in millimeters) 3/10   Sagebrush/Mugwort (W/F in millimeters) 0   Sheep Sorrel (W/F in millimeters) 0   Feather Mix* ALK (W/F in millimeters) 0   Penicillium Mix (1:10 w/v) 0   Curvularia spicifera (1:10 w/v) 0   Epicoccum (1:10 w/v) 0   Aspergillus fumigatus (1:10 w/v): 0   Alternaria tenius (1:10 w/v) 8/30   H. Cladosporium (1:10 w/v) 0   Phoma herbarum (1:10 w/v) 0      Appropriate response to controls, positive to grass, trees, weeds, and molds    ASSESSMENT/PLAN:  Stefania Austin is a 34 year old female here for evaluation of food allergies. She reports having reactions to several fruits, vegetables, and nuts, typically when in their raw/uncooked forms. She has been able to tolerate these foods when cooked or processed. These symptoms are consistent with food-pollen syndrome. Stefania also reports seasonal rhinoconjunctivitis symptoms and skin testing was positive for sensitization to pollens and molds. She was counseled regarding the patterns of cross-reactivity between these foods and her pollen allergies. In most cases symptoms are limited to the oral cavity however a small percentage of patients may develop more severe symptoms as she has reported. Stefania was advised to avoid these foods in raw or uncooked forms and to consume these foods, with the exception of nuts, only in cooked preparations.    1. Recommend avoidance of tree nuts, with the exception of cashews, and raw apples, pears, plums, carrots, celery, bell  peppers, and avocado  2. Use epinephrine auto-injector as directed for severe allergic reactions  3. Take 10mg of cetirizine as directed for mild allergic reactions  4. Anaphylaxis action plan reviewed and provided to the patient  5. Recommend second generation H1 antihistamine, such as loratadine, cetirizine, or fexofenadine, taken once daily as needed  6. Follow-up in 3 months      Thank you for allowing me to participate in the care of Stefania Austin.      Leigha Olmedo MD  Allergy/Immunology  [place], [place]      Chart documentation done in part with Dragon Voice Recognition Software. Although reviewed after completion, some word and grammatical errors may remain.    Again, thank you for allowing me to participate in the care of your patient.        Sincerely,        Leigha Olmedo MD

## 2019-07-30 NOTE — PATIENT INSTRUCTIONS
If you have any questions regarding your allergies, asthma, or what we discussed during your visit today please call the allergy clinic or contact us via [employer].    [facility] Allergy RN Line: [phone]  [facility] Scheduling Line: [phone]  [facility] Scheduling Line: [phone]      ENVIRONMENTAL PERCUTANEOUS SKIN TESTING: ADULT  [facility] 7/30/2019   Consent Y   Ordering Physician  Dr. Olmedo   Interpreting Physician  Dr. Olmedo   Testing Technician Candelaria HOLLY RN   Location Back   Time start:  4:00 PM   Time End:  4:15 PM   Positive Control: Histatrol*ALK 1 mg/ml 5/10   Negative Control: 50% Glycerin 0   Cat Hair*ALK (10,000 BAU/ml) 0   AP Dog Hair/Dander (1:100 w/v) 0   Dust Mite p. 30,000 AU/ml 0   Dust Mite f. (30,000 AU/ml) 0   Mariano (W/F in millimeters) 5/15   Isaiah Grass (100,000 BAU/mL) 8/25   Red [place] (W/F in millimeters) 0   Maple/[place] (W/F in millimeters) 7/20   Hackberry (W/F in millimeters) 0   [place] (W/F in millimeters) 5/15   [place] *ALK (W/F in millimeters) 0   American Elm (W/F in millimeters) 0   [place] (W/F in millimeters) 5/20   Black [place] (W/F in millimeters) 0   Birch Mix (W/F in millimeters) 11/30   [place] (W/F in millimeters) 0   Oak (W/F in millimeters) 10/26   Cocklebur (W/F in millimeters) 0   [place] (W/F in millimeters) 3/10   [place] (W/F in millimeters) 0   Careless (W/F in millimeters) 0   Nettle (W/F in millimeters) 5/12   English Plantain (W/F in millimeters) 0   Kochia (W/F in millimeters) 5/13   Lamb's Quarter (W/F in millimeters) 0   Marshelder (W/F in millimeters) 0   Ragweed Mix* ALK (W/F in millimeters) 15/35   Russian Thistle (W/F in millimeters) 3/10   Sagebrush/Mugwort (W/F in millimeters) 0   Sheep Sorrel (W/F in millimeters) 0   Feather Mix* ALK (W/F in millimeters) 0   Penicillium Mix (1:10 w/v) 0   Curvularia spicifera (1:10 w/v) 0   Epicoccum (1:10 w/v) 0   Aspergillus fumigatus (1:10 w/v):  0   Alternaria tenius (1:10 w/v) 8/30   H. Cladosporium (1:10 w/v) 0   Phoma herbarum (1:10 w/v) 0

## 2019-07-30 NOTE — LETTER
ANAPHYLAXIS ALLERGY PLAN    Name: Stefania Austin      :  1985    Allergy to:  Apples, Pears, Plums, Cherries, Carrots, Celery, Avocado, Tree Nuts (except Cashew)    Weight: 283 lbs 3.2 oz           Asthma:  No      Do not depend on antihistamines or inhalers (bronchodilators) to treat a severe reaction; USE EPINEPHRINE      MEDICATIONS/DOSES  Epinephrine:  EpiPen/Adrenaclick  Epinephrine dose:  0.3 mg IM  Antihistamine:  Zyrtec (Cetirizine)  Antihistamine dose:  10mg  Other (e.g., inhaler-bronchodilator if wheezing):  None       ANAPHYLAXIS ALLERGY PLAN (Page 2)  Patient:  Stefania Austin  :  1985         Electronically signed on 2019 by:  Leigha Olmedo MD  Parent/Guardian Authorization Signature:  ___________________________ Date:    FORM PROVIDED COURTESY OF FOOD ALLERGY RESEARCH & EDUCATION (FARE) ([email or web address]) 2017

## 2019-07-30 NOTE — NURSING NOTE
Per provider verbal order, placed Adult Environmental Panel scratch test.  Consent was obtained prior to procedure.  Once panels were placed, patient was monitored for 15 minutes in clinic.  Provider read test after 15 minutes..  Pt tolerated procedure well.  All questions and concerns were addressed at office visit.     Candelaria Whittington RN

## 2019-07-30 NOTE — PROGRESS NOTES
"Stefania Austin was seen in the Allergy Clinic at [facility]. The following are my recommendations regarding her Allergic Rhinitis Due to Pollen, Allergic Rhinitis Due to Mold, Allergic Conjunctivitis and Food Pollen Syndrome    1. Recommend avoidance of tree nuts, with the exception of cashews, and raw apples, pears, plums, carrots, celery, bell peppers, and avocado  2. Use epinephrine auto-injector as directed for severe allergic reactions  3. Take 10mg of cetirizine as directed for mild allergic reactions  4. Anaphylaxis action plan reviewed and provided to the patient  5. Recommend second generation H1 antihistamine, such as loratadine, cetirizine, or fexofenadine, taken once daily as needed  6. Follow-up in 3 months      Stefania Austin is a 34 year old  female being seen today in consultation for allergies.  She states that there are many foods that she could eat when she was younger that she can no longer eat.  She tries to eat these foods she will develop throat itching, nausea, and just feels \"unwell.\"  She states that it is hard for her to describe how she feels and states that it feels as though her body is rejecting these foods.  The symptoms begin to occur within taking a binder to look the foods.  She will then eat other foods to keep herself from having this feeling.  Stefania states that her symptoms generally completely resolve within 30 to 60 minutes.  Foods that cause the symptoms include apples, pears, plums, carrots, celery, bell peppers, avocado, almonds, walnuts, and hazelnuts.  She is able to eat these fruits and vegetables if they have been cooked or processed and is able to tolerate things such as applesauce and apple juice.    Stefania reports having seasonal allergy symptoms in the spring, summer, and fall months.  Her symptoms include itching eyes, sneezing, rhinorrhea, and nasal congestion.  She has never been tested or evaluated for allergies in the " past.  She does take antihistamines such as Benadryl, loratadine or fexofenadine.  The medications do provide some relief however symptoms do cause sedation.  She has noted that the last couple of years she has had difficulty wearing her contacts for a few days during the allergy season.  She has been treated with eyedrops and once her symptoms improved she is able to resume wearing contacts.      Past Medical History:   Diagnosis Date     ASCUS with positive high risk HPV 2012     Bell's palsy      Cervical dysplasia 2012     Genital herpes      Hyperlipidemia LDL goal < 160      Hypertension goal BP (blood pressure) < 140/90      Major depression, single episode      Microcytic anemia      MS (multiple sclerosis) (H) 12/3/2015     Obesity 5/20/2014     Vitamin D deficiency      Family History   Problem Relation Age of Onset     Congenital Anomalies Son         Down's     Diabetes No family hx of      C.A.D. No family hx of      Cancer No family hx of      Past Surgical History:   Procedure Laterality Date     DAVINCI THYMECTOMY N/A 5/9/2016    Procedure: DAVINCI THYMECTOMY;  Surgeon: Leighton Estrella MD;  Location:  OR       ENVIRONMENTAL HISTORY: [place]. The home is heated with a forced air. They does not have central air conditioning. The patient's bedroom is furnished with carpeting in bedroom.  Pets inside the house include None. There is no history of cockroach or mice infestation. There is/are 0 smokers in the house.  The house does not have a basement.     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 She has missed 0 days of school/work. She lives with her son and daughter.      REVIEW OF SYSTEMS:  General: negative for weight gain. negative for weight loss. negative for changes in sleep.   Eyes: negative for itching. negative for redness. negative for tearing/watering. negative for vision changes  Ears: negative for  fullness. negative for hearing loss. negative for dizziness.   Nose: negative for snoring.negative for changes in smell. negative for drainage.   Throat: negative for hoarseness. negative for sore throat. negative for trouble swallowing.   Lungs: negative for cough. negative for shortness of breath.negative for wheezing. negative for sputum production.   Cardiovascular: negative for chest pain. negative for swelling of ankles. negative for fast or irregular heartbeat.   Gastrointestinal: negative for nausea. negative for heartburn. negative for acid reflux.   Musculoskeletal: negative for joint pain. negative for joint stiffness. negative for joint swelling.   Neurologic: negative for seizures. negative for fainting. negative for weakness.   Psychiatric: negative for changes in mood. negative for anxiety.   Endocrine: negative for cold intolerance. negative for heat intolerance. negative for tremors.   Hematologic: negative for easy bruising. negative for easy bleeding.  Integumentary: negative for rash. negative for scaling. negative for nail changes.       Current Outpatient Medications:      Multiple Vitamins-Minerals (WOMENS MULTIVITAMIN PLUS PO), Take 1 tablet by mouth daily, Disp: , Rfl:      vitamin D3 (CHOLECALCIFEROL) 2000 units tablet, Take 2 tablets by mouth daily, Disp: 30 tablet, Rfl: 3     ibuprofen (ADVIL/MOTRIN) 800 MG tablet, Take 800 mg by mouth as needed, Disp: , Rfl:      peginterferon beta-1a (PLEGRIDY) 125 MCG/0.5ML SOPN prefilled pen, Inject 0.5 mLs (125 mcg) Subcutaneous every 14 days (Patient not taking: Reported on 2/5/2019), Disp: 2 pen, Rfl: 11  Immunization History   Administered Date(s) Administered     TD (ADULT, 7+) 07/08/2009     No Known Allergies      EXAM:   /84 (BP Location: Left arm, Patient Position: Sitting, Cuff Size: Adult Large)   Pulse 76   Wt 128.5 kg (283 lb 3.2 oz)   SpO2 100%   BMI 40.63 kg/m    GENERAL APPEARANCE: alert, cooperative and not in  distress  SKIN: no rashes, no lesions  HEAD: atraumatic, normocephalic  EYES: lids and lashes normal, conjunctivae and sclerae clear, pupils equal, round, reactive to light, EOM full and intact  ENT: no scars or lesions, nasal exam showed no discharge, swelling or lesions noted, otoscopy showed external auditory canals clear, tympanic membranes normal, tongue midline and normal, soft palate, uvula, and tonsils normal  NECK: no asymmetry, masses, or scars, supple without significant adenopathy  LUNGS: unlabored respirations, no intercostal retractions or accessory muscle use, clear to auscultation without rales or wheezes  HEART: regular rate and rhythm without murmurs and normal S1 and S2  MUSCULOSKELETAL: no musculoskeletal defects are noted  NEURO: no focal deficits noted  PSYCH: does not appear depressed or anxious    WORKUP: Skin testing    ENVIRONMENTAL PERCUTANEOUS SKIN TESTING: ADULT  [place] [facility] 7/30/2019   Consent Y   Ordering Physician  Dr. Olmedo   Interpreting Physician  Dr. Olmedo   Testing Technician Candelaria HOLLY RN   Location Back   Time start:  4:00 PM   Time End:  4:15 PM   Positive Control: Histatrol*ALK 1 mg/ml 5/10   Negative Control: 50% Glycerin 0   Cat Hair*ALK (10,000 BAU/ml) 0   AP Dog Hair/Dander (1:100 w/v) 0   Dust Mite p. 30,000 AU/ml 0   Dust Mite f. (30,000 AU/ml) 0   Mariano (W/F in millimeters) 5/15   Isaiah Grass (100,000 BAU/mL) 8/25   Red [place] (W/F in millimeters) 0   Maple/[place] (W/F in millimeters) 7/20   [place]berry (W/F in millimeters) 0   [place] (W/F in millimeters) 5/15   [place] *ALK (W/F in millimeters) 0   American Elm (W/F in millimeters) 0   [place] (W/F in millimeters) 5/20   Black [place] (W/F in millimeters) 0   Birch Mix (W/F in millimeters) 11/30   [place] (W/F in millimeters) 0   Oak (W/F in millimeters) 10/26   [place] (W/F in millimeters) 0   [place] (W/F in millimeters) 3/10   [place] (W/F in millimeters) 0   Careless (W/F in millimeters) 0    Nettle (W/F in millimeters) 5/12   English Plantain (W/F in millimeters) 0   Kochia (W/F in millimeters) 5/13   Lamb's Quarter (W/F in millimeters) 0   Marshelder (W/F in millimeters) 0   Ragweed Mix* ALK (W/F in millimeters) 15/35   Russian Thistle (W/F in millimeters) 3/10   Sagebrush/Mugwort (W/F in millimeters) 0   Sheep Sorrel (W/F in millimeters) 0   Feather Mix* ALK (W/F in millimeters) 0   Penicillium Mix (1:10 w/v) 0   Curvularia spicifera (1:10 w/v) 0   Epicoccum (1:10 w/v) 0   Aspergillus fumigatus (1:10 w/v): 0   Alternaria tenius (1:10 w/v) 8/30   H. Cladosporium (1:10 w/v) 0   Phoma herbarum (1:10 w/v) 0      Appropriate response to controls, positive to grass, trees, weeds, and molds    ASSESSMENT/PLAN:  Stefania Austin is a 34 year old female here for evaluation of food allergies. She reports having reactions to several fruits, vegetables, and nuts, typically when in their raw/uncooked forms. She has been able to tolerate these foods when cooked or processed. These symptoms are consistent with food-pollen syndrome. Stefania also reports seasonal rhinoconjunctivitis symptoms and skin testing was positive for sensitization to pollens and molds. She was counseled regarding the patterns of cross-reactivity between these foods and her pollen allergies. In most cases symptoms are limited to the oral cavity however a small percentage of patients may develop more severe symptoms as she has reported. Stefania was advised to avoid these foods in raw or uncooked forms and to consume these foods, with the exception of nuts, only in cooked preparations.    1. Recommend avoidance of tree nuts, with the exception of cashews, and raw apples, pears, plums, carrots, celery, bell peppers, and avocado  2. Use epinephrine auto-injector as directed for severe allergic reactions  3. Take 10mg of cetirizine as directed for mild allergic reactions  4. Anaphylaxis action plan reviewed and provided to the patient  5.  Recommend second generation H1 antihistamine, such as loratadine, cetirizine, or fexofenadine, taken once daily as needed  6. Follow-up in 3 months      Thank you for allowing me to participate in the care of Stefania Austin.      Leigha Olmedo MD  Allergy/Immunology  [place], [place]      Chart documentation done in part with Dragon Voice Recognition Software. Although reviewed after completion, some word and grammatical errors may remain.

## 2019-09-05 ENCOUNTER — OFFICE VISIT (OUTPATIENT)
Dept: NEUROLOGY | Facility: CLINIC | Age: 34
End: 2019-09-05
Attending: NURSE PRACTITIONER
Payer: COMMERCIAL

## 2019-09-05 VITALS
WEIGHT: 283 LBS | DIASTOLIC BLOOD PRESSURE: 90 MMHG | RESPIRATION RATE: 17 BRPM | SYSTOLIC BLOOD PRESSURE: 138 MMHG | HEART RATE: 86 BPM | HEIGHT: 70 IN | BODY MASS INDEX: 40.52 KG/M2

## 2019-09-05 DIAGNOSIS — G35 MS (MULTIPLE SCLEROSIS) (H): Primary | ICD-10-CM

## 2019-09-05 DIAGNOSIS — G35 MS (MULTIPLE SCLEROSIS) (H): ICD-10-CM

## 2019-09-05 LAB
ALBUMIN SERPL-MCNC: 3.7 G/DL (ref 3.4–5)
ALP SERPL-CCNC: 93 U/L (ref 40–150)
ALT SERPL W P-5'-P-CCNC: 12 U/L (ref 0–50)
AST SERPL W P-5'-P-CCNC: 14 U/L (ref 0–45)
BASOPHILS # BLD AUTO: 0 10E9/L (ref 0–0.2)
BASOPHILS NFR BLD AUTO: 0.8 %
BILIRUB DIRECT SERPL-MCNC: <0.1 MG/DL (ref 0–0.2)
BILIRUB SERPL-MCNC: 0.2 MG/DL (ref 0.2–1.3)
DIFFERENTIAL METHOD BLD: ABNORMAL
EOSINOPHIL # BLD AUTO: 0.1 10E9/L (ref 0–0.7)
EOSINOPHIL NFR BLD AUTO: 1.4 %
ERYTHROCYTE [DISTWIDTH] IN BLOOD BY AUTOMATED COUNT: 15.8 % (ref 10–15)
HCT VFR BLD AUTO: 38.7 % (ref 35–47)
HGB BLD-MCNC: 12.1 G/DL (ref 11.7–15.7)
IMM GRANULOCYTES # BLD: 0 10E9/L (ref 0–0.4)
IMM GRANULOCYTES NFR BLD: 0.2 %
LYMPHOCYTES # BLD AUTO: 1.6 10E9/L (ref 0.8–5.3)
LYMPHOCYTES NFR BLD AUTO: 31.4 %
MCH RBC QN AUTO: 22.6 PG (ref 26.5–33)
MCHC RBC AUTO-ENTMCNC: 31.3 G/DL (ref 31.5–36.5)
MCV RBC AUTO: 72 FL (ref 78–100)
MONOCYTES # BLD AUTO: 0.4 10E9/L (ref 0–1.3)
MONOCYTES NFR BLD AUTO: 8.5 %
NEUTROPHILS # BLD AUTO: 2.9 10E9/L (ref 1.6–8.3)
NEUTROPHILS NFR BLD AUTO: 57.7 %
NRBC # BLD AUTO: 0 10*3/UL
NRBC BLD AUTO-RTO: 0 /100
PLATELET # BLD AUTO: 295 10E9/L (ref 150–450)
PROT SERPL-MCNC: 8.1 G/DL (ref 6.8–8.8)
RBC # BLD AUTO: 5.36 10E12/L (ref 3.8–5.2)
WBC # BLD AUTO: 5.1 10E9/L (ref 4–11)

## 2019-09-05 PROCEDURE — 82306 VITAMIN D 25 HYDROXY: CPT | Performed by: PSYCHIATRY & NEUROLOGY

## 2019-09-05 PROCEDURE — 80076 HEPATIC FUNCTION PANEL: CPT | Performed by: PSYCHIATRY & NEUROLOGY

## 2019-09-05 PROCEDURE — G0463 HOSPITAL OUTPT CLINIC VISIT: HCPCS

## 2019-09-05 PROCEDURE — 85025 COMPLETE CBC W/AUTO DIFF WBC: CPT | Performed by: PSYCHIATRY & NEUROLOGY

## 2019-09-05 PROCEDURE — 86355 B CELLS TOTAL COUNT: CPT | Performed by: PSYCHIATRY & NEUROLOGY

## 2019-09-05 PROCEDURE — 36415 COLL VENOUS BLD VENIPUNCTURE: CPT | Performed by: PSYCHIATRY & NEUROLOGY

## 2019-09-05 ASSESSMENT — MIFFLIN-ST. JEOR: SCORE: 2063.93

## 2019-09-05 ASSESSMENT — PAIN SCALES - GENERAL: PAINLEVEL: MILD PAIN (3)

## 2019-09-05 NOTE — NURSING NOTE
Chief Complaint   Patient presents with     RECHECK     MS    Medications reviewed and vital signs taken.   Horacio Bradley CMA

## 2019-09-05 NOTE — LETTER
"9/5/2019      RE: Stefania Austin  [address]  [facility] [address]     Referral source: Established patient     Chief complaint: Multiple sclerosis     History of the Present Illness: Ms. Stefania Austin is a 34-year-old woman who returns to the Multiple Sclerosis Clinic today for a scheduled follow-up visit.      The patient's history is as per my previous notes.  Initial onset of symptoms of demyelinating disease was in 2015 when she had an episode of facial weakness.  Several months after that she had diplopia and was found to have a sixth nerve palsy on examination.  MRI and CSF studies at that time were suggestive of demyelinating disease of the type seen in multiple sclerosis.  The patient was initially treated with glatiramer acetate, but had ongoing radiologic disease activity on that medication and was transitioned to the Plegridy formulation of interferon beta.  However, she had further radiologic progression on that medication as well and most recently began treatment with ocrelizumab in February 2019.        The patient indicates that with the first infusion, she developed an itchy sensation and her legs felt \"weird\" the evening after the infusion, but tolerated the second infusion without difficulty.  I explained to her that the initial infusion of ocrelizumab is often the most difficult to tolerate due to lysis of B cells provoked by the infusion.  As B cells are maintained at a very low level going forward, subsequent infusions are usually better tolerated.      At present, she is somewhat overdue for the first maintenance infusion and we will work on getting this scheduled.      She denies any new changes in vision, balance, strength or sensation suggestive of new relapse of multiple sclerosis since the time of her last visit here.      Past Medical History:  1.  Iron-deficiency anemia.   2.  Hyperlipidemia.   3.  Cervical dysplasia.   4.  Vitamin D deficiency.   5.  Status post " thymectomy for benign thymic hyperplasia.      PHYSICAL EXAMINATION:   VITAL SIGNS:  Blood pressure 136/90; pulse 86; weight 128.4 kg; height 1.78 meters.   GENERAL:  Morbidly obese woman who presents to the examination alone, awake and alert and in no acute distress.   NEUROLOGIC:   MENTAL STATUS:  Alert and oriented times four.   CRANIAL NERVES:  Visual fields are full to confrontation.  Extraocular movements are intact with no internuclear ophthalmoplegia.  Facial strength is normal.  Hearing is normal for conversational purposes.  Palate elevation and tongue protrusion are normal.   POWER:  Strength is normal (5/5) in the following muscles/groups bilaterally:  deltoids, biceps, triceps, wrist extensors, finger extensors, finger interossei, hip flexors, hamstrings and anterior tibialis.   REFLEXES:  Reflexes are symmetric and within normal limits at biceps, triceps, brachioradialis, knees and ankles.   MOTOR/CEREBELLAR:  There are no tremors, myoclonus or other abnormal movements.  Tone is grossly normal in the limbs.  There is no appendicular ataxia on finger-to-nose testing and rapid alternating movements are within normal limits in the extremities.  There is no pronator drift in the arms.   GAIT:  The patient is able to ambulate on a flat, level surface without difficulty.  She can walk on heels, toes and in tandem      Assessment/plan:    1. Relapsing-remitting multiple sclerosis  As above, the patient is overdue for maintenance ocrelizumab infusion and we will work on getting this scheduled as soon as possible.  We will go ahead and check routine laboratory studies today to include blood counts, liver function tests and a vitamin D level.  I will advise the patient on vitamin D supplementation as needed to maintain her level within the goal range of 60-80 mcg per liter.  We will also check her CD19 count today.      I will see her back in 6 months with MRI scans of the brain and cervical spine prior to that  visit to monitor the radiologic stability of her condition on the new medication.     Tico Peace MD   of Neurology  [facility]    Cc:  Patient

## 2019-09-05 NOTE — PATIENT INSTRUCTIONS
1. Please call the [facility] infusion center to schedule Ocrevus infusion at your earliest convenience    2. Blood tests today    3. Return to clinic in 6 months with MRI scans of the brain and cervical spine prior to the visit

## 2019-09-06 LAB
CD19 CELLS # BLD: 118 CELLS/UL (ref 107–698)
CD19 CELLS NFR BLD: 7 % (ref 6–27)
DEPRECATED CALCIDIOL+CALCIFEROL SERPL-MC: 39 UG/L (ref 20–75)

## 2019-09-12 NOTE — PROGRESS NOTES
"Date of service: September 5, 2019     Referral source: Established patient     Chief complaint: Multiple sclerosis     History of the Present Illness: Ms. Stefania Austin is a 34-year-old woman who returns to the Multiple Sclerosis Clinic today for a scheduled follow-up visit.      The patient's history is as per my previous notes.  Initial onset of symptoms of demyelinating disease was in 2015 when she had an episode of facial weakness.  Several months after that she had diplopia and was found to have a sixth nerve palsy on examination.  MRI and CSF studies at that time were suggestive of demyelinating disease of the type seen in multiple sclerosis.  The patient was initially treated with glatiramer acetate, but had ongoing radiologic disease activity on that medication and was transitioned to the Plegridy formulation of interferon beta.  However, she had further radiologic progression on that medication as well and most recently began treatment with ocrelizumab in February 2019.        The patient indicates that with the first infusion, she developed an itchy sensation and her legs felt \"weird\" the evening after the infusion, but tolerated the second infusion without difficulty.  I explained to her that the initial infusion of ocrelizumab is often the most difficult to tolerate due to lysis of B cells provoked by the infusion.  As B cells are maintained at a very low level going forward, subsequent infusions are usually better tolerated.      At present, she is somewhat overdue for the first maintenance infusion and we will work on getting this scheduled.      She denies any new changes in vision, balance, strength or sensation suggestive of new relapse of multiple sclerosis since the time of her last visit here.      Past Medical History:  1.  Iron-deficiency anemia.   2.  Hyperlipidemia.   3.  Cervical dysplasia.   4.  Vitamin D deficiency.   5.  Status post thymectomy for benign thymic hyperplasia.    "   PHYSICAL EXAMINATION:   VITAL SIGNS:  Blood pressure 136/90; pulse 86; weight 128.4 kg; height 1.78 meters.   GENERAL:  Morbidly obese woman who presents to the examination alone, awake and alert and in no acute distress.   NEUROLOGIC:   MENTAL STATUS:  Alert and oriented times four.   CRANIAL NERVES:  Visual fields are full to confrontation.  Extraocular movements are intact with no internuclear ophthalmoplegia.  Facial strength is normal.  Hearing is normal for conversational purposes.  Palate elevation and tongue protrusion are normal.   POWER:  Strength is normal (5/5) in the following muscles/groups bilaterally:  deltoids, biceps, triceps, wrist extensors, finger extensors, finger interossei, hip flexors, hamstrings and anterior tibialis.   REFLEXES:  Reflexes are symmetric and within normal limits at biceps, triceps, brachioradialis, knees and ankles.   MOTOR/CEREBELLAR:  There are no tremors, myoclonus or other abnormal movements.  Tone is grossly normal in the limbs.  There is no appendicular ataxia on finger-to-nose testing and rapid alternating movements are within normal limits in the extremities.  There is no pronator drift in the arms.   GAIT:  The patient is able to ambulate on a flat, level surface without difficulty.  She can walk on heels, toes and in tandem      Assessment/plan:    1. Relapsing-remitting multiple sclerosis  As above, the patient is overdue for maintenance ocrelizumab infusion and we will work on getting this scheduled as soon as possible.  We will go ahead and check routine laboratory studies today to include blood counts, liver function tests and a vitamin D level.  I will advise the patient on vitamin D supplementation as needed to maintain her level within the goal range of 60-80 mcg per liter.  We will also check her CD19 count today.      I will see her back in 6 months with MRI scans of the brain and cervical spine prior to that visit to monitor the radiologic stability of  her condition on the new medication.         PRO VALENCIA MD             D: 2019   T: 2019   MT: KATE      Name:     BUBBA HERNÁNDEZ   MRN:      [id number]        Account:      [id number]   :      1985           Service Date: 2019      Document: [id number]

## 2019-09-25 ENCOUNTER — INFUSION THERAPY VISIT (OUTPATIENT)
Dept: INFUSION THERAPY | Facility: CLINIC | Age: 34
End: 2019-09-25
Payer: COMMERCIAL

## 2019-09-25 VITALS
SYSTOLIC BLOOD PRESSURE: 125 MMHG | BODY MASS INDEX: 40.75 KG/M2 | WEIGHT: 284 LBS | OXYGEN SATURATION: 98 % | HEART RATE: 76 BPM | RESPIRATION RATE: 16 BRPM | TEMPERATURE: 98.6 F | DIASTOLIC BLOOD PRESSURE: 77 MMHG

## 2019-09-25 DIAGNOSIS — G35 MS (MULTIPLE SCLEROSIS) (H): Primary | ICD-10-CM

## 2019-09-25 PROCEDURE — 99207 ZZC NO CHARGE LOS: CPT

## 2019-09-25 PROCEDURE — 96413 CHEMO IV INFUSION 1 HR: CPT | Performed by: NURSE PRACTITIONER

## 2019-09-25 PROCEDURE — 96375 TX/PRO/DX INJ NEW DRUG ADDON: CPT | Performed by: NURSE PRACTITIONER

## 2019-09-25 PROCEDURE — 96415 CHEMO IV INFUSION ADDL HR: CPT | Performed by: NURSE PRACTITIONER

## 2019-09-25 RX ORDER — SODIUM CHLORIDE 9 MG/ML
INJECTION, SOLUTION INTRAVENOUS CONTINUOUS PRN
Status: DISCONTINUED | OUTPATIENT
Start: 2019-09-25 | End: 2019-09-25 | Stop reason: HOSPADM

## 2019-09-25 RX ORDER — METHYLPREDNISOLONE SODIUM SUCCINATE 125 MG/2ML
125 INJECTION, POWDER, LYOPHILIZED, FOR SOLUTION INTRAMUSCULAR; INTRAVENOUS ONCE
Status: COMPLETED | OUTPATIENT
Start: 2019-09-25 | End: 2019-09-25

## 2019-09-25 RX ORDER — METHYLPREDNISOLONE SODIUM SUCCINATE 125 MG/2ML
125 INJECTION, POWDER, LYOPHILIZED, FOR SOLUTION INTRAMUSCULAR; INTRAVENOUS
Status: DISCONTINUED | OUTPATIENT
Start: 2019-09-25 | End: 2019-09-25 | Stop reason: HOSPADM

## 2019-09-25 RX ORDER — DIPHENHYDRAMINE HYDROCHLORIDE 50 MG/ML
50 INJECTION INTRAMUSCULAR; INTRAVENOUS
Status: COMPLETED | OUTPATIENT
Start: 2019-09-25 | End: 2019-09-25

## 2019-09-25 RX ORDER — ACETAMINOPHEN 325 MG/1
650 TABLET ORAL ONCE
Status: CANCELLED | OUTPATIENT
Start: 2020-01-31

## 2019-09-25 RX ORDER — ALBUTEROL SULFATE 90 UG/1
2 AEROSOL, METERED RESPIRATORY (INHALATION)
Status: DISCONTINUED | OUTPATIENT
Start: 2019-09-25 | End: 2019-09-25 | Stop reason: HOSPADM

## 2019-09-25 RX ORDER — ACETAMINOPHEN 325 MG/1
650 TABLET ORAL ONCE
Status: COMPLETED | OUTPATIENT
Start: 2019-09-25 | End: 2019-09-25

## 2019-09-25 RX ORDER — DIPHENHYDRAMINE HCL 25 MG
50 CAPSULE ORAL ONCE
Status: COMPLETED | OUTPATIENT
Start: 2019-09-25 | End: 2019-09-25

## 2019-09-25 RX ORDER — DIPHENHYDRAMINE HCL 25 MG
50 CAPSULE ORAL ONCE
Status: CANCELLED | OUTPATIENT
Start: 2020-01-31

## 2019-09-25 RX ORDER — METHYLPREDNISOLONE SODIUM SUCCINATE 125 MG/2ML
125 INJECTION, POWDER, LYOPHILIZED, FOR SOLUTION INTRAMUSCULAR; INTRAVENOUS ONCE
Status: CANCELLED | OUTPATIENT
Start: 2020-01-31

## 2019-09-25 RX ORDER — ALBUTEROL SULFATE 0.83 MG/ML
2.5 SOLUTION RESPIRATORY (INHALATION)
Status: DISCONTINUED | OUTPATIENT
Start: 2019-09-25 | End: 2019-09-25 | Stop reason: HOSPADM

## 2019-09-25 RX ADMIN — ACETAMINOPHEN 650 MG: 325 TABLET ORAL at 09:20

## 2019-09-25 RX ADMIN — METHYLPREDNISOLONE SODIUM SUCCINATE 125 MG: 125 INJECTION INTRAMUSCULAR; INTRAVENOUS at 09:49

## 2019-09-25 RX ADMIN — DIPHENHYDRAMINE HYDROCHLORIDE 25 MG: 50 INJECTION INTRAMUSCULAR; INTRAVENOUS at 12:52

## 2019-09-25 RX ADMIN — Medication 50 MG: at 09:20

## 2019-09-25 RX ADMIN — Medication 250 ML: at 09:49

## 2019-09-25 ASSESSMENT — PAIN SCALES - GENERAL: PAINLEVEL: NO PAIN (0)

## 2019-09-25 NOTE — PROGRESS NOTES
"Patient put on call light.   Scalp \"tingling\" and itchy.    Infusion stopped.  Patient is sensitive to benedryl. 25mg given IVP.      Patient observed for 20 min   Patient reports resolution of itching.  Restarted at 100ml/hr until completion.      Ale Quintana RN          "

## 2019-09-25 NOTE — PROGRESS NOTES
Infusion Nursing Note:  Stefania S Norman presents today for Armasight.    Patient seen by provider today: No   present during visit today: Not Applicable.    Note: N/A.    Intravenous Access:  Peripheral IV placed.    Treatment Conditions:  Biological Infusion Checklist:  ~~~ NOTE: If the patient answers yes to any of the questions below, hold the infusion and contact ordering provider or on-call provider.    1. Have you recently had an elevated temperature, fever, chills, productive cough, coughing for 3 weeks or longer or hemoptysis, abnormal vital signs, night sweats,  chest pain or have you noticed a decrease in your appetite, unexplained weight loss or fatigue? No  2. Do you have any open wounds or new incisions? No  3. Do you have any recent or upcoming hospitalizations, surgeries or dental procedures? No  4. Do you currently have or recently have had any signs of illness or infection or are you on any antibiotics? No  5. Have you had any new, sudden or worsening abdominal pain? No  6. Have you or anyone in your household received a live vaccination in the past 4 weeks? Please note:  No live vaccines while on biologic/chemotherapy until 6 months after the last treatment.  Patient can receive the flu vaccine (shot only) and the pneumovax.  It is optimal for the patient to get these vaccines mid cycle, but they can be given at any time as long as it is not on the day of the infusion. No  7. Have you recently been diagnosed with any new nervous system diseases (ie. Multiple sclerosis, Guillain [place], seizures, neurological changes) or cancer diagnosis? No  8. Are you on any form of radiation or chemotherapy? No  9. Are you pregnant or breast feeding or do you have plans of pregnancy in the future? No  10. Have you been having any signs of worsening depression or suicidal ideations?  (benlysta only) No  11. Have there been any other new onset medical symptoms? No        Post Infusion Assessment:  Patient  tolerated infusion without incident.  Patient observed for 60 minutes post Ocrevus per protocol.  No evidence of extravasations.  Access discontinued per protocol.  Biologic Infusion Post Education: Call the triage nurse at your clinic or seek medical attention if you have chills and/or temperature greater than or equal to 100.5, uncontrolled nausea/vomiting, diarrhea, constipation, dizziness, shortness of breath, chest pain, heart palpitations, weakness or any other new or concerning symptoms, questions or concerns.  You cannot have any live virus vaccines prior to or during treatment or up to 6 months post infusion.  If you have an upcoming surgery, medical procedure or dental procedure during treatment, this should be discussed with your ordering physician and your surgeon/dentist.  If you are having any concerning symptom, if you are unsure if you should get your next infusion or wish to speak to a provider before your next infusion, please call your care coordinator or triage nurse at your clinic to notify them so we can adequately serve you.       Discharge Plan:   Discharge instructions reviewed with: Patient.  Patient and/or family verbalized understanding of discharge instructions and all questions answered.  Patient discharged in stable condition accompanied by: self.  Departure Mode: Ambulatory.    Candelaria Barnes, RN, RN

## 2019-10-05 ENCOUNTER — MYC MEDICAL ADVICE (OUTPATIENT)
Dept: NEUROLOGY | Facility: CLINIC | Age: 34
End: 2019-10-05

## 2020-03-02 ENCOUNTER — ANCILLARY PROCEDURE (OUTPATIENT)
Dept: MRI IMAGING | Facility: CLINIC | Age: 35
End: 2020-03-02
Attending: PSYCHIATRY & NEUROLOGY
Payer: MEDICAID

## 2020-03-02 ENCOUNTER — HEALTH MAINTENANCE LETTER (OUTPATIENT)
Age: 35
End: 2020-03-02

## 2020-03-02 DIAGNOSIS — G35 MS (MULTIPLE SCLEROSIS) (H): ICD-10-CM

## 2020-03-02 PROCEDURE — 72156 MRI NECK SPINE W/O & W/DYE: CPT | Performed by: RADIOLOGY

## 2020-03-02 PROCEDURE — 70553 MRI BRAIN STEM W/O & W/DYE: CPT | Performed by: RADIOLOGY

## 2020-03-02 PROCEDURE — A9585 GADOBUTROL INJECTION: HCPCS | Performed by: PSYCHIATRY & NEUROLOGY

## 2020-03-02 RX ORDER — GADOBUTROL 604.72 MG/ML
10 INJECTION INTRAVENOUS ONCE
Status: COMPLETED | OUTPATIENT
Start: 2020-03-02 | End: 2020-03-02

## 2020-03-02 RX ADMIN — GADOBUTROL 10 ML: 604.72 INJECTION INTRAVENOUS at 11:01

## 2020-03-05 ENCOUNTER — OFFICE VISIT (OUTPATIENT)
Dept: NEUROLOGY | Facility: CLINIC | Age: 35
End: 2020-03-05
Attending: PSYCHIATRY & NEUROLOGY
Payer: MEDICAID

## 2020-03-05 VITALS
SYSTOLIC BLOOD PRESSURE: 122 MMHG | HEIGHT: 70 IN | DIASTOLIC BLOOD PRESSURE: 84 MMHG | HEART RATE: 82 BPM | WEIGHT: 287.5 LBS | BODY MASS INDEX: 41.16 KG/M2

## 2020-03-05 DIAGNOSIS — L98.9 SKIN LESION: ICD-10-CM

## 2020-03-05 DIAGNOSIS — G35 MS (MULTIPLE SCLEROSIS) (H): Primary | ICD-10-CM

## 2020-03-05 PROCEDURE — G0463 HOSPITAL OUTPT CLINIC VISIT: HCPCS | Mod: ZF

## 2020-03-05 ASSESSMENT — PAIN SCALES - GENERAL: PAINLEVEL: NO PAIN (0)

## 2020-03-05 ASSESSMENT — MIFFLIN-ST. JEOR: SCORE: 2084.34

## 2020-03-05 NOTE — PATIENT INSTRUCTIONS
1. Blood tests on March 30 PRIOR to Ocrevus infusion    2. Return to [facility] in 6 months

## 2020-03-05 NOTE — LETTER
3/5/2020      RE: Stefania Austin  [address]  [facility] [address]     Referral source: Established patient     Chief complaint: Multiple sclerosis     History of the Present Illness: Ms. Stefania Austin is a 34-year-old woman who returns to the [facility] today for a scheduled follow-up visit after earlier MRI scans of the brain and cervical spine.     The patient's history is as per my previous notes.  Initial onset of symptoms of demyelinating disease was in 2015 when she had an episode of facial weakness.  Several months after that she had diplopia and was found to have a sixth nerve palsy on examination.  MRI and CSF studies at that time were suggestive of demyelinating disease of the type seen in multiple sclerosis.  The patient was initially treated with glatiramer acetate, but had ongoing radiologic disease activity on that medication and was transitioned to the Plegridy formulation of interferon beta.  However, she had further radiologic progression on that medication as well, and most recently began treatment with ocrelizumab in February 2019.       Today, she continues to deny any new episodic changes in vision, balance, strength or sensation suggestive of new relapse of multiple sclerosis since she was last seen. She had no difficulty with the last ocrelizumab infusion in September 2019.    I reviewed the images from MRI scans of the brain and cervical spine performed on 3/2/2020 with patient. MRI scan of the brain shows multiple periventricular, juxtacortical and infratentorial foci of T2 hyperintensity that are unchanged in comparison to previous MRI of 11/6/2018. Likewise, cervical MRI shows no change in comparison to previous image of 11/6/2018, with single focus of T2 hyperintensity at C5 better seen on earlier examinations. There is no abnormal enhancement with gadolinium contrast throughout the brain or cervical cord parenchyma.    Review of Systems: She is  concerned about change in appearance of a pigmented region on the back of the neck.    PHYSICAL EXAMINATION:  VITAL SIGNS: Blood pressure 122/84; pulse 82; weight 130.4 kg; height 1.78 mg.  GENERAL: Obese woman who presents to the evaluation awake and alert and in no acute distress.  DERMATOLOGIC: Visual inspection demonstrates a slightly raised area of increased pigmentation at the base of the neck.    Assessment/plan:    1. Multiple sclerosis  The patient remains clinically and radiologically stable on disease modifying therapy with ocrelizumab. She will proceed with the next ocrelizumab infusion as scheduled later this month.    I will check blood counts, liver function tests, and a vitamin D level prior to the infusion, and we will advise her on supplementation of vitamin D as needed to maintain her level within the goal range of 60-80 mcg/L.    I will also check a CD19 count prior to the infusion to make sure that her B cells are remaining suppressed on the standard every six month infusion schedule.    2. Skin lesion  I advised the patient to follow up with her primary care physician or dermatology regarding this skin change. This may just be a seborrheic keratosis, but I was not sure of this in viewing the lesion today.    I spent 15 minutes with the patient in the office today, with greater than 50% of this time spent in counseling and in reviewing the images with her.    Tico Peace MD   of Neurology  [facility]    Cc:  Porsha Veliz MD (PCP)  Patient

## 2020-03-25 ENCOUNTER — TELEPHONE (OUTPATIENT)
Dept: NEUROLOGY | Facility: CLINIC | Age: 35
End: 2020-03-25

## 2020-03-25 RX ORDER — HEPARIN SODIUM (PORCINE) LOCK FLUSH IV SOLN 100 UNIT/ML 100 UNIT/ML
5 SOLUTION INTRAVENOUS
Status: CANCELLED | OUTPATIENT
Start: 2020-03-25

## 2020-03-25 RX ORDER — METHYLPREDNISOLONE SODIUM SUCCINATE 125 MG/2ML
125 INJECTION, POWDER, LYOPHILIZED, FOR SOLUTION INTRAMUSCULAR; INTRAVENOUS ONCE
Status: CANCELLED | OUTPATIENT
Start: 2020-03-25

## 2020-03-25 RX ORDER — DIPHENHYDRAMINE HCL 25 MG
50 CAPSULE ORAL ONCE
Status: CANCELLED | OUTPATIENT
Start: 2020-03-25

## 2020-03-25 RX ORDER — HEPARIN SODIUM,PORCINE 10 UNIT/ML
5 VIAL (ML) INTRAVENOUS
Status: CANCELLED | OUTPATIENT
Start: 2020-03-25

## 2020-03-25 RX ORDER — ACETAMINOPHEN 325 MG/1
650 TABLET ORAL ONCE
Status: CANCELLED | OUTPATIENT
Start: 2020-03-25

## 2020-04-04 NOTE — PROGRESS NOTES
Date of service: March 5, 2020     Referral source: Established patient     Chief complaint: Multiple sclerosis     History of the Present Illness: Ms. Stefania Austin is a 34-year-old woman who returns to the [facility] today for a scheduled follow-up visit after earlier MRI scans of the brain and cervical spine.     The patient's history is as per my previous notes.  Initial onset of symptoms of demyelinating disease was in 2015 when she had an episode of facial weakness.  Several months after that she had diplopia and was found to have a sixth nerve palsy on examination.  MRI and CSF studies at that time were suggestive of demyelinating disease of the type seen in multiple sclerosis.  The patient was initially treated with glatiramer acetate, but had ongoing radiologic disease activity on that medication and was transitioned to the Plegridy formulation of interferon beta.  However, she had further radiologic progression on that medication as well, and most recently began treatment with ocrelizumab in February 2019.       Today, she continues to deny any new episodic changes in vision, balance, strength or sensation suggestive of new relapse of multiple sclerosis since she was last seen. She had no difficulty with the last ocrelizumab infusion in September 2019.    I reviewed the images from MRI scans of the brain and cervical spine performed on 3/2/2020 with patient. MRI scan of the brain shows multiple periventricular, juxtacortical and infratentorial foci of T2 hyperintensity that are unchanged in comparison to previous MRI of 11/6/2018. Likewise, cervical MRI shows no change in comparison to previous image of 11/6/2018, with single focus of T2 hyperintensity at C5 better seen on earlier examinations. There is no abnormal enhancement with gadolinium contrast throughout the brain or cervical cord parenchyma.    Review of Systems: She is concerned about change in appearance of a pigmented region on  the back of the neck.    PHYSICAL EXAMINATION:  VITAL SIGNS: Blood pressure 122/84; pulse 82; weight 130.4 kg; height 1.78 mg.  GENERAL: Obese woman who presents to the evaluation awake and alert and in no acute distress.  DERMATOLOGIC: Visual inspection demonstrates a slightly raised area of increased pigmentation at the base of the neck.    Assessment/plan:    1. Multiple sclerosis  The patient remains clinically and radiologically stable on disease modifying therapy with ocrelizumab. She will proceed with the next ocrelizumab infusion as scheduled later this month.    I will check blood counts, liver function tests, and a vitamin D level prior to the infusion, and we will advise her on supplementation of vitamin D as needed to maintain her level within the goal range of 60-80 mcg/L.    I will also check a CD19 count prior to the infusion to make sure that her B cells are remaining suppressed on the standard every six month infusion schedule.    2. Skin lesion  I advised the patient to follow up with her primary care physician or dermatology regarding this skin change. This may just be a seborrheic keratosis, but I was not sure of this in viewing the lesion today.    I spent 15 minutes with the patient in the office today, with greater than 50% of this time spent in counseling and in reviewing the images with her.

## 2020-04-06 ENCOUNTER — TELEPHONE (OUTPATIENT)
Dept: NEUROLOGY | Facility: CLINIC | Age: 35
End: 2020-04-06

## 2020-04-06 NOTE — TELEPHONE ENCOUNTER
I received the following message from Dr. Peace:    I note that the patient's appointment for Ocrevus on 3/30 was cancelled, I presume at her request.     She is over 6 months out from her last infusion, at present, and she either needs to have a CD19 count drawn or proceed with infusion at this time, as per her preference.     The risk of MS relapse from a delay in treatment substantially exceeds any risk of COVID-19 exposure.     Please contact the patient to advise her of above and determine what she would like to do.    I called the patient and she tells me that her infusion appointment was cancelled by the infusion center because they didn't have insurance approval; In looking at their referral notes, the infusion was denied and an appeal was submitted on 3/30/20; To my knowledge, we were not made aware of this; Will update Dr. Peace and check in with the infusion finance as to what is going on.    Luz Shirley, MS RN Care Coordinator

## 2020-06-12 DIAGNOSIS — G35 MS (MULTIPLE SCLEROSIS) (H): Primary | ICD-10-CM

## 2020-06-12 NOTE — PROGRESS NOTES
"The patient's insurer continues to deny approval for disease modifying therapy with ocrelizumab due to \"lack of yearly hepatitis B testing\" (despite the fact that the patient has documented immunity to this infection).     In an effort to expedite this medically necessary therapy, we will ask her to have this laboratory testing completed at her earliest convenience.  "

## 2020-06-15 NOTE — TELEPHONE ENCOUNTER
I called Stefania, as her insurer is requiring her to have hepatitis B lab work done; Lab orders placed by Dr. Peace; She will have her lab work done this week; Will update Dr. Peace; I told Stefania that we will be in touch with her once her lab results are back.    Luz Shirley MS RN Care Coordinator

## 2020-06-17 DIAGNOSIS — G35 MS (MULTIPLE SCLEROSIS) (H): ICD-10-CM

## 2020-06-17 LAB
ALBUMIN SERPL-MCNC: 3.5 G/DL (ref 3.4–5)
ALP SERPL-CCNC: 95 U/L (ref 40–150)
ALT SERPL W P-5'-P-CCNC: 14 U/L (ref 0–50)
AST SERPL W P-5'-P-CCNC: 13 U/L (ref 0–45)
BASOPHILS # BLD AUTO: 0 10E9/L (ref 0–0.2)
BASOPHILS NFR BLD AUTO: 0.6 %
BILIRUB DIRECT SERPL-MCNC: <0.1 MG/DL (ref 0–0.2)
BILIRUB SERPL-MCNC: 0.2 MG/DL (ref 0.2–1.3)
DIFFERENTIAL METHOD BLD: ABNORMAL
EOSINOPHIL # BLD AUTO: 0.1 10E9/L (ref 0–0.7)
EOSINOPHIL NFR BLD AUTO: 1.2 %
ERYTHROCYTE [DISTWIDTH] IN BLOOD BY AUTOMATED COUNT: 15.4 % (ref 10–15)
HCT VFR BLD AUTO: 37.6 % (ref 35–47)
HGB BLD-MCNC: 12 G/DL (ref 11.7–15.7)
LYMPHOCYTES # BLD AUTO: 1.7 10E9/L (ref 0.8–5.3)
LYMPHOCYTES NFR BLD AUTO: 33.5 %
MCH RBC QN AUTO: 22.3 PG (ref 26.5–33)
MCHC RBC AUTO-ENTMCNC: 31.9 G/DL (ref 31.5–36.5)
MCV RBC AUTO: 70 FL (ref 78–100)
MONOCYTES # BLD AUTO: 0.4 10E9/L (ref 0–1.3)
MONOCYTES NFR BLD AUTO: 7.8 %
NEUTROPHILS # BLD AUTO: 2.9 10E9/L (ref 1.6–8.3)
NEUTROPHILS NFR BLD AUTO: 56.9 %
PLATELET # BLD AUTO: 305 10E9/L (ref 150–450)
PROT SERPL-MCNC: 8.2 G/DL (ref 6.8–8.8)
RBC # BLD AUTO: 5.38 10E12/L (ref 3.8–5.2)
WBC # BLD AUTO: 5.1 10E9/L (ref 4–11)

## 2020-06-17 PROCEDURE — 82306 VITAMIN D 25 HYDROXY: CPT | Performed by: PSYCHIATRY & NEUROLOGY

## 2020-06-17 PROCEDURE — 80076 HEPATIC FUNCTION PANEL: CPT | Performed by: PSYCHIATRY & NEUROLOGY

## 2020-06-17 PROCEDURE — 86355 B CELLS TOTAL COUNT: CPT | Performed by: PSYCHIATRY & NEUROLOGY

## 2020-06-17 PROCEDURE — 85025 COMPLETE CBC W/AUTO DIFF WBC: CPT | Performed by: PSYCHIATRY & NEUROLOGY

## 2020-06-17 PROCEDURE — 86704 HEP B CORE ANTIBODY TOTAL: CPT | Performed by: PSYCHIATRY & NEUROLOGY

## 2020-06-17 PROCEDURE — 87340 HEPATITIS B SURFACE AG IA: CPT | Performed by: PSYCHIATRY & NEUROLOGY

## 2020-06-17 PROCEDURE — 36415 COLL VENOUS BLD VENIPUNCTURE: CPT | Performed by: PSYCHIATRY & NEUROLOGY

## 2020-06-17 PROCEDURE — 86706 HEP B SURFACE ANTIBODY: CPT | Performed by: PSYCHIATRY & NEUROLOGY

## 2020-06-18 LAB
CD19 CELLS # BLD: 238 CELLS/UL (ref 107–698)
CD19 CELLS NFR BLD: 11 % (ref 6–27)
DEPRECATED CALCIDIOL+CALCIFEROL SERPL-MC: 36 UG/L (ref 20–75)
HBV CORE AB SERPL QL IA: NONREACTIVE
HBV SURFACE AB SERPL IA-ACNC: 244.1 M[IU]/ML
HBV SURFACE AG SERPL QL IA: NONREACTIVE

## 2020-07-15 ENCOUNTER — INFUSION THERAPY VISIT (OUTPATIENT)
Dept: INFUSION THERAPY | Facility: CLINIC | Age: 35
End: 2020-07-15
Payer: COMMERCIAL

## 2020-07-15 VITALS
BODY MASS INDEX: 41.83 KG/M2 | SYSTOLIC BLOOD PRESSURE: 126 MMHG | DIASTOLIC BLOOD PRESSURE: 86 MMHG | WEIGHT: 291.5 LBS | HEART RATE: 82 BPM | TEMPERATURE: 98.2 F | OXYGEN SATURATION: 100 %

## 2020-07-15 DIAGNOSIS — G35 MS (MULTIPLE SCLEROSIS) (H): Primary | ICD-10-CM

## 2020-07-15 PROCEDURE — 99207 ZZC NO CHARGE NURSE ONLY: CPT

## 2020-07-15 PROCEDURE — 96415 CHEMO IV INFUSION ADDL HR: CPT | Performed by: NURSE PRACTITIONER

## 2020-07-15 PROCEDURE — 96375 TX/PRO/DX INJ NEW DRUG ADDON: CPT | Performed by: NURSE PRACTITIONER

## 2020-07-15 PROCEDURE — 96413 CHEMO IV INFUSION 1 HR: CPT | Performed by: NURSE PRACTITIONER

## 2020-07-15 RX ORDER — METHYLPREDNISOLONE SODIUM SUCCINATE 125 MG/2ML
125 INJECTION, POWDER, LYOPHILIZED, FOR SOLUTION INTRAMUSCULAR; INTRAVENOUS ONCE
Status: COMPLETED | OUTPATIENT
Start: 2020-07-15 | End: 2020-07-15

## 2020-07-15 RX ORDER — DIPHENHYDRAMINE HCL 25 MG
50 CAPSULE ORAL ONCE
Status: COMPLETED | OUTPATIENT
Start: 2020-07-15 | End: 2020-07-15

## 2020-07-15 RX ORDER — HEPARIN SODIUM,PORCINE 10 UNIT/ML
5 VIAL (ML) INTRAVENOUS
Status: CANCELLED | OUTPATIENT
Start: 2020-07-15

## 2020-07-15 RX ORDER — METHYLPREDNISOLONE SODIUM SUCCINATE 125 MG/2ML
125 INJECTION, POWDER, LYOPHILIZED, FOR SOLUTION INTRAMUSCULAR; INTRAVENOUS ONCE
Status: CANCELLED | OUTPATIENT
Start: 2020-07-15

## 2020-07-15 RX ORDER — DIPHENHYDRAMINE HCL 25 MG
50 CAPSULE ORAL ONCE
Status: CANCELLED | OUTPATIENT
Start: 2020-07-15

## 2020-07-15 RX ORDER — HEPARIN SODIUM (PORCINE) LOCK FLUSH IV SOLN 100 UNIT/ML 100 UNIT/ML
5 SOLUTION INTRAVENOUS
Status: CANCELLED | OUTPATIENT
Start: 2020-07-15

## 2020-07-15 RX ORDER — ACETAMINOPHEN 325 MG/1
650 TABLET ORAL ONCE
Status: COMPLETED | OUTPATIENT
Start: 2020-07-15 | End: 2020-07-15

## 2020-07-15 RX ORDER — ACETAMINOPHEN 325 MG/1
650 TABLET ORAL ONCE
Status: CANCELLED | OUTPATIENT
Start: 2020-07-15

## 2020-07-15 RX ADMIN — Medication 50 MG: at 08:32

## 2020-07-15 RX ADMIN — METHYLPREDNISOLONE SODIUM SUCCINATE 125 MG: 125 INJECTION INTRAMUSCULAR; INTRAVENOUS at 08:56

## 2020-07-15 RX ADMIN — ACETAMINOPHEN 650 MG: 325 TABLET ORAL at 08:32

## 2020-07-15 RX ADMIN — Medication 250 ML: at 08:52

## 2020-07-15 ASSESSMENT — PAIN SCALES - GENERAL: PAINLEVEL: NO PAIN (0)

## 2020-07-15 NOTE — PROGRESS NOTES
Infusion Nursing Note:  Stefaniamandie Austin presents today for Ocrevus #2.    Patient seen by provider today: No   present during visit today: Not Applicable.    Note: Pt denies new medical concerns, see flow sheet for assessment.    Intravenous Access:  Peripheral IV placed.    Treatment Conditions:  Biological Infusion Checklist:  ~~~ NOTE: If the patient answers yes to any of the questions below, hold the infusion and contact ordering provider or on-call provider.    1. Have you recently had an elevated temperature, fever, chills, productive cough, coughing for 3 weeks or longer or hemoptysis, abnormal vital signs, night sweats,  chest pain or have you noticed a decrease in your appetite, unexplained weight loss or fatigue? No  2. Do you have any open wounds or new incisions? No  3. Do you have any recent or upcoming hospitalizations, surgeries or dental procedures? No  4. Do you currently have or recently have had any signs of illness or infection or are you on any antibiotics? No  5. Have you had any new, sudden or worsening abdominal pain? No  6. Have you or anyone in your household received a live vaccination in the past 4 weeks? Please note:  No live vaccines while on biologic/chemotherapy until 6 months after the last treatment.  Patient can receive the flu vaccine (shot only) and the pneumovax.  It is optimal for the patient to get these vaccines mid cycle, but they can be given at any time as long as it is not on the day of the infusion. No  7. Have you recently been diagnosed with any new nervous system diseases (ie. Multiple sclerosis, Guillain [place], seizures, neurological changes) or cancer diagnosis? No  8. Are you on any form of radiation or chemotherapy? No  9. Are you pregnant or breast feeding or do you have plans of pregnancy in the future? No  10. Have you been having any signs of worsening depression or suicidal ideations?  (benlysta only) No  11. Have there been any other new onset  medical symptoms? No        Post Infusion Assessment:  Patient tolerated infusion without incident.  Patient observed for 60 minutes post Ocrevus per protocol.  Blood return noted pre and post infusion.  Site patent and intact, free from redness, edema or discomfort.  No evidence of extravasations.  Access discontinued per protocol.       Discharge Plan:   Patient discharged in stable condition accompanied by: self.  Departure Mode: Ambulatory.  Pt will call in 6 months to schedule next infusion.    Rajesh Estevez RN

## 2020-12-20 ENCOUNTER — HEALTH MAINTENANCE LETTER (OUTPATIENT)
Age: 35
End: 2020-12-20

## 2021-01-05 ENCOUNTER — OFFICE VISIT (OUTPATIENT)
Dept: FAMILY MEDICINE | Facility: CLINIC | Age: 36
End: 2021-01-05
Payer: COMMERCIAL

## 2021-01-05 VITALS
OXYGEN SATURATION: 100 % | HEART RATE: 74 BPM | DIASTOLIC BLOOD PRESSURE: 74 MMHG | TEMPERATURE: 97.8 F | SYSTOLIC BLOOD PRESSURE: 116 MMHG | HEIGHT: 70 IN | BODY MASS INDEX: 40.66 KG/M2 | WEIGHT: 284 LBS

## 2021-01-05 DIAGNOSIS — M67.432 GANGLION CYST OF WRIST, LEFT: Primary | ICD-10-CM

## 2021-01-05 DIAGNOSIS — L72.0 EPIDERMOID CYST OF NECK: ICD-10-CM

## 2021-01-05 DIAGNOSIS — E66.01 MORBID OBESITY (H): ICD-10-CM

## 2021-01-05 PROCEDURE — 99203 OFFICE O/P NEW LOW 30 MIN: CPT | Performed by: PHYSICIAN ASSISTANT

## 2021-01-05 ASSESSMENT — MIFFLIN-ST. JEOR: SCORE: 2063.47

## 2021-01-05 ASSESSMENT — PAIN SCALES - GENERAL: PAINLEVEL: NO PAIN (0)

## 2021-01-05 NOTE — PROGRESS NOTES
"  Assessment & Plan     Ganglion cyst of wrist, left  Recommend orthopedic surgeon removal.   - Orthopedic & Spine  Referral; Future    Epidermoid cyst of neck  Because of this being directly over the spine, recommend seeing general surgery for removal.   - GENERAL SURG ADULT REFERRAL; Future    Morbid obesity (H)        25 minutes spent on the date of the encounter doing chart review, history and exam, documentation and further activities as noted above     BMI:   Estimated body mass index is 40.75 kg/m  as calculated from the following:    Height as of this encounter: 1.778 m (5' 10\").    Weight as of this encounter: 128.8 kg (284 lb).   Weight management plan: Discussed healthy diet and exercise guidelines        No follow-ups on file.    Lu Gracia PA-C  [facility]    [place]     Stefania Austin is a 35 year old female who presents to clinic today for the following health issues     HPI       Concern - lump   Onset: Wrist and neck 1 year ago  Description: Raised lump, hard  Intensity: moderate  Progression of Symptoms:  same  Accompanying Signs & Symptoms: Hurts sometimes, some itching sometimes  Previous history of similar problem: None  Precipitating factors:        Worsened by: No  Alleviating factors:        Improved by: None  Therapies tried and outcome:  none       Left wrist ganglion cyst. No injury. Is bothersome at times.     Posterior neck cyst. Tender. Painful to lay on back at times.       Review of Systems   Constitutional, HEENT, cardiovascular, pulmonary, gi and gu systems are negative, except as otherwise noted.      Objective    /74 (BP Location: Right arm, Patient Position: Chair, Cuff Size: Adult Large)   Pulse 74   Temp 97.8  F (36.6  C) (Oral)   Ht 1.778 m (5' 10\")   Wt 128.8 kg (284 lb)   LMP 12/01/2020 (Approximate)   SpO2 100%   BMI 40.75 kg/m    Body mass index is 40.75 kg/m .  Physical Exam   GENERAL: healthy, alert and no " distress  NECK: no adenopathy, no asymmetry, masses, or scars and thyroid normal to palpation  RESP: lungs clear to auscultation - no rales, rhonchi or wheezes  CV: regular rate and rhythm, normal S1 S2, no S3 or S4, no murmur, click or rub, no peripheral edema and peripheral pulses strong  ABDOMEN: soft, nontender, no hepatosplenomegaly, no masses and bowel sounds normal  MS: no gross musculoskeletal defects noted, no edema

## 2021-01-12 ENCOUNTER — OFFICE VISIT (OUTPATIENT)
Dept: ORTHOPEDICS | Facility: CLINIC | Age: 36
End: 2021-01-12
Attending: PHYSICIAN ASSISTANT
Payer: COMMERCIAL

## 2021-01-12 VITALS — DIASTOLIC BLOOD PRESSURE: 73 MMHG | SYSTOLIC BLOOD PRESSURE: 133 MMHG | HEART RATE: 82 BPM | OXYGEN SATURATION: 99 %

## 2021-01-12 DIAGNOSIS — M67.432 GANGLION CYST OF WRIST, LEFT: Primary | ICD-10-CM

## 2021-01-12 PROCEDURE — 99204 OFFICE O/P NEW MOD 45 MIN: CPT | Performed by: ORTHOPAEDIC SURGERY

## 2021-01-12 ASSESSMENT — PAIN SCALES - GENERAL: PAINLEVEL: MODERATE PAIN (4)

## 2021-01-12 NOTE — LETTER
1/12/2021         RE: Stefania Austin  [address]  [facility] [address]        Dear Colleague,    Thank you for referring your patient, Stefania Austin, to the [facility]. Please see a copy of my visit note below.    SUBJECTIVE:   Stefania Austin is a 35 year old female who is seen in consultation at the request of Lu Gracia for evaluation of a left wrist mass that has been present for approximately 1 year . The mass is located on the dorsal side of the left wrist. The patient has not noticed changes in size.     Present symptoms: hurts occasionally with flexion or pushing.   Treatments tried to this point: none    Review of Systems:  Constitutional:  NEGATIVE for fever, chills, change in weight  Integumentary/Skin:  NEGATIVE for worrisome rashes, moles or lesions  Eyes:  NEGATIVE for vision changes or irritation  ENT/Mouth:  NEGATIVE for ear, mouth and throat problems  Resp:  NEGATIVE for significant cough or SOB  Breast:  NEGATIVE for masses, tenderness or discharge  CV:  NEGATIVE for chest pain, palpitations or peripheral edema  GI:  NEGATIVE for nausea, abdominal pain, heartburn, or change in bowel habits  :  Negative   Musculoskeletal:  See HPI above  Neuro:  NEGATIVE for weakness, dizziness or paresthesias  Endocrine:  NEGATIVE for temperature intolerance, skin/hair changes  Heme/allergy/immune:  NEGATIVE for bleeding problems  Psychiatric:  NEGATIVE for changes in mood or affect    Past Medical History:   Past Medical History:   Diagnosis Date     ASCUS with positive high risk HPV 2012     Bell's palsy      Cervical dysplasia 2012     Genital herpes      Hyperlipidemia LDL goal < 160      Hypertension goal BP (blood pressure) < 140/90      Major depression, single episode      Microcytic anemia      MS (multiple sclerosis) (H) 12/3/2015     Obesity 5/20/2014     Vitamin D deficiency      Past Surgical History:   Past Surgical History:   Procedure  Laterality Date     DAVINCI THYMECTOMY N/A 2016    Procedure: DAVINCI THYMECTOMY;  Surgeon: Leighton Estrella MD;  Location: [facility] OR     Family History:   Family History   Problem Relation Age of Onset     Congenital Anomalies Son         Down's     Diabetes No family hx of      C.A.D. No family hx of      Cancer No family hx of      Social History:   Social History     Tobacco Use     Smoking status: Former Smoker     Packs/day: 1.00     Years: 5.00     Pack years: 5.00     Types: Cigarettes     Start date: 1998     Quit date: 2009     Years since quittin.6     Smokeless tobacco: Never Used   Substance Use Topics     Alcohol use: Yes     Alcohol/week: 2.0 standard drinks     Types: 2 Glasses of wine per week     Comment: MONTHLY     OBJECTIVE:  Physical Exam:  /73 (BP Location: Left arm, Patient Position: Sitting, Cuff Size: Adult Regular)   Pulse 82   SpO2 99%   General Appearance: healthy, alert and no distress   Skin: no suspicious lesions or rashes  Neuro: Normal strength and tone, mentation intact and speech normal  Vascular: good pulses, and cappillary refill   Lymph: no lymphadenopathy   Psych:  mentation appears normal and affect normal/bright  Resp: no increased work of breathing     Left Wrist Exam:  Inspection: There is a mass located on the dorsal side of the left wrist that is  Size: 1 cm in diameter.  Palpation: tender, firm, somewhat mobile  Located just radial to the 4th dorsal compartment  Transillumination: yes  ROM: full.  Pain with full dorsiflexion       X-rays:  None    ASSESSMENT:   Ganglion cyst left wrist    PLAN:   Options were discussed.  Aspiration vs surgery.  She would like to have the mass excised  She would like to coordinate this with mass excision on her neck, would like them done at the same time.   I suggest MAC, preferred to local-only anesthesia in this patient.  The procedure was discussed in detail with the patient, including alternatives to  the proposed procedure, and expected recovery.  Risks were discussed, including, but not limited to infection, neurovascular injury, postoperative pain,failure to relieve pain, mass recurrence and anesthetic complications.    Postoperative course discussed with splinting x 2 weeks.    AKUA Stevenson MD  Dept. Orthopedic Surgery  [facility]         Again, thank you for allowing me to participate in the care of your patient.        Sincerely,        Riaz Stevenson MD

## 2021-01-12 NOTE — PROGRESS NOTES
SUBJECTIVE:   Stefania Austin is a 35 year old female who is seen in consultation at the request of Lu Gracia for evaluation of a left wrist mass that has been present for approximately 1 year . The mass is located on the dorsal side of the left wrist. The patient has not noticed changes in size.     Present symptoms: hurts occasionally with flexion or pushing.   Treatments tried to this point: none    Review of Systems:  Constitutional:  NEGATIVE for fever, chills, change in weight  Integumentary/Skin:  NEGATIVE for worrisome rashes, moles or lesions  Eyes:  NEGATIVE for vision changes or irritation  ENT/Mouth:  NEGATIVE for ear, mouth and throat problems  Resp:  NEGATIVE for significant cough or SOB  Breast:  NEGATIVE for masses, tenderness or discharge  CV:  NEGATIVE for chest pain, palpitations or peripheral edema  GI:  NEGATIVE for nausea, abdominal pain, heartburn, or change in bowel habits  :  Negative   Musculoskeletal:  See HPI above  Neuro:  NEGATIVE for weakness, dizziness or paresthesias  Endocrine:  NEGATIVE for temperature intolerance, skin/hair changes  Heme/allergy/immune:  NEGATIVE for bleeding problems  Psychiatric:  NEGATIVE for changes in mood or affect    Past Medical History:   Past Medical History:   Diagnosis Date     ASCUS with positive high risk HPV 2012     Bell's palsy      Cervical dysplasia 2012     Genital herpes      Hyperlipidemia LDL goal < 160      Hypertension goal BP (blood pressure) < 140/90      Major depression, single episode      Microcytic anemia      MS (multiple sclerosis) (H) 12/3/2015     Obesity 5/20/2014     Vitamin D deficiency      Past Surgical History:   Past Surgical History:   Procedure Laterality Date     DAVINCI THYMECTOMY N/A 5/9/2016    Procedure: DAVINCI THYMECTOMY;  Surgeon: Leighton Estrella MD;  Location:  OR     Family History:   Family History   Problem Relation Age of Onset     Congenital Anomalies Son         Down's     Diabetes No  family hx of      C.A.D. No family hx of      Cancer No family hx of      Social History:   Social History     Tobacco Use     Smoking status: Former Smoker     Packs/day: 1.00     Years: 5.00     Pack years: 5.00     Types: Cigarettes     Start date: 1998     Quit date: 2009     Years since quittin.6     Smokeless tobacco: Never Used   Substance Use Topics     Alcohol use: Yes     Alcohol/week: 2.0 standard drinks     Types: 2 Glasses of wine per week     Comment: MONTHLY     OBJECTIVE:  Physical Exam:  /73 (BP Location: Left arm, Patient Position: Sitting, Cuff Size: Adult Regular)   Pulse 82   SpO2 99%   General Appearance: healthy, alert and no distress   Skin: no suspicious lesions or rashes  Neuro: Normal strength and tone, mentation intact and speech normal  Vascular: good pulses, and cappillary refill   Lymph: no lymphadenopathy   Psych:  mentation appears normal and affect normal/bright  Resp: no increased work of breathing     Left Wrist Exam:  Inspection: There is a mass located on the dorsal side of the left wrist that is  Size: 1 cm in diameter.  Palpation: tender, firm, somewhat mobile  Located just radial to the 4th dorsal compartment  Transillumination: yes  ROM: full.  Pain with full dorsiflexion       X-rays:  None    ASSESSMENT:   Ganglion cyst left wrist    PLAN:   Options were discussed.  Aspiration vs surgery.  She would like to have the mass excised  She would like to coordinate this with mass excision on her neck, would like them done at the same time.   I suggest MAC, preferred to local-only anesthesia in this patient.  The procedure was discussed in detail with the patient, including alternatives to the proposed procedure, and expected recovery.  Risks were discussed, including, but not limited to infection, neurovascular injury, postoperative pain,failure to relieve pain, mass recurrence and anesthetic complications.    Postoperative course discussed with splinting x  2 weeks.    AKUA Stevenson MD  Dept. Orthopedic Surgery  [facility]

## 2021-01-13 ENCOUNTER — OFFICE VISIT (OUTPATIENT)
Dept: SURGERY | Facility: CLINIC | Age: 36
End: 2021-01-13
Attending: PHYSICIAN ASSISTANT
Payer: COMMERCIAL

## 2021-01-13 VITALS
BODY MASS INDEX: 40.32 KG/M2 | SYSTOLIC BLOOD PRESSURE: 129 MMHG | HEART RATE: 94 BPM | WEIGHT: 281 LBS | DIASTOLIC BLOOD PRESSURE: 80 MMHG

## 2021-01-13 DIAGNOSIS — L72.0 EPIDERMOID CYST OF NECK: ICD-10-CM

## 2021-01-13 PROCEDURE — 99243 OFF/OP CNSLTJ NEW/EST LOW 30: CPT | Performed by: SURGERY

## 2021-01-13 NOTE — LETTER
1/13/2021         RE: Stefania Austin  [address]  [facility] [address]        Dear Colleague,    Thank you for referring your patient, Stefania Austin, to the [facility]. Please see a copy of my visit note below.    Patient seen in consultation for neck cyst by Lu Gracia    HPI:  Patient is a 35 year old female  with complaints of cyst on the back of her neck  The patient noticed the symptoms about 1 year ago.   There is some pain, can be itchy  Not sure if has enlarged since she has known about it  No drainage of infection  nothing makes the episode better.  Patient has not family history of similar problems    Review Of Systems    Skin: as above  Ears/Nose/Throat: negative  Respiratory: No shortness of breath, dyspnea on exertion, cough, or hemoptysis  Cardiovascular: negative  Gastrointestinal: negative  Genitourinary: negative  Musculoskeletal: ganglion cyst on wrist  Neurologic: negative  Hematologic/Lymphatic/Immunologic: MS on biologic  Endocrine: negative      Past Medical History:   Diagnosis Date     ASCUS with positive high risk HPV 2012     Bell's palsy      Cervical dysplasia 2012     Genital herpes      Hyperlipidemia LDL goal < 160      Hypertension goal BP (blood pressure) < 140/90      Major depression, single episode      Microcytic anemia      MS (multiple sclerosis) (H) 12/3/2015     Obesity 5/20/2014     Vitamin D deficiency        Past Surgical History:   Procedure Laterality Date     DAVINCI THYMECTOMY N/A 5/9/2016    Procedure: DAVINCI THYMECTOMY;  Surgeon: Leighton Estrella MD;  Location:  OR       Social History     Socioeconomic History     Marital status:      Spouse name: Mukund Austin     Number of children: 2     Years of education: 12     Highest education level: Not on file   Occupational History     Occupation: PCA      Employer: OTHER   Social Needs     Financial resource strain: Not on file     Food insecurity      Worry: Not on file     Inability: Not on file     Transportation needs     Medical: Not on file     Non-medical: Not on file   Tobacco Use     Smoking status: Former Smoker     Packs/day: 1.00     Years: 5.00     Pack years: 5.00     Types: Cigarettes     Start date: 1998     Quit date: 2009     Years since quittin.6     Smokeless tobacco: Never Used   Substance and Sexual Activity     Alcohol use: Yes     Alcohol/week: 2.0 standard drinks     Types: 2 Glasses of wine per week     Comment: MONTHLY     Drug use: No     Sexual activity: Yes     Partners: Male     Birth control/protection: Condom   Lifestyle     Physical activity     Days per week: Not on file     Minutes per session: Not on file     Stress: Not on file   Relationships     Social connections     Talks on phone: Not on file     Gets together: Not on file     Attends Cheondoism service: Not on file     Active member of club or organization: Not on file     Attends meetings of clubs or organizations: Not on file     Relationship status: Not on file     Intimate partner violence     Fear of current or ex partner: Not on file     Emotionally abused: Not on file     Physically abused: Not on file     Forced sexual activity: Not on file   Other Topics Concern     Parent/sibling w/ CABG, MI or angioplasty before 65F 55M? Not Asked   Social History Narrative     Not on file       Current Outpatient Medications   Medication Sig Dispense Refill     cetirizine (ZYRTEC) 10 MG tablet Take 1 tablet (10 mg) by mouth daily 30 tablet 11     EPINEPHrine (EPIPEN/ADRENACLICK/OR ANY BX GENERIC EQUIV) 0.3 MG/0.3ML injection 2-pack Inject 0.3 mLs (0.3 mg) into the muscle as needed for anaphylaxis 0.6 mL 3     ibuprofen (ADVIL/MOTRIN) 800 MG tablet Take 800 mg by mouth as needed       Multiple Vitamins-Minerals (WOMENS MULTIVITAMIN PLUS PO) Take 1 tablet by mouth daily       ocrelizumab (OCREVUS) 300 MG/10ML SOLN injection Inject into the vein once       vitamin  D3 (CHOLECALCIFEROL) 2000 units tablet Take 2 tablets by mouth daily 30 tablet 3       Medications and history reviewed    Physical exam:  Vitals: /80   Pulse 94   Wt 127.5 kg (281 lb)   BMI 40.32 kg/m    BMI= Body mass index is 40.32 kg/m .    Constitutional: healthy, alert and no distress  Head: Normocephalic. No masses, lesions, tenderness or abnormalities  Cardiovascular: negative, PMI normal. No lifts, heaves, or thrills. RRR. No murmurs, clicks gallops or rub  Respiratory: negative, Percussion normal. Good diaphragmatic excursion. Lungs clear  Skin: There is a firm, mobile, nodular skin lesion over spinous process of T1. About 1.5 cm. Tender with palpation. Some darkening of skin here no signs of infection  Ganglion cyst noticed left wrist  Patient able to get up on table without difficulty.      Assessment:     ICD-10-CM    1. Epidermoid cyst of neck  L72.0 GENERAL SURG ADULT REFERRAL     Plan: Appears to be a nodular lesions rather than cyst. Small enough this can be removed in clinic under local. Discussed risks of bleeding and infection. Will work at scheduling a time in clinic for the procedure. No need to work on joint scheduling with Dr Stevenson.    Spencer Alvarez MD        Again, thank you for allowing me to participate in the care of your patient.        Sincerely,        Spencer Alvarez MD

## 2021-01-13 NOTE — PROGRESS NOTES
Patient seen in consultation for neck cyst by Lu Gracia    HPI:  Patient is a 35 year old female  with complaints of cyst on the back of her neck  The patient noticed the symptoms about 1 year ago.   There is some pain, can be itchy  Not sure if has enlarged since she has known about it  No drainage of infection  nothing makes the episode better.  Patient has not family history of similar problems    Review Of Systems    Skin: as above  Ears/Nose/Throat: negative  Respiratory: No shortness of breath, dyspnea on exertion, cough, or hemoptysis  Cardiovascular: negative  Gastrointestinal: negative  Genitourinary: negative  Musculoskeletal: ganglion cyst on wrist  Neurologic: negative  Hematologic/Lymphatic/Immunologic: MS on biologic  Endocrine: negative      Past Medical History:   Diagnosis Date     ASCUS with positive high risk HPV 2012     Bell's palsy      Cervical dysplasia 2012     Genital herpes      Hyperlipidemia LDL goal < 160      Hypertension goal BP (blood pressure) < 140/90      Major depression, single episode      Microcytic anemia      MS (multiple sclerosis) (H) 12/3/2015     Obesity 5/20/2014     Vitamin D deficiency        Past Surgical History:   Procedure Laterality Date     DAVINCI THYMECTOMY N/A 5/9/2016    Procedure: DAVINCI THYMECTOMY;  Surgeon: Leighton Estrella MD;  Location:  OR       Social History     Socioeconomic History     Marital status:      Spouse name: Mukund Austin     Number of children: 2     Years of education: 12     Highest education level: Not on file   Occupational History     Occupation: PCA      Employer: OTHER   Social Needs     Financial resource strain: Not on file     Food insecurity     Worry: Not on file     Inability: Not on file     Transportation needs     Medical: Not on file     Non-medical: Not on file   Tobacco Use     Smoking status: Former Smoker     Packs/day: 1.00     Years: 5.00     Pack years: 5.00     Types: Cigarettes      Start date: 1998     Quit date: 2009     Years since quittin.6     Smokeless tobacco: Never Used   Substance and Sexual Activity     Alcohol use: Yes     Alcohol/week: 2.0 standard drinks     Types: 2 Glasses of wine per week     Comment: MONTHLY     Drug use: No     Sexual activity: Yes     Partners: Male     Birth control/protection: Condom   Lifestyle     Physical activity     Days per week: Not on file     Minutes per session: Not on file     Stress: Not on file   Relationships     Social connections     Talks on phone: Not on file     Gets together: Not on file     Attends Congregational service: Not on file     Active member of club or organization: Not on file     Attends meetings of clubs or organizations: Not on file     Relationship status: Not on file     Intimate partner violence     Fear of current or ex partner: Not on file     Emotionally abused: Not on file     Physically abused: Not on file     Forced sexual activity: Not on file   Other Topics Concern     Parent/sibling w/ CABG, MI or angioplasty before 65F 55M? Not Asked   Social History Narrative     Not on file       Current Outpatient Medications   Medication Sig Dispense Refill     cetirizine (ZYRTEC) 10 MG tablet Take 1 tablet (10 mg) by mouth daily 30 tablet 11     EPINEPHrine (EPIPEN/ADRENACLICK/OR ANY BX GENERIC EQUIV) 0.3 MG/0.3ML injection 2-pack Inject 0.3 mLs (0.3 mg) into the muscle as needed for anaphylaxis 0.6 mL 3     ibuprofen (ADVIL/MOTRIN) 800 MG tablet Take 800 mg by mouth as needed       Multiple Vitamins-Minerals (WOMENS MULTIVITAMIN PLUS PO) Take 1 tablet by mouth daily       ocrelizumab (OCREVUS) 300 MG/10ML SOLN injection Inject into the vein once       vitamin D3 (CHOLECALCIFEROL) 2000 units tablet Take 2 tablets by mouth daily 30 tablet 3       Medications and history reviewed    Physical exam:  Vitals: /80   Pulse 94   Wt 127.5 kg (281 lb)   BMI 40.32 kg/m    BMI= Body mass index is 40.32  kg/m .    Constitutional: healthy, alert and no distress  Head: Normocephalic. No masses, lesions, tenderness or abnormalities  Cardiovascular: negative, PMI normal. No lifts, heaves, or thrills. RRR. No murmurs, clicks gallops or rub  Respiratory: negative, Percussion normal. Good diaphragmatic excursion. Lungs clear  Skin: There is a firm, mobile, nodular skin lesion over spinous process of T1. About 1.5 cm. Tender with palpation. Some darkening of skin here no signs of infection  Ganglion cyst noticed left wrist  Patient able to get up on table without difficulty.      Assessment:     ICD-10-CM    1. Epidermoid cyst of neck  L72.0 GENERAL SURG ADULT REFERRAL     Plan: Appears to be a nodular lesions rather than cyst. Small enough this can be removed in clinic under local. Discussed risks of bleeding and infection. Will work at scheduling a time in clinic for the procedure. No need to work on joint scheduling with Dr Stevenson.    Spencer Alvarez MD

## 2021-01-27 ENCOUNTER — OFFICE VISIT (OUTPATIENT)
Dept: SURGERY | Facility: CLINIC | Age: 36
End: 2021-01-27
Payer: COMMERCIAL

## 2021-01-27 VITALS
HEART RATE: 88 BPM | WEIGHT: 280 LBS | BODY MASS INDEX: 40.18 KG/M2 | DIASTOLIC BLOOD PRESSURE: 104 MMHG | SYSTOLIC BLOOD PRESSURE: 152 MMHG

## 2021-01-27 DIAGNOSIS — L98.9 SKIN LESION OF NECK: Primary | ICD-10-CM

## 2021-01-27 PROCEDURE — 12042 INTMD RPR N-HF/GENIT2.6-7.5: CPT | Performed by: SURGERY

## 2021-01-27 PROCEDURE — 11424 EXC H-F-NK-SP B9+MARG 3.1-4: CPT | Mod: 51 | Performed by: SURGERY

## 2021-01-27 PROCEDURE — 88305 TISSUE EXAM BY PATHOLOGIST: CPT | Performed by: PATHOLOGY

## 2021-01-27 NOTE — PROGRESS NOTES
PROCEDURE:   Written consent was obtained  The posterior neck area was prepped and appropriately anesthetized with 1% lidocaine with epinephrine. Using the usual technique, full thickness elliptical excision in total was performed. The total area excised, including lesion and margins was 3.5 cm. This appeared as a firm nodular type lesion. Closure was accomplished with interrupted 3-0 vicryl for the deep subcutaneous layer and running subcuticular 4-0 vicryl for the skin.  Incision was covered with dermabond. Final wound length 5 cm. The procedure was well tolerated and without complications. Specimen was sent to Pathology.    Spencer Alvarez MD

## 2021-01-27 NOTE — LETTER
1/27/2021         RE: Stefania Austin  [address]  [facility] [address]        Dear Colleague,    Thank you for referring your patient, Stefania Austin, to the [facility]. Please see a copy of my visit note below.    PROCEDURE:   Written consent was obtained  The posterior neck area was prepped and appropriately anesthetized with 1% lidocaine with epinephrine. Using the usual technique, full thickness elliptical excision in total was performed. The total area excised, including lesion and margins was 3.5 cm. This appeared as a firm nodular type lesion. Closure was accomplished with interrupted 3-0 vicryl for the deep subcutaneous layer and running subcuticular 4-0 vicryl for the skin.  Incision was covered with dermabond. Final wound length 5 cm. The procedure was well tolerated and without complications. Specimen was sent to Pathology.    Spencer Alvarez MD          Again, thank you for allowing me to participate in the care of your patient.        Sincerely,        Spencer Alvarez MD

## 2021-01-29 LAB — COPATH REPORT: NORMAL

## 2021-02-01 ENCOUNTER — TELEPHONE (OUTPATIENT)
Dept: SURGERY | Facility: CLINIC | Age: 36
End: 2021-02-01

## 2021-02-02 ENCOUNTER — TELEPHONE (OUTPATIENT)
Dept: NEUROLOGY | Facility: CLINIC | Age: 36
End: 2021-02-02

## 2021-02-02 RX ORDER — DIPHENHYDRAMINE HCL 25 MG
50 CAPSULE ORAL ONCE
Status: CANCELLED | OUTPATIENT
Start: 2021-02-02

## 2021-02-02 RX ORDER — ACETAMINOPHEN 325 MG/1
650 TABLET ORAL ONCE
Status: CANCELLED | OUTPATIENT
Start: 2021-02-02

## 2021-02-02 RX ORDER — HEPARIN SODIUM (PORCINE) LOCK FLUSH IV SOLN 100 UNIT/ML 100 UNIT/ML
5 SOLUTION INTRAVENOUS
Status: CANCELLED | OUTPATIENT
Start: 2021-02-02

## 2021-02-02 RX ORDER — HEPARIN SODIUM,PORCINE 10 UNIT/ML
5 VIAL (ML) INTRAVENOUS
Status: CANCELLED | OUTPATIENT
Start: 2021-02-02

## 2021-02-02 RX ORDER — METHYLPREDNISOLONE SODIUM SUCCINATE 125 MG/2ML
125 INJECTION, POWDER, LYOPHILIZED, FOR SOLUTION INTRAMUSCULAR; INTRAVENOUS ONCE
Status: CANCELLED | OUTPATIENT
Start: 2021-02-02

## 2021-02-02 NOTE — TELEPHONE ENCOUNTER
Orders signed by Dr. Peace; Patient will be contacted for a follow up appointment.    Luz Shirley MS RN Care Coordinator

## 2021-02-02 NOTE — TELEPHONE ENCOUNTER
Dr. Peace was notified by the infusion center that the patient needs updated Ocrevus orders; She is scheduled for 2/3/21; Patient is also overdue for follow up; New Ocrevus therapy plan orders placed and routed to Dr. Peace for review and signature; I will ask our medical assistant, Tabitha, to call the patient to schedule a follow up appointment.    Luz Shirley, MS RN Care Coordinator

## 2021-02-03 ENCOUNTER — INFUSION THERAPY VISIT (OUTPATIENT)
Dept: INFUSION THERAPY | Facility: CLINIC | Age: 36
End: 2021-02-03
Payer: COMMERCIAL

## 2021-02-03 VITALS — BODY MASS INDEX: 40.75 KG/M2 | WEIGHT: 284 LBS | RESPIRATION RATE: 16 BRPM

## 2021-02-03 DIAGNOSIS — G35 MS (MULTIPLE SCLEROSIS) (H): Primary | ICD-10-CM

## 2021-02-03 PROCEDURE — 96375 TX/PRO/DX INJ NEW DRUG ADDON: CPT | Performed by: NURSE PRACTITIONER

## 2021-02-03 PROCEDURE — 96415 CHEMO IV INFUSION ADDL HR: CPT | Performed by: NURSE PRACTITIONER

## 2021-02-03 PROCEDURE — 96413 CHEMO IV INFUSION 1 HR: CPT | Performed by: NURSE PRACTITIONER

## 2021-02-03 PROCEDURE — 99207 PR NO CHARGE LOS: CPT

## 2021-02-03 RX ORDER — METHYLPREDNISOLONE SODIUM SUCCINATE 125 MG/2ML
125 INJECTION, POWDER, LYOPHILIZED, FOR SOLUTION INTRAMUSCULAR; INTRAVENOUS ONCE
Status: CANCELLED | OUTPATIENT
Start: 2021-08-02

## 2021-02-03 RX ORDER — ACETAMINOPHEN 325 MG/1
650 TABLET ORAL ONCE
Status: CANCELLED | OUTPATIENT
Start: 2021-08-02

## 2021-02-03 RX ORDER — METHYLPREDNISOLONE SODIUM SUCCINATE 125 MG/2ML
125 INJECTION, POWDER, LYOPHILIZED, FOR SOLUTION INTRAMUSCULAR; INTRAVENOUS ONCE
Status: COMPLETED | OUTPATIENT
Start: 2021-02-03 | End: 2021-02-03

## 2021-02-03 RX ORDER — ACETAMINOPHEN 325 MG/1
650 TABLET ORAL ONCE
Status: COMPLETED | OUTPATIENT
Start: 2021-02-03 | End: 2021-02-03

## 2021-02-03 RX ORDER — HEPARIN SODIUM (PORCINE) LOCK FLUSH IV SOLN 100 UNIT/ML 100 UNIT/ML
5 SOLUTION INTRAVENOUS
Status: CANCELLED | OUTPATIENT
Start: 2021-08-02

## 2021-02-03 RX ORDER — DIPHENHYDRAMINE HCL 25 MG
50 CAPSULE ORAL ONCE
Status: COMPLETED | OUTPATIENT
Start: 2021-02-03 | End: 2021-02-03

## 2021-02-03 RX ORDER — HEPARIN SODIUM,PORCINE 10 UNIT/ML
5 VIAL (ML) INTRAVENOUS
Status: CANCELLED | OUTPATIENT
Start: 2021-08-02

## 2021-02-03 RX ORDER — DIPHENHYDRAMINE HCL 25 MG
50 CAPSULE ORAL ONCE
Status: CANCELLED | OUTPATIENT
Start: 2021-08-02

## 2021-02-03 RX ADMIN — Medication 50 MG: at 08:32

## 2021-02-03 RX ADMIN — Medication 250 ML: at 08:31

## 2021-02-03 RX ADMIN — METHYLPREDNISOLONE SODIUM SUCCINATE 125 MG: 125 INJECTION INTRAMUSCULAR; INTRAVENOUS at 08:33

## 2021-02-03 RX ADMIN — ACETAMINOPHEN 650 MG: 325 TABLET ORAL at 08:32

## 2021-02-03 ASSESSMENT — PAIN SCALES - GENERAL: PAINLEVEL: NO PAIN (0)

## 2021-02-03 NOTE — PROGRESS NOTES
Infusion Nursing Note:  Stefania Austin presents today for Ocrevus.    Patient seen by provider today: No   present during visit today: Not Applicable.    Note: Pt agreed with trying rapid ocrevus today. States she had some itchiness with the very first infusion but since then no reactions or concerns.    Intravenous Access:  Peripheral IV placed.    Treatment Conditions:  Biological Infusion Checklist:  ~~~ NOTE: If the patient answers yes to any of the questions below, hold the infusion and contact ordering provider or on-call provider.    1. Have you recently had an elevated temperature, fever, chills, productive cough, coughing for 3 weeks or longer or hemoptysis, abnormal vital signs, night sweats,  chest pain or have you noticed a decrease in your appetite, unexplained weight loss or fatigue? No  2. Do you have any open wounds or new incisions? No  3. Do you have any recent or upcoming hospitalizations, surgeries or dental procedures? No  4. Do you currently have or recently have had any signs of illness or infection or are you on any antibiotics? No  5. Have you had any new, sudden or worsening abdominal pain? No  6. Have you or anyone in your household received a live vaccination in the past 4 weeks? Please note:  No live vaccines while on biologic/chemotherapy until 6 months after the last treatment.  Patient can receive the flu vaccine (shot only) and the pneumovax.  It is optimal for the patient to get these vaccines mid cycle, but they can be given at any time as long as it is not on the day of the infusion. No  7. Have you recently been diagnosed with any new nervous system diseases (ie. Multiple sclerosis, Guillain [place], seizures, neurological changes) or cancer diagnosis? No  8. Are you on any form of radiation or chemotherapy? No  9. Are you pregnant or breast feeding or do you have plans of pregnancy in the future? No  10. Have you been having any signs of worsening depression or  suicidal ideations?  (benlysta only) No  11. Have there been any other new onset medical symptoms? No      Post Infusion Assessment:  Patient tolerated infusion without incident.  Patient observed for 60 minutes post Ocrevus per protocol.  Site patent and intact, free from redness, edema or discomfort.  No evidence of extravasations.  Access discontinued per protocol.       Discharge Plan:   Patient will return in 6 months for next appointment.   Patient discharged in stable condition accompanied by: self.  Departure Mode: Ambulatory.    Magdalena James RN

## 2021-03-02 ENCOUNTER — OFFICE VISIT (OUTPATIENT)
Dept: FAMILY MEDICINE | Facility: CLINIC | Age: 36
End: 2021-03-02
Payer: COMMERCIAL

## 2021-03-02 VITALS
HEART RATE: 71 BPM | TEMPERATURE: 97 F | SYSTOLIC BLOOD PRESSURE: 119 MMHG | DIASTOLIC BLOOD PRESSURE: 83 MMHG | WEIGHT: 280 LBS | RESPIRATION RATE: 100 BRPM | HEIGHT: 70 IN | BODY MASS INDEX: 40.09 KG/M2

## 2021-03-02 DIAGNOSIS — Z00.00 ROUTINE GENERAL MEDICAL EXAMINATION AT A HEALTH CARE FACILITY: Primary | ICD-10-CM

## 2021-03-02 DIAGNOSIS — G35 MS (MULTIPLE SCLEROSIS) (H): ICD-10-CM

## 2021-03-02 DIAGNOSIS — E66.01 MORBID OBESITY (H): ICD-10-CM

## 2021-03-02 DIAGNOSIS — K64.4 EXTERNAL HEMORRHOIDS: ICD-10-CM

## 2021-03-02 DIAGNOSIS — E78.5 HYPERLIPIDEMIA WITH TARGET LDL LESS THAN 160: ICD-10-CM

## 2021-03-02 PROBLEM — H10.13 ALLERGIC CONJUNCTIVITIS, BILATERAL: Status: ACTIVE | Noted: 2019-07-30

## 2021-03-02 PROBLEM — J30.1 SEASONAL ALLERGIC RHINITIS DUE TO POLLEN: Status: ACTIVE | Noted: 2019-07-30

## 2021-03-02 PROBLEM — J30.89 ALLERGIC RHINITIS DUE TO MOLD: Status: ACTIVE | Noted: 2019-07-30

## 2021-03-02 PROBLEM — T78.1XXA PFAS (POLLEN-FOOD ALLERGY SYNDROME), INITIAL ENCOUNTER: Status: ACTIVE | Noted: 2019-07-30

## 2021-03-02 LAB
CHOLEST SERPL-MCNC: 222 MG/DL
HCV AB SERPL QL IA: NONREACTIVE
HDLC SERPL-MCNC: 75 MG/DL
LDLC SERPL CALC-MCNC: 137 MG/DL
NONHDLC SERPL-MCNC: 147 MG/DL
TRIGL SERPL-MCNC: 48 MG/DL

## 2021-03-02 PROCEDURE — 99395 PREV VISIT EST AGE 18-39: CPT | Mod: 25 | Performed by: FAMILY MEDICINE

## 2021-03-02 PROCEDURE — 80061 LIPID PANEL: CPT | Performed by: FAMILY MEDICINE

## 2021-03-02 PROCEDURE — 86803 HEPATITIS C AB TEST: CPT | Performed by: FAMILY MEDICINE

## 2021-03-02 PROCEDURE — 90715 TDAP VACCINE 7 YRS/> IM: CPT | Performed by: FAMILY MEDICINE

## 2021-03-02 PROCEDURE — 36415 COLL VENOUS BLD VENIPUNCTURE: CPT | Performed by: FAMILY MEDICINE

## 2021-03-02 PROCEDURE — 87624 HPV HI-RISK TYP POOLED RSLT: CPT | Performed by: FAMILY MEDICINE

## 2021-03-02 PROCEDURE — G0145 SCR C/V CYTO,THINLAYER,RESCR: HCPCS | Performed by: FAMILY MEDICINE

## 2021-03-02 PROCEDURE — 90471 IMMUNIZATION ADMIN: CPT | Performed by: FAMILY MEDICINE

## 2021-03-02 RX ORDER — ASPIRIN 81 MG
100 TABLET, DELAYED RELEASE (ENTERIC COATED) ORAL DAILY
Qty: 30 TABLET | Refills: 0 | Status: SHIPPED | OUTPATIENT
Start: 2021-03-02 | End: 2021-08-12

## 2021-03-02 SDOH — ECONOMIC STABILITY: INCOME INSECURITY: HOW HARD IS IT FOR YOU TO PAY FOR THE VERY BASICS LIKE FOOD, HOUSING, MEDICAL CARE, AND HEATING?: NOT ASKED

## 2021-03-02 SDOH — ECONOMIC STABILITY: FOOD INSECURITY: WITHIN THE PAST 12 MONTHS, THE FOOD YOU BOUGHT JUST DIDN'T LAST AND YOU DIDN'T HAVE MONEY TO GET MORE.: NOT ASKED

## 2021-03-02 SDOH — ECONOMIC STABILITY: TRANSPORTATION INSECURITY
IN THE PAST 12 MONTHS, HAS THE LACK OF TRANSPORTATION KEPT YOU FROM MEDICAL APPOINTMENTS OR FROM GETTING MEDICATIONS?: NOT ASKED

## 2021-03-02 SDOH — ECONOMIC STABILITY: TRANSPORTATION INSECURITY
IN THE PAST 12 MONTHS, HAS LACK OF TRANSPORTATION KEPT YOU FROM MEETINGS, WORK, OR FROM GETTING THINGS NEEDED FOR DAILY LIVING?: NOT ASKED

## 2021-03-02 SDOH — ECONOMIC STABILITY: FOOD INSECURITY: WITHIN THE PAST 12 MONTHS, YOU WORRIED THAT YOUR FOOD WOULD RUN OUT BEFORE YOU GOT MONEY TO BUY MORE.: NOT ASKED

## 2021-03-02 ASSESSMENT — ENCOUNTER SYMPTOMS
PALPITATIONS: 0
EYE PAIN: 0
HEMATOCHEZIA: 0
CONSTIPATION: 0
HEMATURIA: 0
FEVER: 0
ABDOMINAL PAIN: 0
HEADACHES: 0
JOINT SWELLING: 0
NERVOUS/ANXIOUS: 0
FREQUENCY: 0
ARTHRALGIAS: 0
SORE THROAT: 0
DIARRHEA: 0
DYSURIA: 0
SHORTNESS OF BREATH: 0
PARESTHESIAS: 0
RECTAL PAIN: 1
DIZZINESS: 0
CHILLS: 0
MYALGIAS: 0
COUGH: 0
NAUSEA: 0
WEAKNESS: 0
BREAST MASS: 0
HEARTBURN: 0

## 2021-03-02 ASSESSMENT — MIFFLIN-ST. JEOR: SCORE: 2045.32

## 2021-03-02 NOTE — RESULT ENCOUNTER NOTE
Your results are normal.  Your final test results are pending.  Please check your chart again within 2 - 3 days. You will receive further instruction when your full test result panel is complete.     Porsha Veliz MD

## 2021-03-02 NOTE — PROGRESS NOTES
SUBJECTIVE:   CC: Stefania Austin is an 35 year old woman who presents for preventive health visit.     Patient has been advised of split billing requirements and indicates understanding: Yes     She sees neurology yearly for stable MS.     Healthy Habits:     Getting at least 3 servings of Calcium per day:  Yes    Bi-annual eye exam:  Yes    Dental care twice a year:  Yes    Sleep apnea or symptoms of sleep apnea:  None    Diet:  Regular (no restrictions)    Frequency of exercise:  1 day/week    Duration of exercise:  15-30 minutes    Taking medications regularly:  Yes    Medication side effects:  None    PHQ-2 Total Score: 0    Additional concerns today:  No    Today's PHQ-2 Score:   PHQ-2 (  [employer]) 3/2/2021   Q1: Little interest or pleasure in doing things 0   Q2: Feeling down, depressed or hopeless 0   PHQ-2 Score 0   Q1: Little interest or pleasure in doing things Not at all   Q2: Feeling down, depressed or hopeless Not at all   PHQ-2 Score 0       Abuse: Current or Past (Physical, Sexual or Emotional) - No  Do you feel safe in your environment? Yes    Have you ever done Advance Care Planning? (For example, a Health Directive, POLST, or a discussion with a medical provider or your loved ones about your wishes): No, advance care planning information given to patient to review.  Patient declined advance care planning discussion at this time.    Social History     Tobacco Use     Smoking status: Former Smoker     Packs/day: 1.00     Years: 5.00     Pack years: 5.00     Types: Cigarettes     Start date: 1998     Quit date: 2009     Years since quittin.7     Smokeless tobacco: Never Used   Substance Use Topics     Alcohol use: Yes     Alcohol/week: 2.0 standard drinks     Types: 2 Glasses of wine per week     Comment: MONTHLY     If you drink alcohol do you typically have >3 drinks per day or >7 drinks per week? No    Alcohol Use 3/2/2021   Prescreen: >3 drinks/day or >7 drinks/week? No    Prescreen: >3 drinks/day or >7 drinks/week? -       Any new diagnosis of family breast, ovarian, or bowel cancer? No     Reviewed orders with patient.  Reviewed health maintenance and updated orders accordingly - Yes  Patient Active Problem List   Diagnosis     History of cervical dysplasia     Hyperlipidemia with target LDL less than 160     Vitamin D deficiency     Hypertension goal BP (blood pressure) < 140/90     Urge incontinence     MS (multiple sclerosis) (H)     PFAS (pollen-food allergy syndrome), initial encounter     Seasonal allergic rhinitis due to pollen     Allergic rhinitis due to mold     Allergic conjunctivitis, bilateral     Morbid obesity (H)     Past Surgical History:   Procedure Laterality Date     DAVINCI THYMECTOMY N/A 2016    Procedure: DAVINCI THYMECTOMY;  Surgeon: Leighton Estrella MD;  Location:  OR       Social History     Tobacco Use     Smoking status: Former Smoker     Packs/day: 1.00     Years: 5.00     Pack years: 5.00     Types: Cigarettes     Start date: 1998     Quit date: 2009     Years since quittin.7     Smokeless tobacco: Never Used   Substance Use Topics     Alcohol use: Yes     Alcohol/week: 2.0 standard drinks     Types: 2 Glasses of wine per week     Comment: MONTHLY     Family History   Problem Relation Age of Onset     Congenital Anomalies Son         Down's     Hypertension Mother      Diabetes No family hx of      C.A.D. No family hx of      Cancer No family hx of          Current Outpatient Medications   Medication Sig Dispense Refill     cetirizine (ZYRTEC) 10 MG tablet Take 1 tablet (10 mg) by mouth daily 30 tablet 11     docusate sodium (COLACE) 100 MG tablet Take 1 tablet (100 mg) by mouth daily 30 tablet 0     EPINEPHrine (EPIPEN/ADRENACLICK/OR ANY BX GENERIC EQUIV) 0.3 MG/0.3ML injection 2-pack Inject 0.3 mLs (0.3 mg) into the muscle as needed for anaphylaxis 0.6 mL 3     ocrelizumab (OCREVUS) 300 MG/10ML SOLN injection Inject  into the vein once       vitamin D3 (CHOLECALCIFEROL) 2000 units tablet Take 2 tablets by mouth daily (Patient not taking: Reported on 3/2/2021) 30 tablet 3     Allergies   Allergen Reactions     Seasonal Allergies        Breast CA Risk Screening:  No flowsheet data found.  No flowsheet data found.     Patient under 40 years of age: Routine Mammogram Screening not recommended.   Pertinent mammograms are reviewed under the imaging tab.    History of abnormal Pap smear: NO - age 30-65 PAP every 5 years with negative HPV co-testing recommended  PAP / HPV Latest Ref Rng & Units 10/1/2015 10/1/2014   PAP - NIL NIL   HPV 16 DNA NEG Negative -   HPV 18 DNA NEG Negative -   OTHER HR HPV NEG Negative -     Reviewed and updated as needed this visit by clinical staff  Tobacco  Allergies  Meds  Problems  Med Hx  Surg Hx  Fam Hx  Soc Hx          Reviewed and updated as needed this visit by Provider     Problems  Med Hx  Surg Hx  Fam Hx             Review of Systems   Constitutional: Negative for chills and fever.   HENT: Negative for congestion, ear pain, hearing loss and sore throat.    Eyes: Negative for pain and visual disturbance.   Respiratory: Negative for cough and shortness of breath.    Cardiovascular: Negative for chest pain, palpitations and peripheral edema.   Gastrointestinal: Positive for rectal pain. Negative for abdominal pain, constipation, diarrhea, heartburn, hematochezia and nausea.   Breasts:  Negative for tenderness, breast mass and discharge.   Genitourinary: Negative for dysuria, frequency, genital sores, hematuria, pelvic pain, urgency, vaginal bleeding and vaginal discharge.   Musculoskeletal: Negative for arthralgias, joint swelling and myalgias.   Skin: Negative for rash.   Neurological: Negative for dizziness, weakness, headaches and paresthesias.   Psychiatric/Behavioral: Negative for mood changes. The patient is not nervous/anxious.           OBJECTIVE:   /83   Pulse 71   Temp 97  " F (36.1  C) (Oral)   Resp (!) 100   Ht 1.778 m (5' 10\")   Wt 127 kg (280 lb)   LMP 02/15/2021 (Approximate)   Breastfeeding No   BMI 40.18 kg/m    Physical Exam  GENERAL: alert and no distress  EYES: Eyes grossly normal to inspection, PERRL and conjunctivae and sclerae normal  HENT: ear canals and TM's normal, nose and mouth without ulcers or lesions  NECK: no adenopathy, no asymmetry, masses, or scars and thyroid normal to palpation  RESP: lungs clear to auscultation - no rales, rhonchi or wheezes  CV: regular rate and rhythm, normal S1 S2, no S3 or S4, no murmur, click or rub, no peripheral edema   ABDOMEN: soft, nontender, no hepatosplenomegaly, no masses and bowel sounds normal   (female): normal female external genitalia, normal urethral meatus, vaginal mucosa pink, moist, well rugated, and normal cervix/adnexa/uterus without masses or discharge  RECTAL: external hemorrhoid  MS: no gross musculoskeletal defects noted, no edema  SKIN: no suspicious lesions or rashes  NEURO: Normal strength and tone, mentation intact and speech normal  PSYCH: mentation appears normal, affect normal/bright    Diagnostic Test Results:  Labs reviewed in Epic    ASSESSMENT/PLAN:   (Z00.00) Routine general medical examination at a health care facility  (primary encounter diagnosis)  Plan: Hepatitis C Screen Reflex to HCV RNA Quant and         Genotype, Lipid panel reflex to direct LDL         Non-fasting, Pap imaged thin layer screen with         HPV - recommended age 30 - 65 years (select HPV        order below), HPV High Risk Types DNA Cervical          (G35) MS (multiple sclerosis) (H)  Comment: stable   Plan: follow-up per neurology     (E78.5) Hyperlipidemia with target LDL less than 160  (E66.01) Morbid obesity (H)  Plan: Lipid panel reflex to direct LDL Non-fasting        Counseled to make better food choices, exercise as tolerated, and lose weight.     (K64.4) External hemorrhoids  Plan: docusate sodium (COLACE) 100 " "MG tablet        Call or return to clinic as needed if these symptoms worsen or fail to improve as anticipated.       Patient has been advised of split billing requirements and indicates understanding: No  COUNSELING:  Reviewed preventive health counseling, as reflected in patient instructions  Special attention given to:        Regular exercise       Healthy diet/nutrition       Contraception       Osteoporosis prevention/bone health       Consider Hep C screening for all patients one time for ages 18-79 years       HIV screeninx in teen years, 1x in adult years, and at intervals if high risk       The ASCVD Risk score (Dawitbuck PAZ Jr., et al., 2013) failed to calculate for the following reasons:    The 2013 ASCVD risk score is only valid for ages 40 to 79    Estimated body mass index is 40.18 kg/m  as calculated from the following:    Height as of this encounter: 1.778 m (5' 10\").    Weight as of this encounter: 127 kg (280 lb).    Weight management plan: Discussed healthy diet and exercise guidelines    She reports that she quit smoking about 11 years ago. Her smoking use included cigarettes. She started smoking about 23 years ago. She has a 5.00 pack-year smoking history. She has never used smokeless tobacco.      Counseling Resources:  ATP IV Guidelines  Pooled Cohorts Equation Calculator  Breast Cancer Risk Calculator  BRCA-Related Cancer Risk Assessment: FHS-7 Tool  FRAX Risk Assessment  ICSI Preventive Guidelines  Dietary Guidelines for Americans,   USDA's MyPlate  ASA Prophylaxis  Lung CA Screening    Porsha Veliz MD  [facility]  "

## 2021-03-02 NOTE — PATIENT INSTRUCTIONS
You can get assistance with [employer] and password help by calling the central [employer] line at [phone], with support 24 hours a day/7 days a week.    Preventive Health Recommendations  Female Ages 26 - 39  Yearly exam:   See your health care provider every year in order to    Review health changes.     Discuss preventive care.      Review your medicines if you your doctor has prescribed any.    Until age 30: Get a Pap test every three years (more often if you have had an abnormal result).    After age 30: Talk to your doctor about whether you should have a Pap test every 3 years or have a Pap test with HPV screening every 5 years.   You do not need a Pap test if your uterus was removed (hysterectomy) and you have not had cancer.  You should be tested each year for STDs (sexually transmitted diseases), if you're at risk.   Talk to your provider about how often to have your cholesterol checked.  If you are at risk for diabetes, you should have a diabetes test (fasting glucose).  Shots: Get a flu shot each year. Get a tetanus shot every 10 years.   Nutrition:     Eat at least 5 servings of fruits and vegetables each day.    Eat whole-grain bread, whole-wheat pasta and brown rice instead of white grains and rice.    Get adequate Calcium and Vitamin D.     Lifestyle    Exercise at least 150 minutes a week (30 minutes a day, 5 days of the week). This will help you control your weight and prevent disease.    Limit alcohol to one drink per day.    No smoking.     Wear sunscreen to prevent skin cancer.    See your dentist every six months for an exam and cleaning.    Patient Education     Treating Hemorrhoids: Self-Care     Follow your healthcare provider s advice about caring for your hemorrhoids at home. Some treatments help relieve symptoms right away. Others involve making changes in your diet and exercise habits. These can help ease constipation and prevent hemorrhoids from coming back.  Relieving symptoms  Your  healthcare provider may prescribe anti-inflammatory medicine to help ease your symptoms. These tips will also help relieve pain and swelling.    Take sitz baths. Taking a sitz bath means sitting in a few inches of warm bath water. Soaking for 10 minutes twice a day can provide relief from painful hemorrhoids. It can also help the area stay clean.    Develop good bowel habits. Use the bathroom when you need to. Don t ignore the urge to move your bowels. This can lead to constipation, hard stools, and straining. Also, don t read while on the toilet. Sit only as long as needed. Wipe gently with soft, unscented toilet tissue or baby wipes.    Use ice packs. Placing an ice pack on an external hemorrhoid can help relieve pain right away. It will also help reduce the blood clot. Use the ice for 15 to 20 minutes at a time. Keep a cloth between the ice and your skin to prevent skin damage.    Use other measures. Laxatives and enemas can help ease constipation. But use them only on your healthcare provider s advice. For symptom relief, try using cotton pads soaked in witch hazel. These are available at most drugstores. Over-the-counter hemorrhoid ointments and petroleum jelly can also provide relief.  Add fiber to your diet  Adding fiber to your diet can help relieve constipation by making stools softer and easier to pass. To increase your fiber intake, your healthcare provider may recommend a bulking agent, such as psyllium. This is a high-fiber supplement available at most grocery stores and drugstores. Eating more fiber-rich foods will also help. There are two types of fiber:    Insoluble fiber is the main ingredient in bulking agents. It s also found in foods such as wheat bran, whole-grain breads, fresh fruits, and vegetables.    Soluble fiber is found in foods such as oat bran. Although soluble fiber is good for you, it may not ease constipation as much as foods high in insoluble fiber.  Drink more water  Along with a  high-fiber diet, drinking more water can help ease constipation. This is because insoluble fiber absorbs water, making stools soft and bulky. Be sure to drink plenty of water throughout the day. Drinking fruit juices, such as prune juice or apple juice, can also help prevent constipation.  Get more exercise  Regular exercise aids digestion and helps prevent constipation. It s also great for your health. So talk with your healthcare provider about starting an exercise program. Low-impact activities, such as swimming or walking, are good places to start. Take it easy at first. And remember to drink plenty of water when you exercise.  High-fiber foods Easy ways to add fiber  High-fiber foods offer many benefits. By making your stools softer, they help heal and prevent swollen hemorrhoids. They may also help reduce the risk of colon and rectal cancer. Best of all, they re usually low in calories and taste great. Here are some examples of fiber-rich foods.    Whole grains, such as wheat bran, corn bran, and brown rice    Vegetables, especially carrots, broccoli, cabbage, and peas    Fruits, such as apples, bananas, raisins, peaches, prunes, and pears    Nuts and legumes, especially peanuts, lentils, and kidney beans  Easy ways to add fiber  The tips below offer some simple ways to add more high-fiber foods to your meals.    Start your day with a high-fiber breakfast. Eat a wheat bran cereal along with a sliced banana. Or, try peanut butter on whole-wheat toast.    Eat carrot sticks for snacks. They re easy to prepare, taste great, and are low in calories.    Use whole-grain breads instead of white bread for sandwiches.    Eat fruits for treats. Try an apple and some raisins instead of a candy bar.  [employer] last reviewed this educational content on 6/1/2019 2000-2020 [employer]. [address] [place] [address], [place] [address]. All rights reserved. This information is not intended as a substitute for  professional medical care. Always follow your healthcare professional's instructions.

## 2021-03-05 LAB
COPATH REPORT: NORMAL
PAP: NORMAL

## 2021-03-08 LAB
FINAL DIAGNOSIS: NORMAL
HPV HR 12 DNA CVX QL NAA+PROBE: NEGATIVE
HPV16 DNA SPEC QL NAA+PROBE: NEGATIVE
HPV18 DNA SPEC QL NAA+PROBE: NEGATIVE
SPECIMEN DESCRIPTION: NORMAL
SPECIMEN SOURCE CVX/VAG CYTO: NORMAL

## 2021-03-15 ENCOUNTER — VIRTUAL VISIT (OUTPATIENT)
Dept: NEUROLOGY | Facility: CLINIC | Age: 36
End: 2021-03-15
Payer: COMMERCIAL

## 2021-03-15 DIAGNOSIS — G35 MS (MULTIPLE SCLEROSIS) (H): Primary | ICD-10-CM

## 2021-03-15 PROCEDURE — 99442 PR PHYSICIAN TELEPHONE EVALUATION 11-20 MIN: CPT | Performed by: PSYCHIATRY & NEUROLOGY

## 2021-03-15 NOTE — PATIENT INSTRUCTIONS
1. Proceed with your next Ocrevus infusion in August-target date is August 3, 2021    2. Please have blood tests drawn on the day of the infusion PRIOR to your treatment    3. MRI scans of the brain and cervical spine in 6 months and return to clinic after

## 2021-03-15 NOTE — NURSING NOTE
I called patient and attempted the rooming process. Pt did not answer, I left a message for patient to call back.  If they do not I will attempt to all back in 10-15 min.    Dorys Gibbons LPN   [facility]

                             Created on: 2020



Kate April

External Reference #: [id number]

: 1976

Sex: Female



Demographics





                          Address                   [address]

[place]  [address]

 

                          Preferred Language        Unknown

 

                          Marital Status            Unknown

 

                          Jehovah's witness Affiliation     Unknown

 

                          Race                      Unknown

 

                          Ethnic Group              Unknown





Author





                          Author                    Migration, April Doctor

 

                          Organization              [facility]

 

                          Address                   Unknown

 

                          Phone                     Unavailable







Care Team Providers





                    Care Team Member Name Role                Phone

 

                    Migration,  Doctor  Unavailable         Unavailable







PROBLEMS





          Type      Condition ICD9-CM Code JMD40-NG Code Onset Dates Condition S

tatus SNOMED 

Code

 

          Problem   Duarte's neuroma of right foot           G57.61             

 Active    32189863

 

          Problem   History of leukocytosis           Z86.2               Active

    [id number]

 

          Problem   Anxiety             F41.9               Active    31852243

 

                          Problem                   Type 2 diabetes mellitus wit

hout complication, without long-term current

use of insulin              E11.9                     Active       [id number]

 

          Problem   Seasonal allergic rhinitis due to pollen           J30.1    

           Active    67847153

 

          Problem   Tubular adenoma of colon           D12.6               Activ

e    974548401

 

          Problem   GERD with esophagitis           K21.0               Active  

  [id number]

 

          Problem   Mixed hyperlipidemia           E78.2               Active   

 573415999

 

           Problem    Seasonal allergic rhinitis due to other allergic trigger  

          J30.89                

Active                                  [id number]

 

          Problem   Dysthymic disorder           F34.1               Active    7

[id number]

 

          Problem   Arthritis           M19.90              Active    9219186

 

          Problem   Non morbid obesity           E66.9               Active    4

[id number]







ALLERGIES

No Information



ENCOUNTERS





                Encounter       Location        Date            Diagnosis

 

                    [facility] [address] [place] [address] 04 

Mar, 2020                                

 

                    [facility] [id number] [place] [address]                                Acute pain of left knee M25.562

 

                    [facility] [address] [place] [address] 15 

Brandon, 2020                               Leg pain, left M79.605

 

                          [facility]  [address] [facility] [id number]

[address] [place] 

[address]                              Leg pain, left M79.605

 

                    [facility] [address] [place] [address]                                Type 2 diabetes mellitus without complic

ation, without long-term 

current use of insulin E11.9 and GERD with esophagitis K21.0

 

                    [place] [address] [address] 01 

Oct, 2019                               Encounter for immunization Z23

 

                    [place] [address] [address]                                Type 2 diabetes mellitus without complic

ation, without long-term 

current use of insulin E11.9

 

                    [place] [address] [address]                                 

 

                    [place] [address] [address]                                Type 2 diabetes mellitus without complic

ation, without long-term 

current use of insulin E11.9

 

                    [place] [address] [address]                                Weight loss R63.4

 

                    [place] [address] [address] 31 

May, 2019                               Type 2 diabetes mellitus without complic

ation, without long-term 

current use of insulin E11.9

 

                    [place] [address] [address] 31 

May, 2019                               Type 2 diabetes mellitus without complic

ation, without long-term 

current use of insulin E11.9 and Non morbid obesity E66.9

 

                          [facility] [place] [address] [address]

[address] [place] [address] 

[address]                14 May, 2019              Seasonal allergic rhinitis d

ue to pollen J30.1 and Sore 

throat J02.9

 

                          [facility] [place] [address] [place] [id number]

[address] [place] [address] 

[address]                              Arthritis M19.90

 

                    [place] [address] [address]                                Seasonal allergic rhinitis due to other 

allergic trigger J30.89 and 

Dysthymic disorder F34.1

 

                    [place] [address] [address] 05 

Dec, 2018                               Bilateral otitis media with effusion H65

.93 and Anxiety F41.9

 

                    [place] [address] [address]                                Type 2 diabetes mellitus without complic

ation, without long-term 

current use of insulin E11.9

 

                    [place] [address] [address]                                Pharyngitis due to Streptococcus species

 J02.0

 

                    [place] [address] [address]                                 

 

                          [facility] [place] [address] 

[address]                10 Jul, 2018              Fever, unspecified fever cau

se R50.9 and Lymphadenopathy

R59.1

 

                          [facility] [place] [address] 

[address]                              Pharyngitis due to other org

anism J02.8

 

                    [place] [address] [address]                                 

 

                    [place] [address] [address]                                Type 2 diabetes mellitus without complic

ation, without long-term 

current use of insulin E11.9 and Other eczema L30.8

 

                          [facility] [place] [address] 

[address]                              Acute pain of left shoulder 

M25.512

 

                    [place] [address] [address]                                 

 

                    [place] [address] [address] 15 

2018                               Type 2 diabetes mellitus without complic

ation, without long-term 

current use of insulin E11.9

 

                    [place] [address] [address]                                Type 2 diabetes mellitus without complic

ation, without long-term 

current use of insulin E11.9 ; Tubular adenoma of colon D12.6 ; Mixed 
hyperlipidemia E78.2 ; History of leukocytosis Z86.2 and Screening breast 
examination Z12.31

 

                    [place] [address] [address]                                Metatarsalgia of right foot M77.41 and T

ype 2 diabetes mellitus 

without complication, without long-term current use of insulin E11.9

 

                    [place] [address] [address]                                Capsulitis M77.9 and Metatarsalgia of ri

ght foot M77.41

 

                    [place] [address] [address] 08 

Sep, 2017                               Capsulitis M77.9

 

                    [place] [address] [address] 06 

Sep, 2017                                

 

                    [place] [address] [address] 03 

Aug, 2017                               Type 2 diabetes mellitus without complic

ation, without long-term 

current use of insulin E11.9 ; Tubular adenoma of colon D12.6 and Mixed 
hyperlipidemia E78.2

 

                    [place] [address] [address]                                Metatarsalgia of right foot M77.41 and C

apsulitis M77.9

 

                    [place] [address] [address]                                 

 

                          [facility]  [address] [facility] [id number]

[address] 

[address]                              Duarte's neuroma of right fo

ot G57.61

 

                    [place] [address] [address]                                Mixed hyperlipidemia E78.2 and Type 2 di

abetes mellitus without 

complication, without long-term current use of insulin E11.9

 

                    [place] [address] [address] 10 

Brandon, 2017                               Type 2 diabetes mellitus without complic

ation, without long-term 

current use of insulin E11.9 ; Tubular adenoma of colon D12.6 and Mixed 
hyperlipidemia E78.2

 

                    [place] [address] [address] 22 

Dec, 2016                               Mixed hyperlipidemia E78.2

 

                    [place] [address] [address]                                 

 

                    [place] [address] [address]                                Mixed hyperlipidemia E78.2

 

                    [place] [address] [address]                                Mixed hyperlipidemia E78.2

 

                    [place] [address] [address] 08 

Sep, 2016                                

 

                    [place] [address] [address] 10 

Aug, 2016                               Type 2 diabetes mellitus without complic

ation, without long-term 

current use of insulin E11.9 ; Helicobacter pylori (H. pylori) infection A04.8 
and Mixed hyperlipidemia E78.2

 

                    [place] [address] [address] 08 

Aug, 2016                               Type 2 diabetes mellitus without complic

ation, without long-term 

current use of insulin E11.9

 

                    [place] [address] [address] 03 

Aug, 2016                               Type 2 diabetes mellitus without complic

ation, without long-term 

current use of insulin E11.9

 

                    [place] [address] [address] 02 

Aug, 2016                               Dyspepsia R10.13 ; Upper abdominal pain 

R10.10 ; Bowel habit changes 

R19.4 ; History of leukocytosis Z86.2 and Helicobacter pylori (H. pylori) 
infection A04.8

 

                          [facility]  [address] [facility] [id number]

[address] 

[address]                27 May, 2016              Environmental allergies Z91.

09

 

                    [place] [address] [address]                                Left leg pain M79.605 ; Localized swelli

ng of lower leg R22.40 and 

Upper respiratory infection J06.9

 

                    [place] [address] [address] 28 

Dec, 2015                                

 

                    [place] [address] [address] 21 

Dec, 2015                               Left leg pain M79.605

 

                    [place] [address] [address] 10 

Dec, 2015                               Left leg pain M79.605 and Localized swel

ling of lower leg R22.40

 

                    [place] [address] [address] 01 

Dec, 2015                               Left leg pain M79.605

 

                    [place] [address] [address]                                Encounter for immunization Z23

 

                    [place] [address] [address] 12 

Oct, 2015                               Bronchitis J40

 

                    [place] [address] [address] 30 

Sep, 2015                               Physical exam, pre-employment V70.5 ; En

counter for PPD test V74.1 and

Hyperlipidemia 272.4

 

                    [place] [address] [address] 21 

Sep, 2015                                

 

                    [place] [address] [address] 21 

Sep, 2015                                

 

                    [place] [address] [address] 09 

Sep, 2015                                

 

                    [place] [address] [address] 04 

Sep, 2015                               Elevated blood sugar 790.29

 

                    [place] [address] [address] 03 

Sep, 2015                               Elevated blood sugar 790.29

 

                    [place] [address] [place] [address] 03 

Sep, 2015                               Palpitations 785.1

 

                    [facility] [id number] [place] [address] 01 

Sep, 2015                               Palpitations 785.1

 

                    [facility] [id number] [place] [address]                                 

 

                    [facility] [address] [address] 28 

May, 2015                               Hand pain, right 729.5 and Depression wi

th anxiety 300.4

 

                    [facility] [address] [address]                                 

 

                    [facility] [address] [address]                                 

 

                    [facility] [address] [address] 05 

Mar, 2015                                

 

                    [facility] [address] [address] 05 

Mar, 2015                                

 

                    [facility] [id number] [place] [address]                                 

 

                    [facility] [address] [address]                                 

 

                    [facility] [id number] [place] [address] 10 

Feb, 2015                                

 

                    [facility] [id number] [place] [address] 10 

Feb, 2015                                

 

                    [facility] [id number] [place] [address]                                 

 

                    [facility] [id number] [place] [address]                                 

 

                    [facility] [id number] [place] [address]                                 

 

                    [facility] [address] [address]                                 

 

                    [facility] [address] [place] [address]                                 

 

                    [facility] [address] [place] [address]                                 

 

                    [facility] [address] [place] [address]                                 

 

                    [facility] [address] [place] [address]                                 

 

                    [facility] [address] [place] [address]                                 

 

                    [facility] [address] [place] [address]                                 

 

                    [facility] [address] [place] [address]                                 

 

                    [facility] [address] [place] [address]                                 

 

                    [facility] [address] [place] [address] 15 

Brandon, 2015                                

 

                    [facility] [address] [place] [address] 15 

Brandon, 2015                                

 

                    [facility] [address] [place] [address]                                 

 

                    [facility] [address] [place] [address]                                 

 

                    [facility] [address] [place] [address] 15 

Oct, 2014                                

 

                    [facility] [address] [place] [address] 15 

Oct, 2014                                

 

                    [facility] [address] [place] [address] 14 

Oct, 2014                                

 

                    [facility] [address] [place] [address] 14 

Oct, 2014                                

 

                    [facility] [address] [place] [address] 07 

Oct, 2014                                

 

                    [facility] [address] [id number] [facility] [place] [address] 07 

Oct, 2014                                

 

                    [facility] [address] [facility] [id number] [facility] [place] [address] 12 

Sep, 2014                                

 

                    [facility] [address] [facility] [place] [address] 12 

Sep,                                 

 

                    [facility] [address] [facility] [id number] [facility] [place] [address] 04 

Sep,                                 

 

                    [facility] [address] [facility] [id number] [facility] [place] [address] 03 

Sep,                                 

 

                    [facility] [address] [facility] [place] [address] 03 

Sep,                                 

 

                    [facility] [address] [facility] [place] [address] 02 

Sep, 2014                                

 

                    [facility] [address] [place] [address] 02 

Sep,                                 

 

                    [facility] [address] [facility] [place] [address] 30 

Aug, 2014                                

 

                    [facility] [address] [place] [address] 30 

Aug, 2014                                

 

                    [facility] [address] [place] [address] 19 

Aug, 2014                                

 

                    [facility] [address] [place] [address] 19 

Aug, 2014                                

 

                    [facility] [address] [place] [address] 14 

Aug, 2014                                

 

                    [facility] [address] [id number] [place] [address] 14 

Aug, 2014                                

 

                    [facility] [address] [place] [address] 13 

Aug, 2014                                

 

                    [facility] [address] [place] [address] 13 

Aug, 2014                                

 

                    [facility] [address] [id number] [facility] [place] [address] ,                                 

 

                    [facility] [address] [id number] [facility] [place] [address] ,                                 

 

                    [facility] [address] [facility] [id number] [facility] [place] [address] ,                                 

 

                    [facility] [address] [facility] [id number] [facility] [place] [address] ,                                 

 

                    [facility] [address] [facility] [id number] [facility] [place] [address] ,                                 

 

                    [facility] [address] [id number] [facility] [place] [address] ,                                 

 

                    [facility] [address] [facility] [id number] [place] [address] ,                                 

 

                    [facility] [address] [facility] [id number] [place] [address] ,                                 

 

                    [facility] [address] [facility] [id number] [place] [address] ,                                 

 

                    [facility] [address] [facility] [id number] [facility] [place] [address] ,                                 

 

                    [facility] [address] [facility] [id number] [place] [address] ,                                 

 

                    [facility] [address] [facility] [id number] [place] [address] ,                                 

 

                    [facility] [address] [facility] [id number] [place] [address] ,                                 

 

                    [facility] [address] [id number] [place] [address] ,                                 

 

                    [facility] [address] [id number] [facility] [place] [address] ,                                 

 

                    [facility] [address] [facility] [id number] [place] [address] ,                                 

 

                    [facility] [address] [place] [address]                                 

 

                    [facility] [address] [place] [address]                                 

 

                    [facility] [id number] [place] [address]                                 

 

                    [facility] [id number] [place] [address]                                 

 

                    [facility] [id number] [place] [address]                                 

 

                    [facility] [id number] [place] [address]                                 

 

                    [facility] [id number] [place] [address]                                 

 

                    [facility] [id number] [place] [address]                                 

 

                    [facility] [id number] [place] [address]                                 

 

                    [facility] [id number] [place] [address]                                 

 

                    [facility] [id number] [place] [address]                                 

 

                    [facility] [id number] [place] [address] 27 

Mar, 2014                                

 

                    [facility] [id number] [place] [address] 27 

Mar, 2014                                

 

                    [facility] [id number] [place] [address]                                 

 

                    [facility] [id number] [place] [address]                                 

 

                    [facility] [id number] [place] [address]                                 

 

                    [facility] [id number] [place] [address]                                 

 

                    [facility] [id number] [place] [address] 08 

Oct, 2013                                

 

                    [facility] [id number] [place] [address] 04 

Oct, 2013                                

 

                    [facility] [id number] [place] [address] 03 

Oct, 2013                                

 

                    [facility] [id number] [place] [address] 02 

Oct, 2013                                

 

                    [facility] [id number] [place] [address] 01 

Oct, 2013                                

 

                    [facility] [id number] [place] [address] 20 

Sep, 2013                                

 

                    [facility] [id number] [place] [address] 08 

Aug, 2013                                

 

                    [facility] [id number] [place] [address] 24 

May, 2013                                

 

                    [facility] [id number] [place] [address] 13 

May, 2013                                

 

                    [facility] [id number] [place] [address]                                 

 

                    [facility] [id number] [place] [address]                                 

 

                    [facility] [id number] [place] [address]                                 

 

                    [facility] [id number] [place] [address]                                 

 

                    [facility] [id number] [place] [address] 02 

Oct, 2012                                

 

                    [facility] [id number] [place] [address] 02 

Oct, 2012                                

 

                    [facility] [id number] [place] [address] 21 

Sep, 2012                                

 

                    [facility] [id number] [place] [address] 06 

Aug, 2012                                

 

                    [facility] [id number] [place] [address] 02 

Aug, 2012                                

 

                    [facility] [id number] [place] [address]                                 

 

                    [facility] [id number] [place] [address]                                 

 

                    [facility] [address] [address] 15 

Eusebio, 2012                                

 

                    [facility] [address] [address]                                 

 

                    [facility] [address] [address] 10 

Apr, 2012                                

 

                    [facility] [address] [address] 22 

Mar, 2012                                

 

                    [facility] [address] [address] 20 

Mar, 2012                                

 

                    [facility] [id number] [place] [address] 14 

Mar, 2012                                

 

                    [facility] [id number] [place] [address]                                 







IMMUNIZATIONS

No Known Immunizations



SOCIAL HISTORY

Never Assessed



REASON FOR VISIT





PLAN OF CARE





VITAL SIGNS





MEDICATIONS

Unknown Medications



RESULTS

No Results



PROCEDURES





                Procedure       Date Ordered    Result          Body Site

 

                URINE PREGNANCY TEST 2014                   

 

                Medroxyprogesterone inj 2014                   

 

                THER/PROPH/DIAG INJ, SC/IM 2014                   







INSTRUCTIONS





MEDICATIONS ADMINISTERED

No Known Medications



MEDICAL (GENERAL) HISTORY





                    Type                Description         Date

 

                          Medical History           allergic rhinitis- rec's edwige donahue allergy injections from Dr Rojas office                            

 

                    Medical History     mood disorder        

 

                    Medical History     Leukocytosis- has been to hematology  

 

                    Medical History     Hyperlipidemia       

 

                          Medical History           Left leg pain- multiple imag

ing performed and ortho referral 

placed                                   

 

                          Medical History           TUBULAR ADENOMA AND GASTRITI

S ON COLONOSOCPY AUG 2016 -MULTIPLE 

POLYPS                                   

 

                    Medical History     Helicobacter pylori (H. pylori) infectio

n Hx  

 

                    Surgical History    cholecystectomy     2006

 

                    Surgical History     section    , 

 

                    Surgical History    tonsillectomy       2015

 

                    Surgical History    colonoscopy         2016

 

                    Surgical History    colonscopy          2017

 

                    Surgical History    Right Knee scope    2017

## 2021-03-15 NOTE — LETTER
3/15/2021       RE: Stefania Austin  [address]  [facility] [address]     Referral source: Established patient     Chief complaint: Multiple sclerosis     History of the Present Illness: Ms. Stefania Austin is a 36 year-old woman who is evaluated in the Multiple Sclerosis Clinic today for follow up regarding her diagnosis of multiple sclerosis.    At the request of the patient, the appointment was conducted virtually to limit unnecessary exposure in light of the COVID-19 pandemic. The appointment was originally scheduled by video, but unfortunately I was unable to establish a functioning video link with the patient on either the [employer] or [employer] platforms, and accordingly the visit was conducted by telephone.    The patient's history is as per my previous notes.  Initial onset of symptoms of demyelinating disease was in 2015 when she had an episode of facial weakness. Several months after that she had diplopia and was found to have a sixth nerve palsy on examination.  MRI and CSF studies at that time were suggestive of demyelinating disease of the type seen in multiple sclerosis.  The patient was initially treated with glatiramer acetate, but had ongoing radiologic disease activity on that medication and was transitioned to the Plegridy formulation of interferon beta. However, she had further radiologic progression on that medication as well, and most recently began treatment with ocrelizumab in February 2019, with the most recent treatment in February of this year.    Today, she denies any new episodic changes in vision, balance, strength or sensation suggestive of new MS relapse since her last visit to this clinic.    Assessment/plan:    1. Multiple sclerosis  The patient is clinically stable with no evidence of active inflammatory demyelination on current disease modifying therapy with ocrelizumab. She will continue this medication, with the next infusion due in August (target date August 3,  2021).    Prior to the infusion, I will check routine laboratory studies for monitoring of this medication today to include blood counts, liver function tests and a total IgG level. I will check a CD19 count to make sure that she is maintaining adequate B cell depletion on an every six month infusion schedule. I will also obtain a vitamin D level and advise the patient on supplementation with vitamin D as needed to maintain her level within the goal range of 60-80 mcg/L.     I will see her back for a review in 6 months with MRI scans of the brain and cervical spine (ordered today) to be performed prior to the appointment to make sure that she is maintaining radiologic stability of her condition as well.    I spent a total of 14 minutes in patient care activities related to this encounter on the date of service, including 11 minutes on the phone with the patient and additional time spent in reviewing the chart and documentation in the electronic medical record.    Tico Peace MD   of Neurology  [facility]    Cc:  Porsha Veliz MD (PCP)  Patient

## 2021-03-15 NOTE — Clinical Note
3/15/2021         RE: Stefania Austin  [address]  [facility] [address]        Dear Colleague,    Thank you for referring your patient, Stefania Austin, to the [facility] [place]. Please see a copy of my visit note below.    Stefania is a 36 year old who is being evaluated via a billable telephone visit.      How would you like to obtain your AVS? MyChart  If the video visit is dropped, the invitation should be resent by: Text to cell phone: [phone]  Will anyone else be joining your video visit? No    Provider notes    Referral source: Established patient     Chief complaint: Multiple sclerosis     History of the Present Illness: Ms. Stefania Austin is a 36 year-old woman who is evaluated in the Multiple Sclerosis Clinic today for follow up regarding her diagnosis of multiple sclerosis.    At the request of the patient, the appointment was conducted virtually to limit unnecessary exposure in light of the COVID-19 pandemic. The appointment was originally scheduled by video, but unfortunately I was unable to establish a functioning video link with the patient on either the [employer] or [employer] platforms, and accordingly the visit was conducted by telephone.    The patient's history is as per my previous notes.  Initial onset of symptoms of demyelinating disease was in 2015 when she had an episode of facial weakness. Several months after that she had diplopia and was found to have a sixth nerve palsy on examination.  MRI and CSF studies at that time were suggestive of demyelinating disease of the type seen in multiple sclerosis.  The patient was initially treated with glatiramer acetate, but had ongoing radiologic disease activity on that medication and was transitioned to the Plegridy formulation of interferon beta. However, she had further radiologic progression on that medication as well, and most recently began treatment with ocrelizumab in February 2019, with  the most recent treatment in February of this year.    Today, she denies any new episodic changes in vision, balance, strength or sensation suggestive of new MS relapse since her last visit to this clinic.    Assessment/plan:    1. Multiple sclerosis  The patient is clinically stable with no evidence of active inflammatory demyelination on current disease modifying therapy with ocrelizumab. She will continue this medication, with the next infusion due in August (target date August 3, 2021).    Prior to the infusion, I will check routine laboratory studies for monitoring of this medication today to include blood counts, liver function tests and a total IgG level. I will check a CD19 count to make sure that she is maintaining adequate B cell depletion on an every six month infusion schedule. I will also obtain a vitamin D level and advise the patient on supplementation with vitamin D as needed to maintain her level within the goal range of 60-80 mcg/L.     I will see her back for a review in 6 months with MRI scans of the brain and cervical spine (ordered today) to be performed prior to the appointment to make sure that she is maintaining radiologic stability of her condition as well.    I spent a total of 14 minutes in patient care activities related to this encounter on the date of service, including 11 minutes on the phone with the patient and additional time spent in reviewing the chart and documentation in the electronic medical record.      Telephone Visit Details    Type of service: Telephone Visit    Duration of telephone call: 11 minutes    Distant Location (provider location):  [facility] [place]         Again, thank you for allowing me to participate in the care of your patient.        Sincerely,        Tico Peace MD

## 2021-03-15 NOTE — PROGRESS NOTES
Stefania is a 36 year old who is being evaluated via a billable telephone visit.      How would you like to obtain your AVS? MyChart  If the video visit is dropped, the invitation should be resent by: Text to cell phone: [phone]  Will anyone else be joining your video visit? No    Provider notes    Referral source: Established patient     Chief complaint: Multiple sclerosis     History of the Present Illness: Ms. Stefania Austin is a 36 year-old woman who is evaluated in the Multiple Sclerosis Clinic today for follow up regarding her diagnosis of multiple sclerosis.    At the request of the patient, the appointment was conducted virtually to limit unnecessary exposure in light of the COVID-19 pandemic. The appointment was originally scheduled by video, but unfortunately I was unable to establish a functioning video link with the patient on either the [employer] or [employer] platforms, and accordingly the visit was conducted by telephone.    The patient's history is as per my previous notes.  Initial onset of symptoms of demyelinating disease was in 2015 when she had an episode of facial weakness. Several months after that she had diplopia and was found to have a sixth nerve palsy on examination.  MRI and CSF studies at that time were suggestive of demyelinating disease of the type seen in multiple sclerosis.  The patient was initially treated with glatiramer acetate, but had ongoing radiologic disease activity on that medication and was transitioned to the Plegridy formulation of interferon beta. However, she had further radiologic progression on that medication as well, and most recently began treatment with ocrelizumab in February 2019, with the most recent treatment in February of this year.    Today, she denies any new episodic changes in vision, balance, strength or sensation suggestive of new MS relapse since her last visit to this clinic.    Assessment/plan:    1. Multiple sclerosis  The patient is clinically  stable with no evidence of active inflammatory demyelination on current disease modifying therapy with ocrelizumab. She will continue this medication, with the next infusion due in August (target date August 3, 2021).    Prior to the infusion, I will check routine laboratory studies for monitoring of this medication today to include blood counts, liver function tests and a total IgG level. I will check a CD19 count to make sure that she is maintaining adequate B cell depletion on an every six month infusion schedule. I will also obtain a vitamin D level and advise the patient on supplementation with vitamin D as needed to maintain her level within the goal range of 60-80 mcg/L.     I will see her back for a review in 6 months with MRI scans of the brain and cervical spine (ordered today) to be performed prior to the appointment to make sure that she is maintaining radiologic stability of her condition as well.    I spent a total of 14 minutes in patient care activities related to this encounter on the date of service, including 11 minutes on the phone with the patient and additional time spent in reviewing the chart and documentation in the electronic medical record.      Telephone Visit Details    Type of service: Telephone Visit    Duration of telephone call: 11 minutes    Distant Location (provider location):  [facility] [place]

## 2021-04-08 ENCOUNTER — VIRTUAL VISIT (OUTPATIENT)
Dept: FAMILY MEDICINE | Facility: CLINIC | Age: 36
End: 2021-04-08
Payer: COMMERCIAL

## 2021-04-08 DIAGNOSIS — Z20.822 EXPOSURE TO COVID-19 VIRUS: ICD-10-CM

## 2021-04-08 DIAGNOSIS — T78.1XXA PFAS (POLLEN-FOOD ALLERGY SYNDROME), INITIAL ENCOUNTER: ICD-10-CM

## 2021-04-08 DIAGNOSIS — J30.89 ALLERGIC RHINITIS DUE TO MOLD: ICD-10-CM

## 2021-04-08 DIAGNOSIS — H10.13 ALLERGIC CONJUNCTIVITIS, BILATERAL: ICD-10-CM

## 2021-04-08 DIAGNOSIS — Z20.822 EXPOSURE TO COVID-19 VIRUS: Primary | ICD-10-CM

## 2021-04-08 DIAGNOSIS — J30.1 SEASONAL ALLERGIC RHINITIS DUE TO POLLEN: ICD-10-CM

## 2021-04-08 PROCEDURE — U0005 INFEC AGEN DETEC AMPLI PROBE: HCPCS | Performed by: NURSE PRACTITIONER

## 2021-04-08 PROCEDURE — 99213 OFFICE O/P EST LOW 20 MIN: CPT | Mod: 95 | Performed by: NURSE PRACTITIONER

## 2021-04-08 PROCEDURE — U0003 INFECTIOUS AGENT DETECTION BY NUCLEIC ACID (DNA OR RNA); SEVERE ACUTE RESPIRATORY SYNDROME CORONAVIRUS 2 (SARS-COV-2) (CORONAVIRUS DISEASE [COVID-19]), AMPLIFIED PROBE TECHNIQUE, MAKING USE OF HIGH THROUGHPUT TECHNOLOGIES AS DESCRIBED BY CMS-2020-01-R: HCPCS | Performed by: NURSE PRACTITIONER

## 2021-04-08 RX ORDER — OLOPATADINE HYDROCHLORIDE 1 MG/ML
1 SOLUTION/ DROPS OPHTHALMIC 2 TIMES DAILY
Qty: 5 ML | Refills: 1 | Status: SHIPPED | OUTPATIENT
Start: 2021-04-08 | End: 2022-05-06

## 2021-04-08 RX ORDER — CETIRIZINE HYDROCHLORIDE 10 MG/1
10 TABLET ORAL DAILY
Qty: 30 TABLET | Refills: 11 | Status: SHIPPED | OUTPATIENT
Start: 2021-04-08 | End: 2024-06-12

## 2021-04-08 NOTE — LETTER
April 8, 2021      Stefania Austin  [address]   [facility] [address]        To Whom It May Concern:    Stefania Austin  was seen on 4/8/21.  Please excuse her from work due to Covid-19 exposure per [facility] guidelines for your facility.  Covid-19 PCR testing was ordered on 4/8/21.      Sincerely,        SOURAV Rosado CNP

## 2021-04-08 NOTE — PROGRESS NOTES
Stefania is a 36 year old who is being evaluated via a billable video visit.      How would you like to obtain your AVS? MyChart  If the video visit is dropped, the invitation should be resent by: Text to cell phone: [phone]  Will anyone else be joining your video visit? No    Video Start Time: 9:53 AM    Assessment & Plan     Seasonal allergic rhinitis due to pollen    - cetirizine (ZYRTEC) 10 MG tablet; Take 1 tablet (10 mg) by mouth daily    Allergic rhinitis due to mold    - cetirizine (ZYRTEC) 10 MG tablet; Take 1 tablet (10 mg) by mouth daily    Allergic conjunctivitis, bilateral  Patient to add patanol.  She should resume cetirizine and can increase to twice daily if needed.  - olopatadine (PATANOL) 0.1 % ophthalmic solution; Place 1 drop into both eyes 2 times daily  - cetirizine (ZYRTEC) 10 MG tablet; Take 1 tablet (10 mg) by mouth daily    PFAS (pollen-food allergy syndrome), initial encounter    - cetirizine (ZYRTEC) 10 MG tablet; Take 1 tablet (10 mg) by mouth daily    Exposure to COVID-19 virus  Patient works in a group home.  She will quarantine per [facility] guidelines set by her employer.  - Asymptomatic COVID-19 Virus (Coronavirus) by PCR; Future    Ordering of each unique test  Prescription drug management             No follow-ups on file.    SOURAV Rosado CNP  [facility]    Godwin Aguiar is a 36 year old who presents for the following health issues     HPI     Allergies  Onset/Duration: last weekend  Symptoms:   Nasal congestion: no  Sneezing: YES  Red, itchy eyes: YES, eyes are really watery  Progression of Symptoms: same worse  Accompanying Signs & Symptoms:  Cough: no  Wheezing: no  Rash: no  Sinus/facial pain: no  History:   Is it seasonal: in the spring   History of Asthma: no  Has allergy testing been done: YES  Precipitating factors:   none  Alleviating factors:  None  Therapies tried and outcome: OTC eye drops, no relief    Covid exposure 4/5/2021.   She was masked, but was in close proximity to Covid positive person.  She works in a group home.  She denies symptoms and notes that she had previous Covid-19 infection.    Review of Systems   Constitutional, HEENT, cardiovascular, pulmonary, gi and gu systems are negative, except as otherwise noted.      Objective           Vitals:  No vitals were obtained today due to virtual visit.    Physical Exam   GENERAL: Healthy, alert and no distress  EYES: Eyes grossly normal to inspection.  No discharge or erythema, or obvious scleral/conjunctival abnormalities.  RESP: No audible wheeze, cough, or visible cyanosis.  No visible retractions or increased work of breathing.    SKIN: Visible skin clear. No significant rash, abnormal pigmentation or lesions.  NEURO: Cranial nerves grossly intact.  Mentation and speech appropriate for age.  PSYCH: Mentation appears normal, affect normal/bright, judgement and insight intact, normal speech and appearance well-groomed.                Video-Visit Details    Type of service:  Video Visit    Video End Time:10:06 AM    Originating Location (pt. Location): Home    Distant Location (provider location):  [facility]     Platform used for Video Visit: [employer]

## 2021-04-09 LAB
SARS-COV-2 RNA RESP QL NAA+PROBE: NOT DETECTED
SPECIMEN SOURCE: NORMAL

## 2021-04-12 NOTE — RESULT ENCOUNTER NOTE
Dear Stefania,    Your recent test results are attached.      No Covid-19 detected.    If you have any questions please feel free to contact [phone] or myself via [employer].    Sincerely,  Tina Drew, CNP

## 2021-07-05 ENCOUNTER — TELEPHONE (OUTPATIENT)
Dept: NEUROLOGY | Facility: CLINIC | Age: 36
End: 2021-07-05

## 2021-07-05 NOTE — TELEPHONE ENCOUNTER
Pt reminded to schedule her next Ocrevus infusion for August 3rd. Pt verbalized understanding and given number to call infusion center to schedule.       Mimi Trinidad RNCC  Neurology

## 2021-07-19 NOTE — TELEPHONE ENCOUNTER
Second reminder made to pt to schedule next Ocrevus infusion. Target date of 8/3/21. Pt verbalized understanding and has the number to call [facility] infusion center to schedule appt.       Mimi Trinidad, RNCC  Neurology

## 2021-08-12 ENCOUNTER — INFUSION THERAPY VISIT (OUTPATIENT)
Dept: INFUSION THERAPY | Facility: CLINIC | Age: 36
End: 2021-08-12
Payer: COMMERCIAL

## 2021-08-12 VITALS
OXYGEN SATURATION: 98 % | DIASTOLIC BLOOD PRESSURE: 74 MMHG | HEART RATE: 72 BPM | WEIGHT: 287.1 LBS | BODY MASS INDEX: 41.19 KG/M2 | SYSTOLIC BLOOD PRESSURE: 120 MMHG | TEMPERATURE: 98.4 F | RESPIRATION RATE: 16 BRPM

## 2021-08-12 DIAGNOSIS — G35 MS (MULTIPLE SCLEROSIS) (H): Primary | ICD-10-CM

## 2021-08-12 PROCEDURE — 99207 PR NO CHARGE LOS: CPT

## 2021-08-12 PROCEDURE — 96413 CHEMO IV INFUSION 1 HR: CPT | Performed by: NURSE PRACTITIONER

## 2021-08-12 PROCEDURE — 96375 TX/PRO/DX INJ NEW DRUG ADDON: CPT | Performed by: NURSE PRACTITIONER

## 2021-08-12 PROCEDURE — 96415 CHEMO IV INFUSION ADDL HR: CPT | Performed by: NURSE PRACTITIONER

## 2021-08-12 RX ORDER — DIPHENHYDRAMINE HCL 25 MG
50 CAPSULE ORAL ONCE
Status: CANCELLED | OUTPATIENT
Start: 2022-01-29

## 2021-08-12 RX ORDER — METHYLPREDNISOLONE SODIUM SUCCINATE 125 MG/2ML
125 INJECTION, POWDER, LYOPHILIZED, FOR SOLUTION INTRAMUSCULAR; INTRAVENOUS ONCE
Status: CANCELLED | OUTPATIENT
Start: 2022-01-29

## 2021-08-12 RX ORDER — METHYLPREDNISOLONE SODIUM SUCCINATE 125 MG/2ML
125 INJECTION, POWDER, LYOPHILIZED, FOR SOLUTION INTRAMUSCULAR; INTRAVENOUS ONCE
Status: COMPLETED | OUTPATIENT
Start: 2021-08-12 | End: 2021-08-12

## 2021-08-12 RX ORDER — ACETAMINOPHEN 325 MG/1
650 TABLET ORAL ONCE
Status: COMPLETED | OUTPATIENT
Start: 2021-08-12 | End: 2021-08-12

## 2021-08-12 RX ORDER — ACETAMINOPHEN 325 MG/1
650 TABLET ORAL ONCE
Status: CANCELLED | OUTPATIENT
Start: 2022-01-29

## 2021-08-12 RX ORDER — HEPARIN SODIUM,PORCINE 10 UNIT/ML
5 VIAL (ML) INTRAVENOUS
Status: CANCELLED | OUTPATIENT
Start: 2022-01-29

## 2021-08-12 RX ORDER — DIPHENHYDRAMINE HYDROCHLORIDE 50 MG/ML
50 INJECTION INTRAMUSCULAR; INTRAVENOUS
Status: COMPLETED | OUTPATIENT
Start: 2021-08-12 | End: 2021-08-12

## 2021-08-12 RX ORDER — HEPARIN SODIUM (PORCINE) LOCK FLUSH IV SOLN 100 UNIT/ML 100 UNIT/ML
5 SOLUTION INTRAVENOUS
Status: CANCELLED | OUTPATIENT
Start: 2022-01-29

## 2021-08-12 RX ORDER — DIPHENHYDRAMINE HCL 25 MG
50 CAPSULE ORAL ONCE
Status: COMPLETED | OUTPATIENT
Start: 2021-08-12 | End: 2021-08-12

## 2021-08-12 RX ORDER — METHYLPREDNISOLONE SODIUM SUCCINATE 125 MG/2ML
125 INJECTION, POWDER, LYOPHILIZED, FOR SOLUTION INTRAMUSCULAR; INTRAVENOUS
Status: DISCONTINUED | OUTPATIENT
Start: 2021-08-12 | End: 2021-08-12 | Stop reason: HOSPADM

## 2021-08-12 RX ADMIN — ACETAMINOPHEN 650 MG: 325 TABLET ORAL at 11:02

## 2021-08-12 RX ADMIN — DIPHENHYDRAMINE HYDROCHLORIDE 50 MG: 50 INJECTION INTRAMUSCULAR; INTRAVENOUS at 12:20

## 2021-08-12 RX ADMIN — Medication 50 MG: at 11:02

## 2021-08-12 RX ADMIN — Medication 250 ML: at 11:18

## 2021-08-12 RX ADMIN — METHYLPREDNISOLONE SODIUM SUCCINATE 125 MG: 125 INJECTION INTRAMUSCULAR; INTRAVENOUS at 11:13

## 2021-08-12 ASSESSMENT — PAIN SCALES - GENERAL: PAINLEVEL: NO PAIN (0)

## 2021-08-12 NOTE — PROGRESS NOTES
Infusion Nursing Note:  Stefaniaseveriano Austin presents today for [facility].    Patient seen by provider today: No   present during visit today: Not Applicable.    Note: N/A.  Patient did meet criteria for an asymptomatic covid-19 PCR test in infusion today. Patient declined the covid-19 test.    Intravenous Access:  Peripheral IV placed.    Treatment Conditions:  Biological Infusion Checklist:  ~~~ NOTE: If the patient answers yes to any of the questions below, hold the infusion and contact ordering provider or on-call provider.    1. Have you recently had an elevated temperature, fever, chills, productive cough, coughing for 3 weeks or longer or hemoptysis, abnormal vital signs, night sweats,  chest pain or have you noticed a decrease in your appetite, unexplained weight loss or fatigue? No  2. Do you have any open wounds or new incisions? No  3. Do you have any recent or upcoming hospitalizations, surgeries or dental procedures? No  4. Do you currently have or recently have had any signs of illness or infection or are you on any antibiotics? No  5. Have you had any new, sudden or worsening abdominal pain? No  6. Have you or anyone in your household received a live vaccination in the past 4 weeks? Please note:  No live vaccines while on biologic/chemotherapy until 6 months after the last treatment.  Patient can receive the flu vaccine (shot only) and the pneumovax.  It is optimal for the patient to get these vaccines mid cycle, but they can be given at any time as long as it is not on the day of the infusion. No  7. Have you recently been diagnosed with any new nervous system diseases (ie. Multiple sclerosis, Guillain [place], seizures, neurological changes) or cancer diagnosis? No  8. Are you on any form of radiation or chemotherapy? No  9. Are you pregnant or breast feeding or do you have plans of pregnancy in the future? No  10. Have you been having any signs of worsening depression or suicidal ideations?   (benlysta only) No  11. Have there been any other new onset medical symptoms? No      Post Infusion Assessment:  Patient tolerated infusion poorly due to : Hypersensitivity: Did patient have a hypersensitivity reaction? : Yes  Drug or Product name: Ocrevus  Were pre-meds administered?: Yes  What pre-meds were administered?: Acetaminophen (Tylenol);Diphenhydramine (Benadryl);Methylprednisolone  First or Subsequent treatment: Subsequent infusion  Rate of infusion when patient had hypersensitivity reaction: 250 mL/hr  Time the hypersensitivity reaction was first recognized: 1216  Symptoms observed or reported (select all that apply): Other: (Comment) (Scratchy throat)  Interventions/treatment following reaction: Infusion stopped;Hypersensitivity medications administered;Reduced rate of medication administration;Additional observation period added  What hypersensitivity medications were administered?: DiphendydrAMINE (benadryl)           Was the patient re-challenged today?: Yes - tolerated well  Patient observed for 60 minutes post Ocrevus per protocol.  Blood return noted pre and post infusion.  Site patent and intact, free from redness, edema or discomfort.  No evidence of extravasations.  Access discontinued per protocol.       Discharge Plan:   Patient will return in 6 months for next appointment.   Patient discharged in stable condition accompanied by: self.  Departure Mode: Ambulatory.    Cassidy Thomas RN-BSN, PHN, OCN  [facility]

## 2021-08-12 NOTE — PROGRESS NOTES
1216: reported a scratchy throat.  Been a little while, thought it would go away.    Ocrevus stopped, NS started.   VS:   98.7  136 81   67   97%    IVP benadryl.  Patient felt very dizzy and became tearful.  Did not like the side effects of benadryl, but scratchy throat went away within 5 minutes of benadryl administration.    1226: itching resolved.  Saline given for 30 minutes. Ocrevus was resumed per package instructions and was tolerated for the rest of the infusion.

## 2021-09-28 ENCOUNTER — ANCILLARY PROCEDURE (OUTPATIENT)
Dept: MRI IMAGING | Facility: CLINIC | Age: 36
End: 2021-09-28
Attending: PSYCHIATRY & NEUROLOGY
Payer: COMMERCIAL

## 2021-09-28 DIAGNOSIS — G35 MS (MULTIPLE SCLEROSIS) (H): ICD-10-CM

## 2021-09-28 PROCEDURE — 70553 MRI BRAIN STEM W/O & W/DYE: CPT | Performed by: RADIOLOGY

## 2021-09-28 PROCEDURE — 72156 MRI NECK SPINE W/O & W/DYE: CPT | Performed by: RADIOLOGY

## 2021-09-28 PROCEDURE — A9585 GADOBUTROL INJECTION: HCPCS | Performed by: RADIOLOGY

## 2021-09-28 RX ORDER — GADOBUTROL 604.72 MG/ML
15 INJECTION INTRAVENOUS ONCE
Status: COMPLETED | OUTPATIENT
Start: 2021-09-28 | End: 2021-09-28

## 2021-09-28 RX ADMIN — GADOBUTROL 13 ML: 604.72 INJECTION INTRAVENOUS at 16:53

## 2021-09-28 NOTE — DISCHARGE INSTRUCTIONS
MRI Contrast Discharge Instructions    The IV contrast you received today will pass out of your body in your  urine. This will happen in the next 24 hours. You will not feel this process.  Your urine will not change color.    Drink at least 4 extra glasses of water or juice today (unless your doctor  has restricted your fluids). This reduces the stress on your kidneys.  You may take your regular medicines.    If you are on dialysis: It is best to have dialysis today.    If you have a reaction: Most reactions happen right away. If you have  any new symptoms after leaving the hospital (such as hives or swelling),  call your hospital at the correct number below. Or call your family doctor.  If you have breathing distress or wheezing, call 911.    Special instructions: ***    I have read and understand the above information.    Signature:______________________________________ Date:___________    Staff:__________________________________________ Date:___________     Time:__________    [facility] Departments:    ___[place]: [phone]  ___[facility]: [phone]  ___[place]: [phone] ___[facility]: [phone]  ___[facility]: [phone]  ___[facility]: [phone]  ___[phone]  ___[facility]: [phone]  ___[phone]

## 2021-09-28 NOTE — DISCHARGE INSTRUCTIONS
MRI Contrast Discharge Instructions    The IV contrast you received today will pass out of your body in your  urine. This will happen in the next 24 hours. You will not feel this process.  Your urine will not change color.    Drink at least 4 extra glasses of water or juice today (unless your doctor  has restricted your fluids). This reduces the stress on your kidneys.  You may take your regular medicines.    If you are on dialysis: It is best to have dialysis today.    If you have a reaction: Most reactions happen right away. If you have  any new symptoms after leaving the hospital (such as hives or swelling),  call your hospital at the correct number below. Or call your family doctor.  If you have breathing distress or wheezing, call 911.    Special instructions: ***    I have read and understand the above information.    Signature:______________________________________ Date:___________    Staff:__________________________________________ Date:___________     Time:__________    [facility] Departments:    ___[place]: [phone]  ___[facility]: [phone]  ___[place]: [phone] ___[facility]: [phone]  ___[facility]: [phone]  ___[facility]: [phone]  ___[phone]  ___[facility]: [phone]  ___[phone]

## 2021-11-01 ENCOUNTER — TELEPHONE (OUTPATIENT)
Dept: NEUROLOGY | Facility: CLINIC | Age: 36
End: 2021-11-01

## 2021-11-01 ENCOUNTER — OFFICE VISIT (OUTPATIENT)
Dept: NEUROLOGY | Facility: CLINIC | Age: 36
End: 2021-11-01
Payer: COMMERCIAL

## 2021-11-01 VITALS
WEIGHT: 286.1 LBS | DIASTOLIC BLOOD PRESSURE: 85 MMHG | SYSTOLIC BLOOD PRESSURE: 122 MMHG | HEART RATE: 74 BPM | BODY MASS INDEX: 41.05 KG/M2

## 2021-11-01 DIAGNOSIS — G35 MS (MULTIPLE SCLEROSIS) (H): ICD-10-CM

## 2021-11-01 DIAGNOSIS — G35 MS (MULTIPLE SCLEROSIS) (H): Primary | ICD-10-CM

## 2021-11-01 LAB
ALBUMIN SERPL-MCNC: 3.4 G/DL (ref 3.4–5)
ALP SERPL-CCNC: 86 U/L (ref 40–150)
ALT SERPL W P-5'-P-CCNC: 17 U/L (ref 0–50)
AST SERPL W P-5'-P-CCNC: 17 U/L (ref 0–45)
BASOPHILS # BLD AUTO: 0 10E3/UL (ref 0–0.2)
BASOPHILS NFR BLD AUTO: 0 %
BILIRUB DIRECT SERPL-MCNC: <0.1 MG/DL (ref 0–0.2)
BILIRUB SERPL-MCNC: 0.3 MG/DL (ref 0.2–1.3)
DEPRECATED CALCIDIOL+CALCIFEROL SERPL-MC: 29 UG/L (ref 20–75)
EOSINOPHIL # BLD AUTO: 0.1 10E3/UL (ref 0–0.7)
EOSINOPHIL NFR BLD AUTO: 1 %
ERYTHROCYTE [DISTWIDTH] IN BLOOD BY AUTOMATED COUNT: 15.6 % (ref 10–15)
HCT VFR BLD AUTO: 35.1 % (ref 35–47)
HGB BLD-MCNC: 11.3 G/DL (ref 11.7–15.7)
IGG SERPL-MCNC: 1288 MG/DL (ref 610–1616)
IMM GRANULOCYTES # BLD: 0 10E3/UL
IMM GRANULOCYTES NFR BLD: 0 %
LYMPHOCYTES # BLD AUTO: 1.8 10E3/UL (ref 0.8–5.3)
LYMPHOCYTES NFR BLD AUTO: 33 %
MCH RBC QN AUTO: 22.4 PG (ref 26.5–33)
MCHC RBC AUTO-ENTMCNC: 32.2 G/DL (ref 31.5–36.5)
MCV RBC AUTO: 70 FL (ref 78–100)
MONOCYTES # BLD AUTO: 0.5 10E3/UL (ref 0–1.3)
MONOCYTES NFR BLD AUTO: 10 %
NEUTROPHILS # BLD AUTO: 3.1 10E3/UL (ref 1.6–8.3)
NEUTROPHILS NFR BLD AUTO: 56 %
NRBC # BLD AUTO: 0 10E3/UL
NRBC BLD AUTO-RTO: 0 /100
PLATELET # BLD AUTO: 298 10E3/UL (ref 150–450)
PROT SERPL-MCNC: 7.3 G/DL (ref 6.8–8.8)
RBC # BLD AUTO: 5.04 10E6/UL (ref 3.8–5.2)
WBC # BLD AUTO: 5.5 10E3/UL (ref 4–11)

## 2021-11-01 PROCEDURE — 85025 COMPLETE CBC W/AUTO DIFF WBC: CPT | Performed by: PSYCHIATRY & NEUROLOGY

## 2021-11-01 PROCEDURE — 82306 VITAMIN D 25 HYDROXY: CPT | Performed by: PSYCHIATRY & NEUROLOGY

## 2021-11-01 PROCEDURE — 99213 OFFICE O/P EST LOW 20 MIN: CPT | Performed by: PSYCHIATRY & NEUROLOGY

## 2021-11-01 PROCEDURE — 80076 HEPATIC FUNCTION PANEL: CPT | Performed by: PSYCHIATRY & NEUROLOGY

## 2021-11-01 PROCEDURE — 36415 COLL VENOUS BLD VENIPUNCTURE: CPT | Performed by: PSYCHIATRY & NEUROLOGY

## 2021-11-01 PROCEDURE — 82784 ASSAY IGA/IGD/IGG/IGM EACH: CPT | Performed by: PSYCHIATRY & NEUROLOGY

## 2021-11-01 RX ORDER — HEPARIN SODIUM,PORCINE 10 UNIT/ML
5 VIAL (ML) INTRAVENOUS
Status: CANCELLED | OUTPATIENT
Start: 2021-11-01

## 2021-11-01 RX ORDER — ALBUTEROL SULFATE 0.83 MG/ML
2.5 SOLUTION RESPIRATORY (INHALATION)
Status: CANCELLED | OUTPATIENT
Start: 2021-11-01

## 2021-11-01 RX ORDER — AMOXICILLIN 500 MG/1
CAPSULE ORAL
COMMUNITY
Start: 2021-10-26 | End: 2022-05-06

## 2021-11-01 RX ORDER — MEPERIDINE HYDROCHLORIDE 25 MG/ML
25 INJECTION INTRAMUSCULAR; INTRAVENOUS; SUBCUTANEOUS EVERY 30 MIN PRN
Status: CANCELLED | OUTPATIENT
Start: 2021-11-01

## 2021-11-01 RX ORDER — METHYLPREDNISOLONE SODIUM SUCCINATE 125 MG/2ML
125 INJECTION, POWDER, LYOPHILIZED, FOR SOLUTION INTRAMUSCULAR; INTRAVENOUS ONCE
Status: CANCELLED | OUTPATIENT
Start: 2021-11-01

## 2021-11-01 RX ORDER — ALBUTEROL SULFATE 90 UG/1
1-2 AEROSOL, METERED RESPIRATORY (INHALATION)
Status: CANCELLED
Start: 2021-11-01

## 2021-11-01 RX ORDER — DIPHENHYDRAMINE HCL 25 MG
50 CAPSULE ORAL ONCE
Status: CANCELLED | OUTPATIENT
Start: 2021-11-01

## 2021-11-01 RX ORDER — HEPARIN SODIUM (PORCINE) LOCK FLUSH IV SOLN 100 UNIT/ML 100 UNIT/ML
5 SOLUTION INTRAVENOUS
Status: CANCELLED | OUTPATIENT
Start: 2021-11-01

## 2021-11-01 RX ORDER — NALOXONE HYDROCHLORIDE 0.4 MG/ML
0.2 INJECTION, SOLUTION INTRAMUSCULAR; INTRAVENOUS; SUBCUTANEOUS
Status: CANCELLED | OUTPATIENT
Start: 2021-11-01

## 2021-11-01 RX ORDER — EPINEPHRINE 1 MG/ML
0.3 INJECTION, SOLUTION INTRAMUSCULAR; SUBCUTANEOUS EVERY 5 MIN PRN
Status: CANCELLED | OUTPATIENT
Start: 2021-11-01

## 2021-11-01 RX ORDER — ACETAMINOPHEN 325 MG/1
650 TABLET ORAL ONCE
Status: CANCELLED | OUTPATIENT
Start: 2021-11-01

## 2021-11-01 RX ORDER — DIPHENHYDRAMINE HYDROCHLORIDE 50 MG/ML
50 INJECTION INTRAMUSCULAR; INTRAVENOUS
Status: CANCELLED
Start: 2021-11-01

## 2021-11-01 RX ORDER — METHYLPREDNISOLONE SODIUM SUCCINATE 125 MG/2ML
125 INJECTION, POWDER, LYOPHILIZED, FOR SOLUTION INTRAMUSCULAR; INTRAVENOUS
Status: CANCELLED
Start: 2021-11-01

## 2021-11-01 ASSESSMENT — PAIN SCALES - GENERAL: PAINLEVEL: NO PAIN (0)

## 2021-11-01 NOTE — LETTER
11/1/2021      RE: Stefania Austni  [address]  [facility] [place] [address]     Referral source: Established patient    Chief complaint: Multiple sclerosis    Assisted by: Luz Gao MD, PhD, PGY-4 Neurology resident    History of the Present Illness:  Ms. Stefania Austin is a 36-year-old woman who returns to the Multiple Sclerosis Clinic today for a scheduled follow-up visit regarding her diagnosis of multiple sclerosis.  I was assisted in this evaluation today by Dr. Gao, and her documentation should be considered integral to this note.    The patient's history is as per my previous notes.  She initially developed symptoms of demyelinating disease in 2015, when she had an episode of facial weakness.  Several months after that she developed diplopia and was found to have a sixth nerve palsy on examination.  MRI and CSF studies done at that time were consistent with multiple sclerosis.  The patient was placed on disease modifying therapy with glatiramer acetate, but had ongoing radiologic disease activity on that medication, leading to transition to the Plegridy formulation of interferon beta.  She continued to have radiologic progression on Plegridy and then was transitioned to ocrelizumab in February 2019.    Today, the patient denies any new episodic changes in vision, balance, strength or sensation suggestive of new relapse of multiple sclerosis since she was last seen.  She had some difficulty with the last ocrelizumab infusion, which was performed using the rapid infusion protocol.  She developed a scratchy throat and was treated with IV diphenhydramine, but did not like the way that this made her feel.  She is questioning whether she needs to be on medication at all.    Physical examination is as per Dr. Gao, and includes essentially non-focal neurologic examination.    Investigations: I reviewed the results of MRI scans of the brain and cervical spine performed on 09/28/2021. There was no  abnormal enhancement with gadolinium contrast in the brain and visualized cord parenchyma.  Multiple periventricular and juxtacortical foci of T2 hyperintensity in the white matter of the cerebral hemispheres bilaterally were unchanged from previous MRI of 03/02/2020, and no new lesions were seen.    Assessment/plan:    1. Multiple sclerosis  The patient remains clinically and radiologically stable on disease modifying therapy with ocrelizumab.  I counseled her strongly against withdrawing from disease modifying therapy altogether.  I told her that there is an approximately 90% chance of recurrent clinical disease activity if she does not continue disease modifying therapy.  I did emphasize to her that I think that she has been stable over the past 2-1/2 years because of the medication that she is on, and not because this is the natural history of her condition.      I reviewed oral medication options for multiple sclerosis, including methyl fumarate compounds, teriflunomide and fingolimod with her.  Regarding the latter, this is more efficacious than platform injectable drugs, although likely not quite as effective as ocrelizumab.  In addition, this medication carries risks of side effects including cardiac arrhythmias, macular edema of the retina, increased risk of basal cell carcinoma of the skin, and risk of opportunistic infections of the brain.  I told her that I am not convinced that this is a safer option than ocrelizumab.  Regarding the other options mentioned, these are generally in a similar tier of efficacy as the platform injectable drugs. If she was unwilling to consider other options, I would be in favor of her being on one of these treatments rather than nothing, but this would not be my recommendation for her either.    I suggested to her that she continue ocrelizumab with the standard infusion protocol and after discussion, she did indicate willingness to proceed with this.  Accordingly, she will  get the next ocrelizumab infusion, with the target date for the next infusion being 02/12/2022.    Today, I will obtain routine laboratory studies for monitoring of this medication to include complete blood counts with differential, hepatic panel and IgG level.  I will also check a vitamin D level and advise her on supplementation with vitamin D as needed to maintain her level within the goal range of 60-80 mcg per liter.  Currently, she is not on any vitamin D.    Tico Peace MD   of Neurology  [facility]    Cc:  Porsha Veliz MD (PCP)  Patient

## 2021-11-01 NOTE — Clinical Note
2021         RE: Stefania Austin  [address]  [place] [address]        Dear Colleague,    Thank you for referring your patient, Stefania Austin, to the [facility] [place]. Please see a copy of my visit note below.    [facility] OUTPATIENT [facility]IC VISIT NOTE      Name of the patient: Stefania Austin  MRN: [id number]  : 1985  Date of visit: 21    Date of Onset: ***  Date of Diagnosis: ***  Initial Clinical Course: ***  Current Clinical Course: ***  Past Disease Modifying Therapy(ies): ***  Current Disease Modifying Therapy(ies):  Most Recent MRI of the Brain: ***  Most Recent MRI of the Cervical Cord ***  Most Recent MRI of the Thoracic Cord: ***  Most Recent Lumbar Puncture: ***  Most Recent OCT: ***  Most Recent JCV: ***  Most Recent Remote Hepatitis Panel: ***  Most Recent VZV IgG: ***   Most Recent TB Quant: ***      REASON FOR VISIT: *** is a *** year old female who returns to the clinic today for regularly scheduled follow-up of her diagnosis of *** relapsing-remitting multiple sclerosis.  She was most recently seen by *** in ***.    HISTORY OF PRESENT ILLNESS: ***    INTERVAL HISTORY SINCE LAST VISIT: Overall doing well***. No recent hospitalization or illness. She denies any new changes in vision, balance, strength or sensation suggestive of new relapse of multiple sclerosis since she was last seen here. She is currently on ***. Injections are going well. *** any side effects from medication. Any abnormal labs on drug monitoring.***  Symptomatically, he continues to have ***. Medications using include***  Issues with balance:***  Able to climb stairs: ***  Any recent falls:***  Need for assistive devices:***    PAST MEDICAL/SURGICAL HISTORY: ***    FAMILY HISTORY: ***    SOCIAL HISTORY: ***    PHYSICAL EXAMINATION:   VITAL SIGNS: @  MENTAL STATUS: Alert,awake.  No word-finding difficulty.  CRANIAL NERVES: Visual fields are  full to confrontation, no red desaturation. The pupils are round and react to light. Funduscopic examination deferred due to need for faceshield***. Extraocular movements are intact with no internuclear ophthalmoplegia. No nystagmus. Facial strength and sensation are normal. Hearing is normal. Palate elevation and tongue protrusion are normal.   MOTOR: No abnormal movements noted.  No pronator drift.  Normal muscle bulk. Tone is within normal limits in the limbs.  Strength is normal (5/5) in the following muscles/groups: Deltoids, biceps, triceps, wrist extensors, finger abductors,  hip flexors, knee flexors, foot dorsiflexors.    SENSORY: intact/symmetric to light touch, pinprick, vibration throughout.  Romberg negative.  REFLEXES: Reflexes are 2+, present and symmetric at biceps, triceps, brachioradialis, knees and ankles. Babinski downgoing.  CEREBELLAR:  There is no appendicular ataxia on finger-to-nose testing.  GAIT: Gait is narrow-based and steady and the patient is able to walk on heels, toes and in tandem without difficulty.     LABORATORY STUDIES:  ***    IMAGING:  ***    ASSESSMENT:  ***    PLAN:  - ***    Patient to follow up in 6 month(s) as long as the patient is doing well.    Luz Gao MD  PGY-4 Neurology  11/01/2021          Again, thank you for allowing me to participate in the care of your patient.        Sincerely,        Tico Peace MD

## 2021-11-01 NOTE — PROGRESS NOTES
Date of service: November 1, 2021    Referral source: Established patient    Chief complaint: Multiple sclerosis    Assisted by: Luz Gao MD, PhD, PGY-4 Neurology resident    History of the Present Illness:  Ms. Stefania Austin is a 36-year-old woman who returns to the Multiple Sclerosis Clinic today for a scheduled follow-up visit regarding her diagnosis of multiple sclerosis.  I was assisted in this evaluation today by Dr. Gao, and her documentation should be considered integral to this note.    The patient's history is as per my previous notes.  She initially developed symptoms of demyelinating disease in 2015, when she had an episode of facial weakness.  Several months after that she developed diplopia and was found to have a sixth nerve palsy on examination.  MRI and CSF studies done at that time were consistent with multiple sclerosis.  The patient was placed on disease modifying therapy with glatiramer acetate, but had ongoing radiologic disease activity on that medication, leading to transition to the Plegridy formulation of interferon beta.  She continued to have radiologic progression on Plegridy and then was transitioned to ocrelizumab in February 2019.    Today, the patient denies any new episodic changes in vision, balance, strength or sensation suggestive of new relapse of multiple sclerosis since she was last seen.  She had some difficulty with the last ocrelizumab infusion, which was performed using the rapid infusion protocol.  She developed a scratchy throat and was treated with IV diphenhydramine, but did not like the way that this made her feel.  She is questioning whether she needs to be on medication at all.    Physical examination is as per Dr. Gao, and includes essentially non-focal neurologic examination.    Investigations: I reviewed the results of MRI scans of the brain and cervical spine performed on 09/28/2021. There was no abnormal enhancement with gadolinium contrast in the  brain and visualized cord parenchyma.  Multiple periventricular and juxtacortical foci of T2 hyperintensity in the white matter of the cerebral hemispheres bilaterally were unchanged from previous MRI of 03/02/2020, and no new lesions were seen.    Assessment/plan:    1. Multiple sclerosis  The patient remains clinically and radiologically stable on disease modifying therapy with ocrelizumab.  I counseled her strongly against withdrawing from disease modifying therapy altogether.  I told her that there is an approximately 90% chance of recurrent clinical disease activity if she does not continue disease modifying therapy.  I did emphasize to her that I think that she has been stable over the past 2-1/2 years because of the medication that she is on, and not because this is the natural history of her condition.      I reviewed oral medication options for multiple sclerosis, including methyl fumarate compounds, teriflunomide and fingolimod with her.  Regarding the latter, this is more efficacious than platform injectable drugs, although likely not quite as effective as ocrelizumab.  In addition, this medication carries risks of side effects including cardiac arrhythmias, macular edema of the retina, increased risk of basal cell carcinoma of the skin, and risk of opportunistic infections of the brain.  I told her that I am not convinced that this is a safer option than ocrelizumab.  Regarding the other options mentioned, these are generally in a similar tier of efficacy as the platform injectable drugs. If she was unwilling to consider other options, I would be in favor of her being on one of these treatments rather than nothing, but this would not be my recommendation for her either.    I suggested to her that she continue ocrelizumab with the standard infusion protocol and after discussion, she did indicate willingness to proceed with this.  Accordingly, she will get the next ocrelizumab infusion, with the target  date for the next infusion being 2022.    Today, I will obtain routine laboratory studies for monitoring of this medication to include complete blood counts with differential, hepatic panel and IgG level.  I will also check a vitamin D level and advise her on supplementation with vitamin D as needed to maintain her level within the goal range of 60-80 mcg per liter.  Currently, she is not on any vitamin D.    Tico Peace MD        D: 2021   T: 2021   MT: [id number]    Name:     BUBBA HERNÁNDEZJose  MRN:      [id number]        Account:    [id number]   :      1985           Visit Date: 2021     Document: [id number]

## 2021-11-01 NOTE — PROGRESS NOTES
[facility] OUTPATIENT MS CLINIC VISIT NOTE      Name of the patient: Stefania Austin  MRN: [id number]  : 1985  Date of visit: 21    Date of Onset: 2015  Date of Diagnosis: 2015  Initial Clinical Course: relapsing remitting  Current Clinical Course: Relapsing remitting  Past Disease Modifying Therapy(ies): glatiramer acetate  (discontinued due to new MRI lesions), Plegridy 6688-8880 (discontinued due to new MRI lesions)  Current Disease Modifying Therapy(ies): ocrelizumab since   Most Recent MRI of the Brain: 2021  Most Recent MRI of the Cervical Cord 2021  Most Recent MRI of the Thoracic Cord: 3/22/2016  Most Recent Lumbar Puncture: 3/7/2016, 15 oligoclonal bands, IgG index 0.87  Most Recent JCV: 2018, positive, index 4.03  Most Recent Remote Hepatitis Panel: 2020, negative  Most Recent VZV Ig2018, no immunologic exposure  Most Recent TB Quant: 2018, negative      REASON FOR VISIT: Stefania Austin is a 36 year old female who returns to the clinic today for regularly scheduled follow-up of her diagnosis of relapsing-remitting multiple sclerosis.  She was most recently seen virtually on 3/15/2021 by Dr. Peace.  She was most recently seen in-person by Dr. Peace on 3/5/2020.    HISTORY OF PRESENT ILLNESS:   Initial symptoms were right facial numbness and weakness on 2015.  At that time, she was treated with steroids and valacyclovir with resolution of symptoms.  She then developed left eye blurred vision and sixth nerve palsy in 2015.  Subsequent MRI 2015 was consistent with demyelinating disease and she was diagnosed with multiple sclerosis.  Initially started with glatiramer acetate in , however, new lesions were seen on MRI on this therapy.  She then switched to Plegridy therapy 9515-2709, but additional lesions indicative of active disease were seen on MRI.  She initiated ocrelizumab on 2019.  Since starting  ocrelizumab, there has been no evidence of disease progression clinically or radiologically.    INTERVAL HISTORY SINCE LAST VISIT:   Patient presented alone to this appointment.  She reports doing well since last appointment.      She reports an undesirable infusion reaction at last infusion (8/12/2021).  She experienced tingling in her throat and the infusion was temporarily stopped and restarted due to scratchy throat.  Reported dizziness and fatigue after additional diphenhydramine administration.  She was able to complete a full dose of ocrelizumab.  She would not like to continue ocrelizumab if the scratchy throat will be a common side effect.  She expressed desire to discontinue ocrelizumab and all disease modifying medications.    She continues to deny any clinical signs of new multiple sclerosis relapse, including vision changes, gait changes, numbness, weakness.  She reports she feels entirely normal ever since diagnosis of multiple sclerosis.    Pain in cervical spine MRI was performed on 9/28/2021.  During this appointment, reviewed MRI imaging with patient.  Discussed that there is no evidence of new disease activity.    She is currently taking amoxicillin after a recent dental infection and procedure.    PHYSICAL EXAMINATION:   VITAL SIGNS: /85 (BP Location: Right arm, Patient Position: Sitting, Cuff Size: Adult Large)   Pulse 74   Wt 129.8 kg (286 lb 1.6 oz)   BMI 41.05 kg/m    MENTAL STATUS: Alert,awake.  No word-finding difficulty. Oriented well.  CRANIAL NERVES: Visual fields are full to confrontation, no red desaturation. The pupils are round and react to light. Funduscopic examination attempted but not visualized due to need for face shield during COVID-19 pandemic. Extraocular movements are intact with no internuclear ophthalmoplegia. No nystagmus. Facial strength and sensation are normal. Hearing is normal. Palate elevation and tongue protrusion are normal.   MOTOR: No abnormal  "movements noted.  No pronator drift.  Normal muscle bulk. Tone is within normal limits in the limbs.  Strength is normal (5/5) in the following muscles/groups: Deltoids, biceps, triceps, wrist extensors, finger abductors,  hip flexors, knee flexors, foot dorsiflexors.  SENSORY: Reportedly intact/symmetric to light touch and pinprick throughout.  Romberg negative.  REFLEXES: Reflexes are dull 2+, present and symmetric at biceps, triceps, brachioradialis, knees and ankles.  CEREBELLAR:  There is no appendicular ataxia on finger-to-nose testing.  GAIT: Gait is narrow-based and steady and the patient is able to walk on heels, toes and in tandem without difficulty.     LABORATORY STUDIES:  To be collected today after appointment    IMAGING:  MRI brain 9/28/2021  \"Impression:   1. The study demonstrates 15-20 foci of T2-hyperintensity within the  cerebral white matter consistent with the clinical suspicion of  demyelinating disease. There are no foci of abnormal enhancement noted  intracranially .   2. There is no significant interval change from the prior study.\"    MRI cervical spine 9/28/2021  \"Impression:   1. Previously seen abnormal T2 hyperintense signal in the dorsal cord  at C5 is not seen on the present study. No new abnormal cord signal.  No abnormal enhancement.  2. No spinal canal or neural foraminal narrowing.\"    ASSESSMENT:  # Relapsing remitting multiple sclerosis  Currently clinically and radiologically stable while on ocrelizumab therapy.  Patient expressed desire to discontinue ocrelizumab given recent infusion reaction, discussed with patient that it is less desirable to discontinue therapies at this time.  Most likely the infusion reaction was associated with the rapid infusion protocol and that she would tolerate a slower infusion rate without issue.   Discussed alternative therapies including Gilenya (fingolimod), Tecfidera (dimethyl fumarate), Aubagio (teriflunomide).  Fingolimod is less desirable " given prior history of breakthrough disease while on Plegridy therapy, need for first dose monitoring, need for serial OCT's, undesirable risks (increased risk of basal cell carcinoma, increased risk of CNS infection).  Tecfidera is less desirable also given prior history of breakthrough disease while on Plegridy therapy, risk of CNS infection, undesirable side effects (flushing, GI discomfort, diarrhea).  Although not currently planning for pregnancy, patient has not completely ruled out a possibility of future pregnancy, so teriflunomide is not ideal.   Discussed with patient that she is likely to have additional multiple sclerosis relapses should she discontinue disease modifying therapies.  After discussion, patient agreed to proceed with next ocrelizumab infusion with slower infusion rate.    PLAN:  -Continue ocrelizumab therapy, next infusion due around 2/12/2022    -We will plan on using a slower infusion rate at next appointment to avoid undesirable infusion reaction    -Discussed with patient to call a 6 to 8 weeks in advance to schedule a desirable infusion date  -Screening laboratory studies today (hepatic panel, CBC, IgG, vitamin D level)    Patient to follow up in 6 month(s) as long as the patient is doing well.    Patient seen and discussed with attending physician, Dr. Peace.    Luz Gao MD  PGY-4 Neurology  11/01/2021     Attending physician: I saw and evaluated the patient with the resident and I agree with her findings and plan of care as documented above. Please see my note for additional details.    TERRY Peace MD

## 2021-11-01 NOTE — PATIENT INSTRUCTIONS
1. We will proceed with the next Ocrevus infusion in February (target date: 2-)    2. Blood tests today    3. Return to clinic in 6 months

## 2021-11-08 ENCOUNTER — MYC MEDICAL ADVICE (OUTPATIENT)
Dept: NEUROLOGY | Facility: CLINIC | Age: 36
End: 2021-11-08
Payer: COMMERCIAL

## 2022-02-15 ENCOUNTER — INFUSION THERAPY VISIT (OUTPATIENT)
Dept: INFUSION THERAPY | Facility: CLINIC | Age: 37
End: 2022-02-15
Payer: COMMERCIAL

## 2022-02-15 VITALS
SYSTOLIC BLOOD PRESSURE: 123 MMHG | OXYGEN SATURATION: 100 % | DIASTOLIC BLOOD PRESSURE: 85 MMHG | HEART RATE: 76 BPM | WEIGHT: 290.2 LBS | BODY MASS INDEX: 41.64 KG/M2 | TEMPERATURE: 98.7 F | RESPIRATION RATE: 16 BRPM

## 2022-02-15 DIAGNOSIS — G35 MS (MULTIPLE SCLEROSIS) (H): Primary | ICD-10-CM

## 2022-02-15 PROCEDURE — 96415 CHEMO IV INFUSION ADDL HR: CPT | Performed by: NURSE PRACTITIONER

## 2022-02-15 PROCEDURE — 99207 PR NO CHARGE LOS: CPT

## 2022-02-15 PROCEDURE — 96367 TX/PROPH/DG ADDL SEQ IV INF: CPT | Performed by: NURSE PRACTITIONER

## 2022-02-15 PROCEDURE — 96413 CHEMO IV INFUSION 1 HR: CPT | Performed by: NURSE PRACTITIONER

## 2022-02-15 RX ORDER — ALBUTEROL SULFATE 90 UG/1
1-2 AEROSOL, METERED RESPIRATORY (INHALATION)
Status: CANCELLED
Start: 2022-08-14

## 2022-02-15 RX ORDER — ACETAMINOPHEN 325 MG/1
650 TABLET ORAL ONCE
Status: COMPLETED | OUTPATIENT
Start: 2022-02-15 | End: 2022-02-15

## 2022-02-15 RX ORDER — ACETAMINOPHEN 325 MG/1
650 TABLET ORAL ONCE
Status: CANCELLED | OUTPATIENT
Start: 2022-08-14

## 2022-02-15 RX ORDER — MEPERIDINE HYDROCHLORIDE 25 MG/ML
25 INJECTION INTRAMUSCULAR; INTRAVENOUS; SUBCUTANEOUS EVERY 30 MIN PRN
Status: CANCELLED | OUTPATIENT
Start: 2022-08-14

## 2022-02-15 RX ORDER — ALBUTEROL SULFATE 0.83 MG/ML
2.5 SOLUTION RESPIRATORY (INHALATION)
Status: CANCELLED | OUTPATIENT
Start: 2022-08-14

## 2022-02-15 RX ORDER — DIPHENHYDRAMINE HYDROCHLORIDE 50 MG/ML
50 INJECTION INTRAMUSCULAR; INTRAVENOUS
Status: CANCELLED
Start: 2022-08-14

## 2022-02-15 RX ORDER — DIPHENHYDRAMINE HCL 25 MG
50 CAPSULE ORAL ONCE
Status: COMPLETED | OUTPATIENT
Start: 2022-02-15 | End: 2022-02-15

## 2022-02-15 RX ORDER — EPINEPHRINE 1 MG/ML
0.3 INJECTION, SOLUTION INTRAMUSCULAR; SUBCUTANEOUS EVERY 5 MIN PRN
Status: CANCELLED | OUTPATIENT
Start: 2022-08-14

## 2022-02-15 RX ORDER — METHYLPREDNISOLONE SODIUM SUCCINATE 125 MG/2ML
125 INJECTION, POWDER, LYOPHILIZED, FOR SOLUTION INTRAMUSCULAR; INTRAVENOUS ONCE
Status: CANCELLED | OUTPATIENT
Start: 2022-08-14

## 2022-02-15 RX ORDER — HEPARIN SODIUM (PORCINE) LOCK FLUSH IV SOLN 100 UNIT/ML 100 UNIT/ML
5 SOLUTION INTRAVENOUS
Status: CANCELLED | OUTPATIENT
Start: 2022-08-14

## 2022-02-15 RX ORDER — HEPARIN SODIUM,PORCINE 10 UNIT/ML
5 VIAL (ML) INTRAVENOUS
Status: CANCELLED | OUTPATIENT
Start: 2022-08-14

## 2022-02-15 RX ORDER — DIPHENHYDRAMINE HCL 25 MG
50 CAPSULE ORAL ONCE
Status: CANCELLED | OUTPATIENT
Start: 2022-08-14

## 2022-02-15 RX ORDER — NALOXONE HYDROCHLORIDE 0.4 MG/ML
0.2 INJECTION, SOLUTION INTRAMUSCULAR; INTRAVENOUS; SUBCUTANEOUS
Status: CANCELLED | OUTPATIENT
Start: 2022-08-14

## 2022-02-15 RX ORDER — METHYLPREDNISOLONE SODIUM SUCCINATE 125 MG/2ML
125 INJECTION, POWDER, LYOPHILIZED, FOR SOLUTION INTRAMUSCULAR; INTRAVENOUS
Status: CANCELLED
Start: 2022-08-14

## 2022-02-15 RX ORDER — METHYLPREDNISOLONE SODIUM SUCCINATE 125 MG/2ML
125 INJECTION, POWDER, LYOPHILIZED, FOR SOLUTION INTRAMUSCULAR; INTRAVENOUS ONCE
Status: COMPLETED | OUTPATIENT
Start: 2022-02-15 | End: 2022-02-15

## 2022-02-15 RX ADMIN — Medication 50 MG: at 08:53

## 2022-02-15 RX ADMIN — ACETAMINOPHEN 650 MG: 325 TABLET ORAL at 08:52

## 2022-02-15 RX ADMIN — METHYLPREDNISOLONE SODIUM SUCCINATE 125 MG: 125 INJECTION INTRAMUSCULAR; INTRAVENOUS at 08:58

## 2022-02-15 RX ADMIN — Medication 250 ML: at 08:56

## 2022-02-15 NOTE — PROGRESS NOTES
Infusion Nursing Note:  Stefania Sanabriaman presents today for [employer].    Patient seen by provider today: No   present during visit today: Not Applicable.    Note: Patient reports feeling well today. Patient had met with Dr. Peace in November and plan to run today at slower rate as pt did not tolerate transfusion well last visit (8/12/2021).      Intravenous Access:  Peripheral IV placed.    Treatment Conditions:  Biological Infusion Checklist:  ~~~ NOTE: If the patient answers yes to any of the questions below, hold the infusion and contact ordering provider or on-call provider.    1. Have you recently had an elevated temperature, fever, chills, productive cough, coughing for 3 weeks or longer or hemoptysis, abnormal vital signs, night sweats,  chest pain or have you noticed a decrease in your appetite, unexplained weight loss or fatigue? No  2. Do you have any open wounds or new incisions? No  3. Do you have any recent or upcoming hospitalizations, surgeries or dental procedures? No  4. Do you currently have or recently have had any signs of illness or infection or are you on any antibiotics? No  5. Have you had any new, sudden or worsening abdominal pain? No  6. Have you or anyone in your household received a live vaccination in the past 4 weeks? Please note:  No live vaccines while on biologic/chemotherapy until 6 months after the last treatment.  Patient can receive the flu vaccine (shot only) and the pneumovax.  It is optimal for the patient to get these vaccines mid cycle, but they can be given at any time as long as it is not on the day of the infusion. No  7. Have you recently been diagnosed with any new nervous system diseases (ie. Multiple sclerosis, Guillain Dana, seizures, neurological changes) or cancer diagnosis? No  8. Are you on any form of radiation or chemotherapy? No  9. Are you pregnant or breast feeding or do you have plans of pregnancy in the future? No  10. Have there been  any other new onset medical symptoms? No        Post Infusion Assessment:  Patient tolerated infusion without incident. Observed for 1 hour after infusion.  Site patent and intact, free from redness, edema or discomfort.  No evidence of extravasations.  Access discontinued per protocol.       Discharge Plan:   AVS to patient via MYCHART.  Patient will return 8/16/2022 for next appointment.   Patient discharged in stable condition accompanied by: self.  Departure Mode: Ambulatory.      Lianet Das RN

## 2022-05-01 ENCOUNTER — HEALTH MAINTENANCE LETTER (OUTPATIENT)
Age: 37
End: 2022-05-01

## 2022-05-02 ENCOUNTER — OFFICE VISIT (OUTPATIENT)
Dept: NEUROLOGY | Facility: CLINIC | Age: 37
End: 2022-05-02
Payer: COMMERCIAL

## 2022-05-02 ENCOUNTER — TELEPHONE (OUTPATIENT)
Dept: NEUROLOGY | Facility: CLINIC | Age: 37
End: 2022-05-02

## 2022-05-02 VITALS
DIASTOLIC BLOOD PRESSURE: 82 MMHG | HEART RATE: 80 BPM | WEIGHT: 293 LBS | SYSTOLIC BLOOD PRESSURE: 131 MMHG | BODY MASS INDEX: 42.26 KG/M2

## 2022-05-02 DIAGNOSIS — G35 MS (MULTIPLE SCLEROSIS) (H): Primary | ICD-10-CM

## 2022-05-02 PROCEDURE — 99213 OFFICE O/P EST LOW 20 MIN: CPT | Mod: GC | Performed by: PSYCHIATRY & NEUROLOGY

## 2022-05-02 NOTE — PATIENT INSTRUCTIONS
Continue Ocrevus infusions as scheduled, with the next on August 16    2.  Blood tests on the day of the infusion    3.  MRI scans of the brain and cervical spine in 6 months and return to clinic after    4. Recommend that you take 4000 units of vitamin D3 daily

## 2022-05-02 NOTE — PROGRESS NOTES
Date of service: May 2, 2022    Referral source: Established patient    Chief complaint: Multiple sclerosis    History of the Present Illness:  Ms. Stefania Austin is a 36-year-old woman who returns to the Multiple Sclerosis Clinic today for a scheduled follow-up visit regarding her diagnosis of multiple sclerosis.    The patient's history is as per previous notes.  She initially developed symptoms of demyelinating disease in 2015, when she had an episode of facial weakness.  Several months after that she developed diplopia and was found to have a sixth nerve palsy on examination.  MRI and CSF studies done at that time were consistent with multiple sclerosis.  The patient was placed on disease modifying therapy with glatiramer acetate, but had ongoing radiologic disease activity on that medication, leading to transition to the Plegridy formulation of interferon beta.  She continued to have radiologic progression on Plegridy and then was transitioned to ocrelizumab in February 2019.    Today, the patient denies any new episodic changes in vision, balance, strength or sensation suggestive of new relapse of multiple sclerosis since she was last seen on 11/1/2021.  She had some difficulty with the ocrelizumab infusion on 8/12/2021, which was performed using the rapid infusion protocol.  She developed a scratchy throat and was treated with IV diphenhydramine, but did not like the way that this made her feel.  Her most recent ocrelizumab infusion was on 2/15/2022, and was completed without any issues.    Physical Examination:  Vital signs: /82 (BP Location: Right arm, Patient Position: Sitting, Cuff Size: Adult Large)   Pulse 80   Wt 133.6 kg (294 lb 8 oz)   BMI 42.26 kg/m    General: Overweight adult female seated comfortably in chair  Resp: non-labored on room air  Skin: No rash or lesion   Psych: Mood pleasant, affect congruent  Neuro:  Mental status: Awake, alert, attentive, oriented to self, time, place, and  circumstance. Language is fluent and coherent with intact comprehension of complex commands, naming and repetition.  Cranial nerves: Conjugate gaze, EOMI, face symmetric, no dysarthria.   Motor: Normal bulk and tone. No abnormal movements. 5/5 strength in bilateral arm abduction, arm flexion, arm extension, wrist flexion, wrist extension, finger flexion, finger extension, finger abduction, thumb abduction, hip flexion, leg flexion, leg extension. Finger tapping and foot tapping of normal speed and rhythm bilaterally.   Reflexes: Normoreflexic and symmetric biceps, brachioradialis, triceps, patellae, and achilles.  Sensory: Intact and symmetric to light touch in bilateral upper and lower extremities  Coordination: FNF and HS without ataxia or dysmetria.  Gait: Normal width, stride length, turn, and arm swing. Station normal. Heel, toe, and tandem walk intact without gait aid or assist.    Investigations: Lab studies were last checked on 11/1/2021. IgG and LFTs are within normal limits. CBC is stable compared to previous. Vitamin D is low at 29.     Assessment/plan:    1. Multiple sclerosis  The patient remains clinically and radiologically stable on disease modifying therapy with ocrelizumab.  I counseled her strongly against withdrawing from disease modifying therapy altogether.  I told her that there is an approximately 90% chance of recurrent clinical disease activity if she does not continue disease modifying therapy.  We continue to emphasize to her that she has likely been stable over the past 3 years because of the medication that she is on, and not because this is the natural history of her condition.    Previous counseling with Stefania has included review of oral medication options for multiple sclerosis, including methyl fumarate compounds, teriflunomide and fingolimod.  Regarding the latter, this is more efficacious than platform injectable drugs, although likely not quite as effective as ocrelizumab.  In  addition, this medication carries risks of side effects including cardiac arrhythmias, macular edema of the retina, increased risk of basal cell carcinoma of the skin, and risk of opportunistic infections of the brain.  She was encouraged to continue with ocrelizumab, and today reaffirms that she is in agreement with this option. She will get the next ocrelizumab infusion, with the target date for the next infusion being 08/12/2022.    Today, I will obtain routine laboratory studies for monitoring of this medication to include complete blood counts with differential, hepatic panel and IgG level.  I will also check a vitamin D level and advise her on supplementation with vitamin D as needed to maintain her level within the goal range of 60-80 mcg per liter.  She has been vitamin D deficient in the past and today reports that she is currently not on any vitamin D. She notes that she does take a multivitamin, and we discussed that these typically do not contain amounts of vitamin D adequate to keep her within goal range. She expressed her understanding for the need to take vitamin D supplementation.     She will return to clinic for follow up in 6 months, with repeat MRI brain and cervical spine ordered to be completed prior to her next appointment.     Patient was seen and discussed with attending neurologist Dr. Tico Peace.    Gayle Stevenson MD  Neurology Resident, PGY-4    Attending physician: I saw and evaluated the patient with the resident and I agree with her findings and plan of care as documented above. Please see my note for additional details.    TERRY Peace MD

## 2022-05-02 NOTE — LETTER
5/2/2022      RE: Stefania Austin  [address]  [place] [address]     Referral source: Established patient    Chief complaint: Multiple sclerosis    Assisted by: Gayle Stevenson MD, PGY-4, Neurology resident    History of the Present Illness: Ms. Stefania Austin is a 37-year-old woman who returns to the Multiple Sclerosis Clinic today for a scheduled follow-up visit regarding her diagnosis of multiple sclerosis.  I was assisted in this evaluation today by Dr. Stevenson, and her documentation should be considered integral to this note.    The patient's history is as per my previous notes.  Initial onset of symptoms of demyelinating disease was in 2015 when she had an episode of facial weakness.  Several months later, she developed a new sixth nerve palsy, and MRI and CSF studies done at that time were consistent with multiple sclerosis.  She was initially treated with glatiramer acetate, but had ongoing radiologic disease activity on that medication, leading to transition to the Plegridy formulation of interferon beta.  She had further radiologic progression on that medication as well, and most recently was transitioned to ocrelizumab in 2019, with the last infusion completed on 02/15/2022.    Today, the patient denies any new episodic changes in vision, balance, strength or sensation suggestive of new relapse of multiple sclerosis since she was last seen.  She tolerated the last ocrelizumab infusion without difficulty.    Physical examination is as per Dr. Stevenson, and is essentially non-focal.    Assessment/plan:     1.  Multiple sclerosis  The patient remains clinically stable with no evidence of active inflammatory demyelination on current disease-modifying therapy with ocrelizumab.  She will continue these infusions, with the next to be performed as scheduled on 08/16/2022.    On the day of the infusion, I will check routine laboratory studies for monitoring of this medication to include complete blood  counts with differential, hepatic panel, and total antibody (IgG) level.  I will also check a vitamin D level and advise the patient on supplementation with vitamin D as needed to maintain her level within the goal range of 60-80 mcg per liter.  She relates that she has not been taking vitamin D, and I advised her to start 4000 International Units of vitamin D supplementation on a daily basis.    I will see her back in 6 months for a review, with MRI scans of the brain and cervical spine to be performed prior to that appointment to monitor the radiologic stability of her condition.    Tico Peace MD   of Neurology  [facility]    Cc:  Porsha Veliz MD (PCP)  Patient

## 2022-05-02 NOTE — TELEPHONE ENCOUNTER
5/2 initially scheduled Cervical MRI at  but Brain could not be scheduled at [place], I called patient and left a message that patient needs to call [phone] to get Brain MRI done at the [facility].       Ashlyn Benito Procedure   Neurology & Neurosurgery Specialties  [facility] [place]   [phone]

## 2022-05-02 NOTE — Clinical Note
5/2/2022         RE: Stefania Austin  [address]  [facility] [address]        Dear Colleague,    Thank you for referring your patient, Stefania Austin, to the [facility] [place]. Please see a copy of my visit note below.    Date of service: May 2, 2022    Referral source: Established patient    Chief complaint: Multiple sclerosis    History of the Present Illness:  Ms. Stefania Austin is a 36-year-old woman who returns to the Multiple Sclerosis Clinic today for a scheduled follow-up visit regarding her diagnosis of multiple sclerosis.    The patient's history is as per previous notes.  She initially developed symptoms of demyelinating disease in 2015, when she had an episode of facial weakness.  Several months after that she developed diplopia and was found to have a sixth nerve palsy on examination.  MRI and CSF studies done at that time were consistent with multiple sclerosis.  The patient was placed on disease modifying therapy with glatiramer acetate, but had ongoing radiologic disease activity on that medication, leading to transition to the Plegridy formulation of interferon beta.  She continued to have radiologic progression on Plegridy and then was transitioned to ocrelizumab in February 2019.    Today, the patient denies any new episodic changes in vision, balance, strength or sensation suggestive of new relapse of multiple sclerosis since she was last seen on 11/1/2021.  She had some difficulty with the ocrelizumab infusion on 8/12/2021, which was performed using the rapid infusion protocol.  She developed a scratchy throat and was treated with IV diphenhydramine, but did not like the way that this made her feel.  Her most recent ocrelizumab infusion was on 2/15/2022, and was completed without any issues.    Physical Examination:  Vital signs: /82 (BP Location: Right arm, Patient Position: Sitting, Cuff Size: Adult Large)   Pulse 80   Wt 133.6 kg  (294 lb 8 oz)   BMI 42.26 kg/m    General: Overweight adult female seated comfortably in chair  Resp: non-labored on room air  Skin: No rash or lesion   Psych: Mood pleasant, affect congruent  Neuro:  Mental status: Awake, alert, attentive, oriented to self, time, place, and circumstance. Language is fluent and coherent with intact comprehension of complex commands, naming and repetition.  Cranial nerves: Conjugate gaze, EOMI, face symmetric, no dysarthria.   Motor: Normal bulk and tone. No abnormal movements. 5/5 strength in bilateral arm abduction, arm flexion, arm extension, wrist flexion, wrist extension, finger flexion, finger extension, finger abduction, thumb abduction, hip flexion, leg flexion, leg extension. Finger tapping and foot tapping of normal speed and rhythm bilaterally.   Reflexes: Normoreflexic and symmetric biceps, brachioradialis, triceps, patellae, and achilles.  Sensory: Intact and symmetric to light touch in bilateral upper and lower extremities  Coordination: FNF and HS without ataxia or dysmetria.  Gait: Normal width, stride length, turn, and arm swing. Station normal. Heel, toe, and tandem walk intact without gait aid or assist.    Investigations: Lab studies were last checked on 11/1/2021. IgG and LFTs are within normal limits. CBC is stable compared to previous. Vitamin D is low at 29.     Assessment/plan:    1. Multiple sclerosis  The patient remains clinically and radiologically stable on disease modifying therapy with ocrelizumab.  I counseled her strongly against withdrawing from disease modifying therapy altogether.  I told her that there is an approximately 90% chance of recurrent clinical disease activity if she does not continue disease modifying therapy.  We continue to emphasize to her that she has likely been stable over the past 3 years because of the medication that she is on, and not because this is the natural history of her condition.    Previous counseling with Stefania  has included review of oral medication options for multiple sclerosis, including methyl fumarate compounds, teriflunomide and fingolimod.  Regarding the latter, this is more efficacious than platform injectable drugs, although likely not quite as effective as ocrelizumab.  In addition, this medication carries risks of side effects including cardiac arrhythmias, macular edema of the retina, increased risk of basal cell carcinoma of the skin, and risk of opportunistic infections of the brain.  She was encouraged to continue with ocrelizumab, and today reaffirms that she is in agreement with this option. She will get the next ocrelizumab infusion, with the target date for the next infusion being 08/12/2022.    Today, I will obtain routine laboratory studies for monitoring of this medication to include complete blood counts with differential, hepatic panel and IgG level.  I will also check a vitamin D level and advise her on supplementation with vitamin D as needed to maintain her level within the goal range of 60-80 mcg per liter.  She has been vitamin D deficient in the past and today reports that she is currently not on any vitamin D. She notes that she does take a multivitamin, and we discussed that these typically do not contain amounts of vitamin D adequate to keep her within goal range. She expressed her understanding for the need to take vitamin D supplementation.     She will return to clinic for follow up in 6 months, with repeat MRI brain and cervical spine ordered to be completed prior to her next appointment.     Patient was seen and discussed with attending neurologist Dr. Tico Peace.    Gayle Stevenson MD  Neurology Resident, PGY-4    Attending physician: I saw and evaluated the patient with the resident and I agree with her findings and plan of care as documented above. Please see my note for additional details.    TERRY Peace MD    Date of service: May 2, 2022    Referral source: Established  patient    Chief complaint: Multiple sclerosis    Assisted by: Gayle Stevenson MD, PGY-4, Neurology resident    History of the Present Illness: Ms. Stefania Austin is a 37-year-old woman who returns to the Multiple Sclerosis Clinic today for a scheduled follow-up visit regarding her diagnosis of multiple sclerosis.  I was assisted in this evaluation today by Dr. Stevenson, and her documentation should be considered integral to this note.    The patient's history is as per my previous notes.  Initial onset of symptoms of demyelinating disease was in 2015 when she had an episode of facial weakness.  Several months later, she developed a new sixth nerve palsy, and MRI and CSF studies done at that time were consistent with multiple sclerosis.  She was initially treated with glatiramer acetate, but had ongoing radiologic disease activity on that medication, leading to transition to the Plegridy formulation of interferon beta.  She had further radiologic progression on that medication as well, and most recently was transitioned to ocrelizumab in 2019, with the last infusion completed on 02/15/2022.    Today, the patient denies any new episodic changes in vision, balance, strength or sensation suggestive of new relapse of multiple sclerosis since she was last seen.  She tolerated the last ocrelizumab infusion without difficulty.    Physical examination is as per Dr. Stevenson, and is essentially non-focal.    Assessment/plan:     1.  Multiple sclerosis  The patient remains clinically stable with no evidence of active inflammatory demyelination on current disease-modifying therapy with ocrelizumab.  She will continue these infusions, with the next to be performed as scheduled on 08/16/2022.    On the day of the infusion, I will check routine laboratory studies for monitoring of this medication to include complete blood counts with differential, hepatic panel, and total antibody (IgG) level.  I will also check a vitamin D level and  advise the patient on supplementation with vitamin D as needed to maintain her level within the goal range of 60-80 mcg per liter.  She relates that she has not been taking vitamin D, and I advised her to start 4000 International Units of vitamin D supplementation on a daily basis.    I will see her back in 6 months for a review, with MRI scans of the brain and cervical spine to be performed prior to that appointment to monitor the radiologic stability of her condition.    Tico Peace MD        D: 2022   T: 2022   MT: MAHENDRA    Name:     BUBBA HERNÁNDEZ  MRN:      [id number]        Account:    [id number]   :      1985           Visit Date: 2022     Document: [id number]      Again, thank you for allowing me to participate in the care of your patient.        Sincerely,        Tico Peace MD

## 2022-05-05 NOTE — PROGRESS NOTES
Date of service: May 2, 2022    Referral source: Established patient    Chief complaint: Multiple sclerosis    Assisted by: Gayle Stevenson MD, PGY-4, Neurology resident    History of the Present Illness: Ms. Stefania Austin is a 37-year-old woman who returns to the Multiple Sclerosis Clinic today for a scheduled follow-up visit regarding her diagnosis of multiple sclerosis.  I was assisted in this evaluation today by Dr. Stevenson, and her documentation should be considered integral to this note.    The patient's history is as per my previous notes.  Initial onset of symptoms of demyelinating disease was in 2015 when she had an episode of facial weakness.  Several months later, she developed a new sixth nerve palsy, and MRI and CSF studies done at that time were consistent with multiple sclerosis.  She was initially treated with glatiramer acetate, but had ongoing radiologic disease activity on that medication, leading to transition to the Plegridy formulation of interferon beta.  She had further radiologic progression on that medication as well, and most recently was transitioned to ocrelizumab in 2019, with the last infusion completed on 02/15/2022.    Today, the patient denies any new episodic changes in vision, balance, strength or sensation suggestive of new relapse of multiple sclerosis since she was last seen.  She tolerated the last ocrelizumab infusion without difficulty.    Physical examination is as per Dr. Stevenson, and is essentially non-focal.    Assessment/plan:     1.  Multiple sclerosis  The patient remains clinically stable with no evidence of active inflammatory demyelination on current disease-modifying therapy with ocrelizumab.  She will continue these infusions, with the next to be performed as scheduled on 08/16/2022.    On the day of the infusion, I will check routine laboratory studies for monitoring of this medication to include complete blood counts with differential, hepatic panel, and total  antibody (IgG) level.  I will also check a vitamin D level and advise the patient on supplementation with vitamin D as needed to maintain her level within the goal range of 60-80 mcg per liter.  She relates that she has not been taking vitamin D, and I advised her to start 4000 International Units of vitamin D supplementation on a daily basis.    I will see her back in 6 months for a review, with MRI scans of the brain and cervical spine to be performed prior to that appointment to monitor the radiologic stability of her condition.    Tico Peace MD        D: 2022   T: 2022   MT: MAHENDRA    Name:     BUBBA HERNÁNDEZ  MRN:      [id number]        Account:    [id number]   :      1985           Visit Date: 2022     Document: [id number]

## 2022-08-16 ENCOUNTER — INFUSION THERAPY VISIT (OUTPATIENT)
Dept: INFUSION THERAPY | Facility: CLINIC | Age: 37
End: 2022-08-16
Payer: COMMERCIAL

## 2022-08-16 VITALS
OXYGEN SATURATION: 100 % | RESPIRATION RATE: 16 BRPM | TEMPERATURE: 98.3 F | BODY MASS INDEX: 41.9 KG/M2 | DIASTOLIC BLOOD PRESSURE: 85 MMHG | HEART RATE: 83 BPM | SYSTOLIC BLOOD PRESSURE: 129 MMHG | WEIGHT: 292 LBS

## 2022-08-16 DIAGNOSIS — G35 MS (MULTIPLE SCLEROSIS) (H): Primary | ICD-10-CM

## 2022-08-16 PROCEDURE — 96375 TX/PRO/DX INJ NEW DRUG ADDON: CPT | Performed by: INTERNAL MEDICINE

## 2022-08-16 PROCEDURE — 99207 PR NO CHARGE LOS: CPT

## 2022-08-16 PROCEDURE — 96415 CHEMO IV INFUSION ADDL HR: CPT | Performed by: INTERNAL MEDICINE

## 2022-08-16 PROCEDURE — 96413 CHEMO IV INFUSION 1 HR: CPT | Performed by: INTERNAL MEDICINE

## 2022-08-16 RX ORDER — MEPERIDINE HYDROCHLORIDE 25 MG/ML
25 INJECTION INTRAMUSCULAR; INTRAVENOUS; SUBCUTANEOUS EVERY 30 MIN PRN
Status: CANCELLED | OUTPATIENT
Start: 2023-02-10

## 2022-08-16 RX ORDER — ACETAMINOPHEN 325 MG/1
650 TABLET ORAL ONCE
Status: CANCELLED | OUTPATIENT
Start: 2023-02-10

## 2022-08-16 RX ORDER — DIPHENHYDRAMINE HCL 25 MG
50 CAPSULE ORAL ONCE
Status: CANCELLED | OUTPATIENT
Start: 2023-02-10

## 2022-08-16 RX ORDER — ALBUTEROL SULFATE 90 UG/1
1-2 AEROSOL, METERED RESPIRATORY (INHALATION)
Status: CANCELLED
Start: 2023-02-10

## 2022-08-16 RX ORDER — EPINEPHRINE 1 MG/ML
0.3 INJECTION, SOLUTION INTRAMUSCULAR; SUBCUTANEOUS EVERY 5 MIN PRN
Status: CANCELLED | OUTPATIENT
Start: 2023-02-10

## 2022-08-16 RX ORDER — HEPARIN SODIUM (PORCINE) LOCK FLUSH IV SOLN 100 UNIT/ML 100 UNIT/ML
5 SOLUTION INTRAVENOUS
Status: CANCELLED | OUTPATIENT
Start: 2023-02-10

## 2022-08-16 RX ORDER — DIPHENHYDRAMINE HCL 25 MG
50 CAPSULE ORAL ONCE
Status: COMPLETED | OUTPATIENT
Start: 2022-08-16 | End: 2022-08-16

## 2022-08-16 RX ORDER — ALBUTEROL SULFATE 0.83 MG/ML
2.5 SOLUTION RESPIRATORY (INHALATION)
Status: CANCELLED | OUTPATIENT
Start: 2023-02-10

## 2022-08-16 RX ORDER — METHYLPREDNISOLONE SODIUM SUCCINATE 125 MG/2ML
125 INJECTION, POWDER, LYOPHILIZED, FOR SOLUTION INTRAMUSCULAR; INTRAVENOUS ONCE
Status: COMPLETED | OUTPATIENT
Start: 2022-08-16 | End: 2022-08-16

## 2022-08-16 RX ORDER — DIPHENHYDRAMINE HYDROCHLORIDE 50 MG/ML
50 INJECTION INTRAMUSCULAR; INTRAVENOUS
Status: CANCELLED
Start: 2023-02-10

## 2022-08-16 RX ORDER — ACETAMINOPHEN 325 MG/1
650 TABLET ORAL ONCE
Status: COMPLETED | OUTPATIENT
Start: 2022-08-16 | End: 2022-08-16

## 2022-08-16 RX ORDER — HEPARIN SODIUM,PORCINE 10 UNIT/ML
5 VIAL (ML) INTRAVENOUS
Status: CANCELLED | OUTPATIENT
Start: 2023-02-10

## 2022-08-16 RX ORDER — METHYLPREDNISOLONE SODIUM SUCCINATE 125 MG/2ML
125 INJECTION, POWDER, LYOPHILIZED, FOR SOLUTION INTRAMUSCULAR; INTRAVENOUS ONCE
Status: CANCELLED | OUTPATIENT
Start: 2023-02-10

## 2022-08-16 RX ORDER — METHYLPREDNISOLONE SODIUM SUCCINATE 125 MG/2ML
125 INJECTION, POWDER, LYOPHILIZED, FOR SOLUTION INTRAMUSCULAR; INTRAVENOUS
Status: CANCELLED
Start: 2023-02-10

## 2022-08-16 RX ORDER — NALOXONE HYDROCHLORIDE 0.4 MG/ML
0.2 INJECTION, SOLUTION INTRAMUSCULAR; INTRAVENOUS; SUBCUTANEOUS
Status: CANCELLED | OUTPATIENT
Start: 2023-02-10

## 2022-08-16 RX ADMIN — Medication 50 MG: at 09:15

## 2022-08-16 RX ADMIN — Medication 250 ML: at 09:13

## 2022-08-16 RX ADMIN — ACETAMINOPHEN 650 MG: 325 TABLET ORAL at 09:15

## 2022-08-16 RX ADMIN — METHYLPREDNISOLONE SODIUM SUCCINATE 125 MG: 125 INJECTION INTRAMUSCULAR; INTRAVENOUS at 09:16

## 2022-08-16 ASSESSMENT — PAIN SCALES - GENERAL: PAINLEVEL: NO PAIN (0)

## 2022-08-16 NOTE — PROGRESS NOTES
Infusion Nursing Note:  Stefania S Poli presents today for Non rapid Ocrevus.    Patient seen by provider today: No   present during visit today: Not Applicable.    Note: Pt reports tolerating Ocrevus well at the slower rate per orders. No concerns at this time.    Intravenous Access:  Peripheral IV placed.    Treatment Conditions:  Biological Infusion Checklist:  ~~~ NOTE: If the patient answers yes to any of the questions below, hold the infusion and contact ordering provider or on-call provider.    1. Have you recently had an elevated temperature, fever, chills, productive cough, coughing for 3 weeks or longer or hemoptysis, abnormal vital signs, night sweats,  chest pain or have you noticed a decrease in your appetite, unexplained weight loss or fatigue? No  2. Do you have any open wounds or new incisions? No  3. Do you have any recent or upcoming hospitalizations, surgeries or dental procedures? No  4. Do you currently have or recently have had any signs of illness or infection or are you on any antibiotics? No  5. Have you had any new, sudden or worsening abdominal pain? No  6. Have you or anyone in your household received a live vaccination in the past 4 weeks? Please note:  No live vaccines while on biologic/chemotherapy until 6 months after the last treatment.  Patient can receive the flu vaccine (shot only) and the pneumovax.  It is optimal for the patient to get these vaccines mid cycle, but they can be given at any time as long as it is not on the day of the infusion. No  7. Have you recently been diagnosed with any new nervous system diseases (ie. Multiple sclerosis, Guillain [place], seizures, neurological changes) or cancer diagnosis? No  8. Are you on any form of radiation or chemotherapy? No  9. Are you pregnant or breast feeding or do you have plans of pregnancy in the future? No  10. Have you been having any signs of worsening depression or suicidal ideations?  (benlysta only)  No  11. Have there been any other new onset medical symptoms? No    Post Infusion Assessment:  Patient tolerated infusion without incident.  Patient observed for 60 minutes post Ocrevus per protocol.  Site patent and intact, free from redness, edema or discomfort.  No evidence of extravasations.  Access discontinued per protocol.     Discharge Plan:   AVS to patient via MYCHART.  Patient will return in 6 months for next appointment.   Patient discharged in stable condition accompanied by: self.  Departure Mode: Ambulatory.      Magdalena James RN

## 2022-10-26 ENCOUNTER — ANCILLARY PROCEDURE (OUTPATIENT)
Dept: GENERAL RADIOLOGY | Facility: CLINIC | Age: 37
End: 2022-10-26
Attending: FAMILY MEDICINE
Payer: OTHER MISCELLANEOUS

## 2022-10-26 ENCOUNTER — OFFICE VISIT (OUTPATIENT)
Dept: FAMILY MEDICINE | Facility: CLINIC | Age: 37
End: 2022-10-26
Payer: OTHER MISCELLANEOUS

## 2022-10-26 VITALS
WEIGHT: 290.13 LBS | BODY MASS INDEX: 41.63 KG/M2 | DIASTOLIC BLOOD PRESSURE: 82 MMHG | SYSTOLIC BLOOD PRESSURE: 135 MMHG | HEART RATE: 79 BPM | TEMPERATURE: 97.1 F

## 2022-10-26 DIAGNOSIS — M79.641 PAIN OF RIGHT HAND: Primary | ICD-10-CM

## 2022-10-26 DIAGNOSIS — S52.124A CLOSED NONDISPLACED FRACTURE OF HEAD OF RIGHT RADIUS, INITIAL ENCOUNTER: ICD-10-CM

## 2022-10-26 DIAGNOSIS — M79.621 PAIN OF RIGHT UPPER ARM: ICD-10-CM

## 2022-10-26 DIAGNOSIS — M79.631 PAIN OF RIGHT FOREARM: ICD-10-CM

## 2022-10-26 PROCEDURE — 73130 X-RAY EXAM OF HAND: CPT | Mod: TC | Performed by: RADIOLOGY

## 2022-10-26 PROCEDURE — 99213 OFFICE O/P EST LOW 20 MIN: CPT | Performed by: FAMILY MEDICINE

## 2022-10-26 PROCEDURE — 73060 X-RAY EXAM OF HUMERUS: CPT | Mod: TC | Performed by: RADIOLOGY

## 2022-10-26 PROCEDURE — 73090 X-RAY EXAM OF FOREARM: CPT | Mod: TC | Performed by: RADIOLOGY

## 2022-10-26 ASSESSMENT — PAIN SCALES - GENERAL: PAINLEVEL: NO PAIN (0)

## 2022-10-26 NOTE — PATIENT INSTRUCTIONS
You have a  small fracture of radius bone     Use sling    Tylenol, ibuprofen, and muscle relaxer as needed    Call to schedule  with sports medicine or orthopedics to be seen in next few days    No work till see specialist

## 2022-10-26 NOTE — LETTER
[facility]  [address] [place] [address]  [place] [address]  Phone: [phone]      REPORT OF WORK ABILITY    NOTE TO EMPLOYEE: You must promptly provide a copy of this report to your  employer or worker's compensation insurer, and Qualified Rehabilitation Consultant.    Date: 10/26/2022                     Employee Name: Stefania Ashton         YOB: 1985  Medical Record Number: [id number]   Soc.Sec.No: [id number]  Employer: None                Date of Injury: 10-  Managed Care Organization / Insurance Company Name: UNKNOWN    Diagnosis: right elbow radial  Head  fracture  Work Related: yes     MMI: NO   Permanent Partial Disability(PPD) likely: UNKNOWN    EMPLOYEE IS ABLE TO WORK: off work till sees specialist     RESTRICTIONS IF ANY:         OTHER RESTRICTIONS:no work till see specialist    TREATMENT PLAN/NOTES: use sling for comfort  Tyleno/ ibuprofen/ muscle relaxer as needed     Patient to follow up with  Sports medicine or orthopedics  In the next few days .      Iain Moura MD

## 2022-10-26 NOTE — PROGRESS NOTES
Godwin Aguiar is a 37 year old, presenting for the following health issues:  Work Comp      HPI     ED/UC Followup:    Facility:  [facility]  Date of visit: 10/25/2022  Reason for visit: arm injury at work  Current Status: stable         Review of Systems    right arm and left ankle    Fell at work    Works as para at elementary school    Missed step    Reviewed emergency room note in detail    Arm more bothersome than ankle    This is work comp      Objective    Wt 131.6 kg (290 lb 2 oz)   BMI 41.63 kg/m    Body mass index is 41.63 kg/m .  Physical Exam   Full physical not done     Mentation and affect are fine    No tremor of speech or extremity    Breathing fine    Patient stands and walks okay    Mild subjective discomfort over central dorsum of left ankle but no tenderness at all over either malleoli    Range of motion of foot / ankle fair    No obvious redness / warmth    Minimal if any swelling    The right arm seems to bother patient much more so than the left ankle    She is reluctant to move the arm much, especially at elbow and wrist    Range of motion of shoulder fair    No particular point tenderness over shoulder, neck. Trapezius, or upper arm    Patient is tender over both sides of elbow and some general discomfort on palpation of forearm and wrist but not pinpoint     Hand/ fingers nontender    No obvious swelling    When she or it rotates wrist at all ( supination / pronation ) she has discomfort up forearm and she points more to prox radial than ulnar side when asked where it hurts more    xrays of arm done; one view shows small irregularity  /  Likely fx of radial head               ASSESSMENT / PLAN:  (M47.442) Pain of right hand  (primary encounter diagnosis)  Comment: see above   Plan: XR Hand Right G/E 3 Views              (M79.221) Pain of right forearm  Comment: see above   Plan: XR Forearm Right 2 Views             (M79.921) Pain of right upper arm  Comment: see above   Plan: XR  Humerus Right G/E 2 Views              (E01.523W) Closed nondisplaced fracture of head of right radius, initial encounter  Comment: patient will use sling  Discussed over the counter meds and the muscle relaxer.  Schedule with sports med or orthopedics.  Off work for now. See letter.  Be seen promptly if symptoms acutely worsen   Plan: Orthopedic  Referral               I reviewed the patient's medications, allergies, medical history, family history, and social history.    Iain Moura MD

## 2022-10-27 ENCOUNTER — TELEPHONE (OUTPATIENT)
Dept: ORTHOPEDICS | Facility: CLINIC | Age: 37
End: 2022-10-27

## 2022-10-27 ENCOUNTER — OFFICE VISIT (OUTPATIENT)
Dept: ORTHOPEDICS | Facility: CLINIC | Age: 37
End: 2022-10-27
Attending: FAMILY MEDICINE
Payer: OTHER MISCELLANEOUS

## 2022-10-27 VITALS — WEIGHT: 290 LBS | HEIGHT: 71 IN | BODY MASS INDEX: 40.6 KG/M2

## 2022-10-27 DIAGNOSIS — S82.55XA CLOSED NONDISPLACED FRACTURE OF MEDIAL MALLEOLUS OF LEFT TIBIA, INITIAL ENCOUNTER: ICD-10-CM

## 2022-10-27 DIAGNOSIS — S52.124A CLOSED NONDISPLACED FRACTURE OF HEAD OF RIGHT RADIUS, INITIAL ENCOUNTER: Primary | ICD-10-CM

## 2022-10-27 PROCEDURE — 24650 CLTX RDL HEAD/NCK FX WO MNPJ: CPT | Performed by: FAMILY MEDICINE

## 2022-10-27 PROCEDURE — 99203 OFFICE O/P NEW LOW 30 MIN: CPT | Mod: 57 | Performed by: FAMILY MEDICINE

## 2022-10-27 RX ORDER — NABUMETONE 500 MG/1
500 TABLET, FILM COATED ORAL 2 TIMES DAILY
Qty: 30 TABLET | Refills: 1 | Status: SHIPPED | OUTPATIENT
Start: 2022-10-27

## 2022-10-27 ASSESSMENT — PAIN SCALES - GENERAL: PAINLEVEL: MODERATE PAIN (5)

## 2022-10-27 NOTE — LETTER
10/27/2022         RE: Stefania Ashton  [address]  [address]  [facility] [address]        Dear Colleague,    Thank you for referring your patient, Stefania Ashton, to the [facility]. Please see a copy of my visit note below.    ASSESSMENT & PLAN    Stefania was seen today for pain.    Diagnoses and all orders for this visit:    Closed nondisplaced fracture of head of right radius, initial encounter  -     Orthopedic  Referral  -     nabumetone (RELAFEN) 500 MG tablet; Take 1 tablet (500 mg) by mouth 2 times daily    Closed nondisplaced fracture of medial malleolus of left tibia, initial encounter  -     nabumetone (RELAFEN) 500 MG tablet; Take 1 tablet (500 mg) by mouth 2 times daily      This issue is acute and Improving.    # Right Radial Head Fracture: Notable after a fall on 10/25/22 while at work with acute pain over the right elbow and limited range of motion. She does have tenderness palpation over the radial head with limited range of motion on examination. Reviewed previous x-rays showing nondisplaced radial head fracture. Pain improved and sling. Given this will have her continue this and follow-up in one week for repeat evaluation and x-rays.    # Left Ankle Injury: Patient noting acute left ankle pain after same injury with pain over the medial ankle. She does have tenderness palpation over the medial malleolus with previous x-rays showing concern for an avulsion fracture over the medial malleolus. Given pain limiting ability to go up and down stairs and she lives in apartment on the third floor without an elevator will treat as  below and follow-up in one week for repeat evaluation x-rays. Plan otherwise as below.    Image Findings: reviewed previous x-rays showing nondisplaced radial head fracture on the right and avulsion fracture the medial malleolus on the ankle on the left  Treatment: Activities as tolerated, continue sling for more week, CAM  "boot given for patient today home exercises given today  Job: letter written for no work for one more week  Medications/Injections: Limited tylenol/ibuprofen for pain for 1-2 weeks, none  Follow-up: one week for repeat evaluation x-rays       Mushtaq Caraballo MD  [facility]    -----  Chief Complaint   Patient presents with     Right Elbow - Pain       SUBJECTIVE  Stefania Ashton is a/an 37 year old female who is seen as a self referral for evaluation of right elbow injury.     The patient is seen by themselves.  The patient is Right handed    Onset: 10/25/22, 2 day(s) ago. Patient describes injury as missed curb at work and fell onto right side. Patient presented to [facility] ED 10/25/22 and xrays were performed.  Patient followed up with PCP 10/26/22 and right humerus, hand and forearm was performed.   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      REVIEW OF SYSTEMS:  Review of Systems  Constitutional, HEENT, cardiovascular, pulmonary, GI, , musculoskeletal, neuro, skin, endocrine and psych systems are negative, except as otherwise noted.    OBJECTIVE:  Ht 1.803 m (5' 11\")   Wt 131.5 kg (290 lb)   BMI 40.45 kg/m     General: healthy, alert and in no distress  HEENT: no scleral icterus or conjunctival erythema  Skin: no suspicious lesions or rash. No jaundice.  CV: distal perfusion intact    Resp: normal respiratory effort without conversational dyspnea   Psych: normal mood and affect  Gait: normal steady gait with appropriate coordination and balance    Neuro: Normal light sensory exam of right upper and left lower extremities    Ortho Exam   LEFT ANKLE  Inspection:  Mild " ankle swelling  Palpation:    Tender about the medial malleolus. Remainder of bony and ligamentous landmarks are nontender.  Range of Motion:     Plantarflexion limited slightly by pain / dorsiflexion limited slightly by pain / inversion limited slightly by pain / eversion limited slightly by pain  Strength:    full  Special Tests:    negative anterior drawer, negative talar tilt, negative valgus stress, negative forced external rotation/eversion, negative Lemon sign, negative squeeze test.      RIGHT ELBOW  Inspection:    No swelling, bruising, discoloration, or obvious deformity or asymmetry  Palpation:    Tender about the radial head/neck. Remainder of bony, ligamentous and tendinous landmarks are nontender.    Crepitus is Absent  Range of Motion:    Range of motion limited 2/2 pain  Strength:    Distal strength/sensation in the m/r/u distributions on the right side  Special Tests:  Deferred special testing 2/2 known fracture    RADIOLOGY:  I independently, visualized and reviewed these images with the patient    Reviewed previous x-rays showing nondisplaced radial head fracture and concern for left medial malleolus avulsion fracture of the ankle       INDICATION: Pain of right forearm.  COMPARISON: None.                                                                      IMPRESSION: The radius and ulna appear normal. The elbow cannot be assessed because of obliquity. Elbow joint effusion cannot be ruled out. If there are symptoms in the elbow region, dedicated elbow radiographs would be recommended.    IMPRESSION: Normal joint spaces and alignment. No fracture.    IMPRESSION: There is a mildly displaced intra-articular fracture of the radial head/neck and there is an elbow joint effusion. The humerus appears normal.      Review of external notes as documented elsewhere in note  Review of the result(s) of each unique test - right elbow, hand, left ankle x-rays       Disclaimer: This note consists of symbols  derived from keyboarding, dictation and/or voice recognition software. As a result, there may be errors in the script that have gone undetected. Please consider this when interpreting information found in this chart.        Again, thank you for allowing me to participate in the care of your patient.        Sincerely,        Mushtaq Caraballo MD

## 2022-10-27 NOTE — PATIENT INSTRUCTIONS
# Right Radial Head Fracture: Notable after a fall on 10/25/22 while at work with acute pain over the right elbow and limited range of motion. She does have tenderness palpation over the radial head with limited range of motion on examination. Reviewed previous x-rays showing nondisplaced radial head fracture. Pain improved and sling. Given this will have her continue this and follow-up in one week for repeat evaluation and x-rays.    # Left Ankle Injury: Patient noting acute left ankle pain after same injury with pain over the medial ankle. She does have tenderness palpation over the medial malleolus with previous x-rays showing concern for an avulsion fracture over the medial malleolus. Given pain limiting ability to go up and down stairs and she lives in apartment on the third floor without an elevator will treat as  below and follow-up in one week for repeat evaluation x-rays. Plan otherwise as below.    Image Findings: reviewed previous x-rays showing nondisplaced radial head fracture on the right and avulsion fracture the medial malleolus on the ankle on the left  Treatment: Activities as tolerated, continue sling for more week, CAM boot given for patient today home exercises given today  Job: letter written for no work for one more week  Medications/Injections: Limited tylenol/ibuprofen for pain for 1-2 weeks, none  Follow-up: one week for repeat evaluation x-rays    Please call [phone]   Ask for my team if you have any questions or concerns    If you have not yet received the influenza vaccine but would like to get one, please call  [phone] or you can schedule via ePrep    It was great seeing you today!    Mushtaq Caraballo MD, [facility]

## 2022-10-27 NOTE — TELEPHONE ENCOUNTER
10/27-M for patient to schedule an appointment in the sports clinic, gave her times and dates.-LN

## 2022-10-27 NOTE — PROGRESS NOTES
ASSESSMENT & PLAN    Stefania was seen today for pain.    Diagnoses and all orders for this visit:    Closed nondisplaced fracture of head of right radius, initial encounter  -     Orthopedic  Referral  -     nabumetone (RELAFEN) 500 MG tablet; Take 1 tablet (500 mg) by mouth 2 times daily    Closed nondisplaced fracture of medial malleolus of left tibia, initial encounter  -     nabumetone (RELAFEN) 500 MG tablet; Take 1 tablet (500 mg) by mouth 2 times daily      This issue is acute and Improving.    # Right Radial Head Fracture: Notable after a fall on 10/25/22 while at work with acute pain over the right elbow and limited range of motion. She does have tenderness palpation over the radial head with limited range of motion on examination. Reviewed previous x-rays showing nondisplaced radial head fracture. Pain improved and sling. Given this will have her continue this and follow-up in one week for repeat evaluation and x-rays.    # Left Ankle Injury: Patient noting acute left ankle pain after same injury with pain over the medial ankle. She does have tenderness palpation over the medial malleolus with previous x-rays showing concern for an avulsion fracture over the medial malleolus. Given pain limiting ability to go up and down stairs and she lives in apartment on the third floor without an elevator will treat as  below and follow-up in one week for repeat evaluation x-rays. Plan otherwise as below.    Image Findings: reviewed previous x-rays showing nondisplaced radial head fracture on the right and avulsion fracture the medial malleolus on the ankle on the left  Treatment: Activities as tolerated, continue sling for more week, CAM boot given for patient today home exercises given today  Job: letter written for no work for one more week  Medications/Injections: Limited tylenol/ibuprofen for pain for 1-2 weeks, none  Follow-up: one week for repeat evaluation x-rays       Mushtaq Caraballo MD  [facility]  "[facility]    -----  Chief Complaint   Patient presents with     Right Elbow - Pain       SUBJECTIVE  Stefania Ashton is a/an 37 year old female who is seen as a self referral for evaluation of right elbow injury.     The patient is seen by themselves.  The patient is Right handed    Onset: 10/25/22, 2 day(s) ago. Patient describes injury as missed curb at work and fell onto right side. Patient presented to [facility] ED 10/25/22 and xrays were performed.  Patient followed up with PCP 10/26/22 and right humerus, hand and forearm was performed.   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      REVIEW OF SYSTEMS:  Review of Systems  Constitutional, HEENT, cardiovascular, pulmonary, GI, , musculoskeletal, neuro, skin, endocrine and psych systems are negative, except as otherwise noted.    OBJECTIVE:  Ht 1.803 m (5' 11\")   Wt 131.5 kg (290 lb)   BMI 40.45 kg/m     General: healthy, alert and in no distress  HEENT: no scleral icterus or conjunctival erythema  Skin: no suspicious lesions or rash. No jaundice.  CV: distal perfusion intact    Resp: normal respiratory effort without conversational dyspnea   Psych: normal mood and affect  Gait: normal steady gait with appropriate coordination and balance    Neuro: Normal light sensory exam of right upper and left lower extremities    Ortho Exam   LEFT ANKLE  Inspection:  Mild ankle swelling  Palpation:    Tender about the medial malleolus. Remainder of bony and ligamentous landmarks are nontender.  Range of Motion:     Plantarflexion limited slightly by pain / dorsiflexion limited slightly by pain / inversion limited slightly by pain / eversion " limited slightly by pain  Strength:    full  Special Tests:    negative anterior drawer, negative talar tilt, negative valgus stress, negative forced external rotation/eversion, negative Lemon sign, negative squeeze test.      RIGHT ELBOW  Inspection:    No swelling, bruising, discoloration, or obvious deformity or asymmetry  Palpation:    Tender about the radial head/neck. Remainder of bony, ligamentous and tendinous landmarks are nontender.    Crepitus is Absent  Range of Motion:    Range of motion limited 2/2 pain  Strength:    Distal strength/sensation in the m/r/u distributions on the right side  Special Tests:  Deferred special testing 2/2 known fracture    RADIOLOGY:  I independently, visualized and reviewed these images with the patient    Reviewed previous x-rays showing nondisplaced radial head fracture and concern for left medial malleolus avulsion fracture of the ankle       INDICATION: Pain of right forearm.  COMPARISON: None.                                                                      IMPRESSION: The radius and ulna appear normal. The elbow cannot be assessed because of obliquity. Elbow joint effusion cannot be ruled out. If there are symptoms in the elbow region, dedicated elbow radiographs would be recommended.    IMPRESSION: Normal joint spaces and alignment. No fracture.    IMPRESSION: There is a mildly displaced intra-articular fracture of the radial head/neck and there is an elbow joint effusion. The humerus appears normal.      Review of external notes as documented elsewhere in note  Review of the result(s) of each unique test - right elbow, hand, left ankle x-rays       Disclaimer: This note consists of symbols derived from keyboarding, dictation and/or voice recognition software. As a result, there may be errors in the script that have gone undetected. Please consider this when interpreting information found in this chart.

## 2022-10-27 NOTE — LETTER
[facility]  [address]  [place]  [address]  [phone]          10/27/2022    Stefania Ashton  [address]    [place] [address]  [phone] (home)     :     1985      To Whom it May Concern:    This patient was seen today for right elbow fracture and left ankle fracture after a fall at work. She should not work until follow-up in one week.    Please contact me for questions or concerns.    Sincerely,    Mushtaq Caraballo MD

## 2022-11-02 ENCOUNTER — ANCILLARY PROCEDURE (OUTPATIENT)
Dept: MRI IMAGING | Facility: CLINIC | Age: 37
End: 2022-11-02
Attending: PSYCHIATRY & NEUROLOGY
Payer: COMMERCIAL

## 2022-11-02 DIAGNOSIS — G35 MS (MULTIPLE SCLEROSIS) (H): ICD-10-CM

## 2022-11-02 PROCEDURE — 70553 MRI BRAIN STEM W/O & W/DYE: CPT | Performed by: RADIOLOGY

## 2022-11-02 PROCEDURE — A9585 GADOBUTROL INJECTION: HCPCS | Performed by: RADIOLOGY

## 2022-11-02 PROCEDURE — 72156 MRI NECK SPINE W/O & W/DYE: CPT | Mod: GC | Performed by: RADIOLOGY

## 2022-11-02 RX ORDER — GADOBUTROL 604.72 MG/ML
7.5 INJECTION INTRAVENOUS ONCE
Status: COMPLETED | OUTPATIENT
Start: 2022-11-02 | End: 2022-11-02

## 2022-11-02 RX ADMIN — GADOBUTROL 7.5 ML: 604.72 INJECTION INTRAVENOUS at 11:58

## 2022-11-02 RX ADMIN — GADOBUTROL 5.5 ML: 604.72 INJECTION INTRAVENOUS at 11:58

## 2022-11-03 ENCOUNTER — OFFICE VISIT (OUTPATIENT)
Dept: ORTHOPEDICS | Facility: CLINIC | Age: 37
End: 2022-11-03
Payer: OTHER MISCELLANEOUS

## 2022-11-03 ENCOUNTER — ANCILLARY PROCEDURE (OUTPATIENT)
Dept: GENERAL RADIOLOGY | Facility: CLINIC | Age: 37
End: 2022-11-03
Attending: FAMILY MEDICINE
Payer: COMMERCIAL

## 2022-11-03 VITALS — HEIGHT: 71 IN | BODY MASS INDEX: 40.6 KG/M2 | WEIGHT: 290 LBS

## 2022-11-03 DIAGNOSIS — S82.55XA CLOSED NONDISPLACED FRACTURE OF MEDIAL MALLEOLUS OF LEFT TIBIA, INITIAL ENCOUNTER: ICD-10-CM

## 2022-11-03 DIAGNOSIS — S52.124A CLOSED NONDISPLACED FRACTURE OF HEAD OF RIGHT RADIUS, INITIAL ENCOUNTER: ICD-10-CM

## 2022-11-03 DIAGNOSIS — S82.55XA CLOSED NONDISPLACED FRACTURE OF MEDIAL MALLEOLUS OF LEFT TIBIA, INITIAL ENCOUNTER: Primary | ICD-10-CM

## 2022-11-03 PROCEDURE — 73080 X-RAY EXAM OF ELBOW: CPT | Mod: TC | Performed by: RADIOLOGY

## 2022-11-03 PROCEDURE — 99207 PR FRACTURE CARE IN GLOBAL PERIOD: CPT | Performed by: FAMILY MEDICINE

## 2022-11-03 PROCEDURE — 73610 X-RAY EXAM OF ANKLE: CPT | Mod: TC | Performed by: RADIOLOGY

## 2022-11-03 PROCEDURE — 99213 OFFICE O/P EST LOW 20 MIN: CPT | Mod: 24 | Performed by: FAMILY MEDICINE

## 2022-11-03 NOTE — LETTER
11/3/2022         RE: Stefania Ashton  [address]  [address]  [place] [address]        Dear Colleague,    Thank you for referring your patient, Stefania Ashton, to the [facility]. Please see a copy of my visit note below.    ASSESSMENT & PLAN    Stefania was seen today for pain and pain.    Diagnoses and all orders for this visit:    Closed nondisplaced fracture of medial malleolus of left tibia, initial encounter  -     XR Ankle Left G/E 3 Views; Future    Closed nondisplaced fracture of head of right radius, initial encounter  -     XR Elbow RT G/E 3 vw; Future      # Right Radial Head Fracture: Notable after a fall on 10/25/22 while at work with acute pain over the right elbow and limited range of motion. Patient noted improved pain over the right lateral elbow but does have limitations in range of motion and notable popping with her fifth digit movement. Repeat x-rays today showing non-displaced radial head fracture. Given this will have her transition out of sling, work on range of motion and follow-up in two weeks. May consider hand therapy referral if stiffness persisting.       # Left Ankle Injury: Patient noting acute left ankle pain after same injury with pain over the medial ankle. She does has some tenderness palpation over the medial malleolus with previous x-rays showing concern for an avulsion fracture over the medial malleolus not well seen on repeat x-rays today. Pain improving today. Given this plan to treat as below and follow-up in two weeks.     Image Findings: Stable radial head fracture, no fracture noted  Treatment: Activities as tolerated, transition out of sling, PT for left ankle and hand therapy for radial head fracture ordered today  Job: letter written for no work for  two weeks  Medications/Injections: Limited tylenol/ibuprofen for pain for 1-2 weeks, none  Follow-up: two weeks for repeat evaluation x-rays    -----    SUBJECTIVE:  Stefania  "AUGUSTO Ashton is a 37 year old female who is seen in follow-up for right elbow and left ankle pain.They were last seen 10/27/2022.  DOI 10/25/22, now 9 days out.  The patient is seen by themselves.    Since their last visit reports improved elbow and ankle pain.  They indicate that their current pain level is 5/10. They have tried rest, sling, Relafen.        Patient's past medical, surgical, social, and family histories were reviewed today and no changes are noted.    REVIEW OF SYSTEMS:  Constitutional: NEGATIVE for fever, chills, change in weight  Skin: NEGATIVE for worrisome rashes, moles or lesions  GI/: NEGATIVE for bowel or bladder changes  Neuro: NEGATIVE for weakness, dizziness or paresthesias    OBJECTIVE:  Ht 1.803 m (5' 11\")   Wt 131.5 kg (290 lb)   BMI 40.45 kg/m     General: healthy, alert and in no distress  HEENT: no scleral icterus or conjunctival erythema  Skin: no suspicious lesions or rash. No jaundice.  CV: regular rhythm by palpation, no pedal edema  Resp: normal respiratory effort without conversational dyspnea   Psych: normal mood and affect  Gait: normal steady gait with appropriate coordination and balance  Neuro: normal light touch sensory exam of the extremities.    MSK:    Ortho Exam   LEFT ANKLE  Inspection:  Improved ankle swelling  Palpation:    Tender about the medial malleolus/anterior ankle joint. Remainder of bony and ligamentous landmarks are nontender.  Range of Motion:     Plantarflexion limited slightly by pain / dorsiflexion limited slightly by pain / inversion limited slightly by pain / eversion limited slightly by pain  Strength:    full  Special Tests:    negative anterior drawer, negative talar tilt, negative valgus stress, negative forced external rotation/eversion, negative Lemon sign, negative squeeze test.       RIGHT ELBOW  Inspection:    No swelling, bruising, discoloration, or obvious deformity or asymmetry  Palpation:    Tender about the radial head/neck. " Remainder of bony, ligamentous and tendinous landmarks are nontender.    Crepitus is Absent  Range of Motion:    30 ext, 90 flex  Strength:    Distal strength/sensation in the m/r/u distributions on the right side  Special Tests:  Deferred special testing 2/2 known fracture       Independent visualization of the below image:    IMPRESSION:  1.  Normal left ankle joint spacing and alignment.  2.  No acute or healing fracture is evident.  3.  Soft tissue swelling about the ankle.     HOA LINDA MD      IMPRESSION:  1.  Nondisplaced fracture of the right radius neck.  2.  Possible tiny nondisplaced fracture of the ulna coronoid process.  3.  Normal elbow joint spacing and alignment.  4.  No significant joint effusion.     MD Mushtaq CONNOLLY MD, [facility]    Disclaimer: This note consists of symbols derived from keyboarding, dictation and/or voice recognition software. As a result, there may be errors in the script that have gone undetected. Please consider this when interpreting information found in this chart.          Again, thank you for allowing me to participate in the care of your patient.        Sincerely,        Mushtaq Caraballo MD

## 2022-11-03 NOTE — LETTER
[facility]  [address]  [place]  [address]  [phone]          11/3/2022      Stefania Ashton  [address]    [place] [address]  [phone] (home)     :     1985      To Whom it May Concern:    This patient was seen today, 11/3/22 for right elbow fracture and left ankle fracture after a fall at work. She should not work until follow-up in two weeks.    Please contact me for questions or concerns.    Sincerely,    Mushtaq Caraballo MD

## 2022-11-03 NOTE — PROGRESS NOTES
ASSESSMENT & PLAN    Stefania was seen today for pain and pain.    Diagnoses and all orders for this visit:    Closed nondisplaced fracture of medial malleolus of left tibia, initial encounter  -     XR Ankle Left G/E 3 Views; Future    Closed nondisplaced fracture of head of right radius, initial encounter  -     XR Elbow RT G/E 3 vw; Future      # Right Radial Head Fracture: Notable after a fall on 10/25/22 while at work with acute pain over the right elbow and limited range of motion. Patient noted improved pain over the right lateral elbow but does have limitations in range of motion and notable popping with her fifth digit movement. Repeat x-rays today showing non-displaced radial head fracture. Given this will have her transition out of sling, work on range of motion and follow-up in two weeks. May consider hand therapy referral if stiffness persisting.       # Left Ankle Injury: Patient noting acute left ankle pain after same injury with pain over the medial ankle. She does has some tenderness palpation over the medial malleolus with previous x-rays showing concern for an avulsion fracture over the medial malleolus not well seen on repeat x-rays today. Pain improving today. Given this plan to treat as below and follow-up in two weeks.     Image Findings: Stable radial head fracture, no fracture noted  Treatment: Activities as tolerated, transition out of sling, PT for left ankle and hand therapy for radial head fracture ordered today  Job: letter written for no work for  two weeks  Medications/Injections: Limited tylenol/ibuprofen for pain for 1-2 weeks, none  Follow-up: two weeks for repeat evaluation x-rays    -----    SUBJECTIVE:  Stefania Asthon is a 37 year old female who is seen in follow-up for right elbow and left ankle pain.They were last seen 10/27/2022.  DOI 10/25/22, now 9 days out.  The patient is seen by themselves.    Since their last visit reports improved elbow and ankle pain.  They indicate  "that their current pain level is 5/10. They have tried rest, sling, Relafen.        Patient's past medical, surgical, social, and family histories were reviewed today and no changes are noted.    REVIEW OF SYSTEMS:  Constitutional: NEGATIVE for fever, chills, change in weight  Skin: NEGATIVE for worrisome rashes, moles or lesions  GI/: NEGATIVE for bowel or bladder changes  Neuro: NEGATIVE for weakness, dizziness or paresthesias    OBJECTIVE:  Ht 1.803 m (5' 11\")   Wt 131.5 kg (290 lb)   BMI 40.45 kg/m     General: healthy, alert and in no distress  HEENT: no scleral icterus or conjunctival erythema  Skin: no suspicious lesions or rash. No jaundice.  CV: regular rhythm by palpation, no pedal edema  Resp: normal respiratory effort without conversational dyspnea   Psych: normal mood and affect  Gait: normal steady gait with appropriate coordination and balance  Neuro: normal light touch sensory exam of the extremities.    MSK:    Ortho Exam   LEFT ANKLE  Inspection:  Improved ankle swelling  Palpation:    Tender about the medial malleolus/anterior ankle joint. Remainder of bony and ligamentous landmarks are nontender.  Range of Motion:     Plantarflexion limited slightly by pain / dorsiflexion limited slightly by pain / inversion limited slightly by pain / eversion limited slightly by pain  Strength:    full  Special Tests:    negative anterior drawer, negative talar tilt, negative valgus stress, negative forced external rotation/eversion, negative Lemon sign, negative squeeze test.       RIGHT ELBOW  Inspection:    No swelling, bruising, discoloration, or obvious deformity or asymmetry  Palpation:    Tender about the radial head/neck. Remainder of bony, ligamentous and tendinous landmarks are nontender.    Crepitus is Absent  Range of Motion:    30 ext, 90 flex  Strength:    Distal strength/sensation in the m/r/u distributions on the right side  Special Tests:  Deferred special testing 2/2 known " fracture       Independent visualization of the below image:    IMPRESSION:  1.  Normal left ankle joint spacing and alignment.  2.  No acute or healing fracture is evident.  3.  Soft tissue swelling about the ankle.     HOA LINDA MD      IMPRESSION:  1.  Nondisplaced fracture of the right radius neck.  2.  Possible tiny nondisplaced fracture of the ulna coronoid process.  3.  Normal elbow joint spacing and alignment.  4.  No significant joint effusion.     MD Mushtaq CONNOLLY MD, [facility]    Disclaimer: This note consists of symbols derived from keyboarding, dictation and/or voice recognition software. As a result, there may be errors in the script that have gone undetected. Please consider this when interpreting information found in this chart.

## 2022-11-03 NOTE — PATIENT INSTRUCTIONS
# Right Radial Head Fracture: Notable after a fall on 10/25/22 while at work with acute pain over the right elbow and limited range of motion. Patient noted improved pain over the right lateral elbow but does have limitations in range of motion and notable popping with her fifth digit movement. Repeat x-rays today showing non-displaced radial head fracture. Given this will have her transition out of sling, work on range of motion and follow-up in two weeks. May consider hand therapy referral if stiffness persisting.       # Left Ankle Injury: Patient noting acute left ankle pain after same injury with pain over the medial ankle. She does has some tenderness palpation over the medial malleolus with previous x-rays showing concern for an avulsion fracture over the medial malleolus not well seen on repeat x-rays today. Pain improving today. Given this plan to treat as below and follow-up in two weeks.     Image Findings: Stable radial head fracture, no fracture noted  Treatment: Activities as tolerated, transition out of sling, PT for left ankle and hand therapy for radial head fracture ordered today  Job: letter written for no work for  two weeks  Medications/Injections: Limited tylenol/ibuprofen for pain for 1-2 weeks, none  Follow-up: two weeks for repeat evaluation x-rays    It was great seeing you again today!    Mushtaq Caraballo

## 2022-11-04 ENCOUNTER — TELEPHONE (OUTPATIENT)
Dept: ORTHOPEDICS | Facility: CLINIC | Age: 37
End: 2022-11-04

## 2022-11-04 NOTE — TELEPHONE ENCOUNTER
Patient would like for careteam to fax over notes stating that patient is clear to return to work in 2 weeks for work comp. FAX# [phone] Please reach out to patient for questions.

## 2022-11-07 ENCOUNTER — OFFICE VISIT (OUTPATIENT)
Dept: NEUROLOGY | Facility: CLINIC | Age: 37
End: 2022-11-07
Payer: COMMERCIAL

## 2022-11-07 ENCOUNTER — THERAPY VISIT (OUTPATIENT)
Dept: PHYSICAL THERAPY | Facility: CLINIC | Age: 37
End: 2022-11-07
Payer: OTHER MISCELLANEOUS

## 2022-11-07 ENCOUNTER — TELEPHONE (OUTPATIENT)
Dept: NEUROLOGY | Facility: CLINIC | Age: 37
End: 2022-11-07

## 2022-11-07 VITALS
HEART RATE: 78 BPM | OXYGEN SATURATION: 100 % | SYSTOLIC BLOOD PRESSURE: 135 MMHG | WEIGHT: 290 LBS | DIASTOLIC BLOOD PRESSURE: 90 MMHG | BODY MASS INDEX: 40.6 KG/M2 | HEIGHT: 71 IN

## 2022-11-07 DIAGNOSIS — S82.55XA CLOSED NONDISPLACED FRACTURE OF MEDIAL MALLEOLUS OF LEFT TIBIA, INITIAL ENCOUNTER: ICD-10-CM

## 2022-11-07 DIAGNOSIS — E55.9 VITAMIN D DEFICIENCY: ICD-10-CM

## 2022-11-07 DIAGNOSIS — G35 MS (MULTIPLE SCLEROSIS) (H): Primary | ICD-10-CM
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
## 2022-11-07 LAB
ALBUMIN SERPL-MCNC: 3.3 G/DL (ref 3.4–5)
ALP SERPL-CCNC: 92 U/L (ref 40–150)
ALT SERPL W P-5'-P-CCNC: 15 U/L (ref 0–50)
AST SERPL W P-5'-P-CCNC: 15 U/L (ref 0–45)
BASOPHILS # BLD AUTO: 0 10E3/UL (ref 0–0.2)
BASOPHILS NFR BLD AUTO: 0 %
BILIRUB DIRECT SERPL-MCNC: <0.1 MG/DL (ref 0–0.2)
BILIRUB SERPL-MCNC: 0.3 MG/DL (ref 0.2–1.3)
EOSINOPHIL # BLD AUTO: 0.1 10E3/UL (ref 0–0.7)
EOSINOPHIL NFR BLD AUTO: 2 %
ERYTHROCYTE [DISTWIDTH] IN BLOOD BY AUTOMATED COUNT: 16 % (ref 10–15)
HCT VFR BLD AUTO: 35.6 % (ref 35–47)
HGB BLD-MCNC: 11.3 G/DL (ref 11.7–15.7)
IMM GRANULOCYTES # BLD: 0 10E3/UL
IMM GRANULOCYTES NFR BLD: 0 %
LYMPHOCYTES # BLD AUTO: 1.5 10E3/UL (ref 0.8–5.3)
LYMPHOCYTES NFR BLD AUTO: 29 %
MCH RBC QN AUTO: 22.2 PG (ref 26.5–33)
MCHC RBC AUTO-ENTMCNC: 31.7 G/DL (ref 31.5–36.5)
MCV RBC AUTO: 70 FL (ref 78–100)
MONOCYTES # BLD AUTO: 0.6 10E3/UL (ref 0–1.3)
MONOCYTES NFR BLD AUTO: 11 %
NEUTROPHILS # BLD AUTO: 3 10E3/UL (ref 1.6–8.3)
NEUTROPHILS NFR BLD AUTO: 58 %
NRBC # BLD AUTO: 0 10E3/UL
NRBC BLD AUTO-RTO: 0 /100
PLATELET # BLD AUTO: 298 10E3/UL (ref 150–450)
PROT SERPL-MCNC: 7.3 G/DL (ref 6.8–8.8)
RBC # BLD AUTO: 5.1 10E6/UL (ref 3.8–5.2)
WBC # BLD AUTO: 5.1 10E3/UL (ref 4–11)

## 2022-11-07 PROCEDURE — 99214 OFFICE O/P EST MOD 30 MIN: CPT | Mod: GC | Performed by: PSYCHIATRY & NEUROLOGY

## 2022-11-07 PROCEDURE — 82306 VITAMIN D 25 HYDROXY: CPT | Performed by: PSYCHIATRY & NEUROLOGY

## 2022-11-07 PROCEDURE — 97110 THERAPEUTIC EXERCISES: CPT | Mod: GP | Performed by: PHYSICAL THERAPIST

## 2022-11-07 PROCEDURE — 80076 HEPATIC FUNCTION PANEL: CPT | Performed by: PSYCHIATRY & NEUROLOGY

## 2022-11-07 PROCEDURE — 86769 SARS-COV-2 COVID-19 ANTIBODY: CPT | Mod: 90 | Performed by: PSYCHIATRY & NEUROLOGY

## 2022-11-07 PROCEDURE — 85025 COMPLETE CBC W/AUTO DIFF WBC: CPT | Performed by: PSYCHIATRY & NEUROLOGY

## 2022-11-07 PROCEDURE — 99000 SPECIMEN HANDLING OFFICE-LAB: CPT | Performed by: PSYCHIATRY & NEUROLOGY

## 2022-11-07 PROCEDURE — 36415 COLL VENOUS BLD VENIPUNCTURE: CPT | Performed by: PSYCHIATRY & NEUROLOGY

## 2022-11-07 PROCEDURE — 82784 ASSAY IGA/IGD/IGG/IGM EACH: CPT | Performed by: PSYCHIATRY & NEUROLOGY

## 2022-11-07 PROCEDURE — 97161 PT EVAL LOW COMPLEX 20 MIN: CPT | Mod: GP | Performed by: PHYSICAL THERAPIST

## 2022-11-07 NOTE — LETTER
11/7/2022      RE: Stefania Ashton  [address]  [address]  [place] [address]       Multiple Sclerosis Clinic Visit  11/07/2022    Reason: Multiple Sclerosis     Source of information: Patient and chart review    History of Present Symptom:  Stefania Ashton is a 37 year old female with a PMH significant for hypertension, hyperlipidemia, multiple sclerosis who presents today for follow up.     She had a fall while at work when she missed a large curb when stepping down. She had a small avulsion fracture of the left ankle and right radius. No other falls and balance is okay in general otherwise.     She was getting some reactions with the Ocrevus infusion with itching sensation. Blood pressure jumped up and she has some difficulty with speech/slurred speach. She received with a slower infusion rate and it has been going okay.     Intermittent left hand tingling, seems to be positional.     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   Previous disease modifying therapy:   Glatiramer acetate (radiologic progression)  Plegridy (radiologic progression)   Ocrelizumab started 2019, last dose 8/16/2022    -vitamin D 4000 international unit(s)     Symptom management:  Bowel/bladder changes: Urinary frequency. Getting up multiple times to urinate. Occasional incontinence, improved compared to before.   Gait/balance: No limitations on walking.     The patient's medical, surgical, social, and family history were personally reviewed with the patient.  Past Medical History:   Diagnosis Date     ASCUS with positive high risk HPV 2012     Bell's palsy      Cervical dysplasia 2012     Genital herpes      Hyperlipidemia LDL goal < 160      Hypertension goal BP (blood pressure) < 140/90      Major depression, single episode      Microcytic anemia      MS (multiple sclerosis) (H) 12/3/2015     Obesity 5/20/2014     Vitamin D deficiency       Past Surgical History:   Procedure Laterality  Date     DAVINCI THYMECTOMY N/A 2016    Procedure: DAVINCI THYMECTOMY;  Surgeon: Leighton Estrella MD;  Location:  OR     Social History     Tobacco Use     Smoking status: Former     Packs/day: 1.00     Years: 5.00     Pack years: 5.00     Types: Cigarettes     Start date: 1998     Quit date: 2009     Years since quittin.4     Smokeless tobacco: Never   Vaping Use     Vaping Use: Never used   Substance Use Topics     Alcohol use: Yes     Alcohol/week: 2.0 standard drinks     Types: 2 Glasses of wine per week     Comment: MONTHLY     Drug use: No     Comment: Had smoked marijuna in the past, last time in      Family History   Problem Relation Age of Onset     Congenital Anomalies Son         Down's     Hypertension Mother      Diabetes No family hx of      C.A.D. No family hx of      Cancer No family hx of      Cerebrovascular Disease Maternal Grandmother         CVA-     Current Outpatient Medications   Medication     cetirizine (ZYRTEC) 10 MG tablet     EPINEPHrine (EPIPEN/ADRENACLICK/OR ANY BX GENERIC EQUIV) 0.3 MG/0.3ML injection 2-pack     nabumetone (RELAFEN) 500 MG tablet     vitamin D3 (CHOLECALCIFEROL) 2000 units tablet     No current facility-administered medications for this visit.     Allergies   Allergen Reactions     Seasonal Allergies          Review of Systems:  14-point review of systems was completed. The pertinent positives and negatives are in the HPI.    Physical Examination     General: Patient appears comfortable in no acute distress.   HEENT: NC/AT, no icterus, moist mucous membranes  Chest: non-labored on RA  Extremities: Warm, no edema  Skin: No rash or lesion   Psych: Affect appropriate for situation   Neuro:  Mental status: Awake, alert, attentive. Language is fluent with intact comprehension of commands.  Cranial nerves: PERRL with no relative afferent pupillary defect, conjugate gaze, EOMI, visual fields intact, face symmetric, shoulder shrug  strong, tongue protrusion/uvula midline, no dysarthria.   Motor: Normal muscle bulk and tone.    R L  Deltoid  5 5  Biceps  5 5  Triceps  5 5  Wrist ext 5 5  Finger ext 5 5  Finger abd 5 5    Hip flexion 5 5  Knee flexion 5 5  Knee ext 5 5  Dorsiflexion 5 5    Reflexes: 2+ reflexes symmetric in biceps, brachioradialis, patellae, and achilles. No clonus, toes down-going.  Sensory: Intact to light touch, pin, vibration, and proprioception. Romberg is negative.   Coordination: FNF without ataxia or dysmetria.    Gait: Normal width, stride length, turn, with symmetric arm swing. Tandem walk intact.    Laboratory:  11/2021 1,288   Vit D 29     AST/ALT 17/17   CBC RESULTS: Recent Labs   Lab Test 11/01/21  0851   WBC 5.5   RBC 5.04   HGB 11.3*   HCT 35.1   MCV 70*   MCH 22.4*   MCHC 32.2   RDW 15.6*          Imaging:    MRI brain 11/2/2022  Impression:   1. The study demonstrates 15-20 foci of T2-hyperintensity within the  cerebral white matter consistent with the clinical suspicion of  demyelinating disease. There are no foci of abnormal enhancement noted  intracranially .   2. There is no significant interval change from the prior study.    MRI c-spine   IMPRESSION: No abnormal signal or enhancement throughout the cervical  spinal cord.    Assessment/Plan:  Stefania Ashton is a 37 year old female who presents for follow up for multiple sclerosis. She remains clinically and radiologically stable on Ocrevus. She tolerated this well with a slower infusion rate to limit itching reaction.     She unfortunately had a fall and avulsion fractures. In general she is getting around okay, this seems to be a one time difficulty.     We did discuss risk of covid with ocrevus and recommended vaccination and booster.     # multiple sclerosis   -blood work today (CBC, immunoglobulins, lfts vt D, covid ab)  -continue ocrevus (target date February)  -follow up 6 months       Patient seen and discussed with Dr. Peace.  I have  reviewed the plan with the patient, who is in agreement.      Clari Perez DO  Multiple Sclerosis Fellow    Attending physician: I saw and evaluated the patient with Dr. Perez, and I agree with her findings and plan of care as documented above.    The patient remains clinically and radiologically stable on ocrelizumab, and will continue this medication with the next infusion due to be performed in February 2023. Today, I will obtain routine laboratory studies for monitoring of this medication as above, including COVID-19 spike protein antibodies; if negative, I would advise that she receive the Evusheld antibody product for pre-exposure prophylaxis against COVID-19 infection.     Recent medical history is also pertinent for a fall with associated orthopedic injuries. This is not clearly related to her MS diagnosis, but we will continue to keep an eye on her gait stability over time.    Tico Peace MD   of Neurology  [facility]    Cc:  Porsha Veliz MD (PCP)  Tyron Caraballo MD (Sports Medicine)  Patient                Tico Peace MD

## 2022-11-07 NOTE — PATIENT INSTRUCTIONS
Blood tests today    2.  Proceed with next Ocrevus infusion in February as scheduled    3.  Return to clinic in 6 months

## 2022-11-07 NOTE — PROGRESS NOTES
Multiple Sclerosis Clinic Visit  11/07/2022    Reason: Multiple Sclerosis     Source of information: Patient and chart review    History of Present Symptom:  Stefania Ashton is a 37 year old female with a PMH significant for hypertension, hyperlipidemia, multiple sclerosis who presents today for follow up.     She had a fall while at work when she missed a large curb when stepping down. She had a small avulsion fracture of the left ankle and right radius. No other falls and balance is okay in general otherwise.     She was getting some reactions with the Ocrevus infusion with itching sensation. Blood pressure jumped up and she has some difficulty with speech/slurred speach. She received with a slower infusion rate and it has been going okay.     Intermittent left hand tingling, seems to be positional.     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   Previous disease modifying therapy:   Glatiramer acetate (radiologic progression)  Plegridy (radiologic progression)   Ocrelizumab started 2019, last dose 8/16/2022    -vitamin D 4000 international unit(s)     Symptom management:  Bowel/bladder changes: Urinary frequency. Getting up multiple times to urinate. Occasional incontinence, improved compared to before.   Gait/balance: No limitations on walking.     The patient's medical, surgical, social, and family history were personally reviewed with the patient.  Past Medical History:   Diagnosis Date    ASCUS with positive high risk HPV 2012    Bell's palsy     Cervical dysplasia 2012    Genital herpes     Hyperlipidemia LDL goal < 160     Hypertension goal BP (blood pressure) < 140/90     Major depression, single episode     Microcytic anemia     MS (multiple sclerosis) (H) 12/3/2015    Obesity 5/20/2014    Vitamin D deficiency       Past Surgical History:   Procedure Laterality Date    DAVINCI THYMECTOMY N/A 5/9/2016    Procedure: DAVINCI THYMECTOMY;  Surgeon: Tegan  Leighton Lim MD;  Location:  OR     Social History     Tobacco Use    Smoking status: Former     Packs/day: 1.00     Years: 5.00     Pack years: 5.00     Types: Cigarettes     Start date: 1998     Quit date: 2009     Years since quittin.4    Smokeless tobacco: Never   Vaping Use    Vaping Use: Never used   Substance Use Topics    Alcohol use: Yes     Alcohol/week: 2.0 standard drinks     Types: 2 Glasses of wine per week     Comment: MONTHLY    Drug use: No     Comment: Had smoked marijuna in the past, last time in      Family History   Problem Relation Age of Onset    Congenital Anomalies Son         Down's    Hypertension Mother     Diabetes No family hx of     C.A.D. No family hx of     Cancer No family hx of     Cerebrovascular Disease Maternal Grandmother         CVA-     Current Outpatient Medications   Medication    cetirizine (ZYRTEC) 10 MG tablet    EPINEPHrine (EPIPEN/ADRENACLICK/OR ANY BX GENERIC EQUIV) 0.3 MG/0.3ML injection 2-pack    nabumetone (RELAFEN) 500 MG tablet    vitamin D3 (CHOLECALCIFEROL) 2000 units tablet     No current facility-administered medications for this visit.     Allergies   Allergen Reactions    Seasonal Allergies          Review of Systems:  14-point review of systems was completed. The pertinent positives and negatives are in the HPI.    Physical Examination     General: Patient appears comfortable in no acute distress.   HEENT: NC/AT, no icterus, moist mucous membranes  Chest: non-labored on RA  Extremities: Warm, no edema  Skin: No rash or lesion   Psych: Affect appropriate for situation   Neuro:  Mental status: Awake, alert, attentive. Language is fluent with intact comprehension of commands.  Cranial nerves: PERRL with no relative afferent pupillary defect, conjugate gaze, EOMI, visual fields intact, face symmetric, shoulder shrug strong, tongue protrusion/uvula midline, no dysarthria.   Motor: Normal muscle bulk and  tone.    R L  Deltoid  5 5  Biceps  5 5  Triceps  5 5  Wrist ext 5 5  Finger ext 5 5  Finger abd 5 5    Hip flexion 5 5  Knee flexion 5 5  Knee ext 5 5  Dorsiflexion 5 5    Reflexes: 2+ reflexes symmetric in biceps, brachioradialis, patellae, and achilles. No clonus, toes down-going.  Sensory: Intact to light touch, pin, vibration, and proprioception. Romberg is negative.   Coordination: FNF without ataxia or dysmetria.    Gait: Normal width, stride length, turn, with symmetric arm swing. Tandem walk intact.    Laboratory:  11/2021 1,288   Vit D 29     AST/ALT 17/17   CBC RESULTS: Recent Labs   Lab Test 11/01/21  0851   WBC 5.5   RBC 5.04   HGB 11.3*   HCT 35.1   MCV 70*   MCH 22.4*   MCHC 32.2   RDW 15.6*          Imaging:    MRI brain 11/2/2022  Impression:   1. The study demonstrates 15-20 foci of T2-hyperintensity within the  cerebral white matter consistent with the clinical suspicion of  demyelinating disease. There are no foci of abnormal enhancement noted  intracranially .   2. There is no significant interval change from the prior study.    MRI c-spine   IMPRESSION: No abnormal signal or enhancement throughout the cervical  spinal cord.    Assessment/Plan:  Stefania Ashton is a 37 year old female who presents for follow up for multiple sclerosis. She remains clinically and radiologically stable on Ocrevus. She tolerated this well with a slower infusion rate to limit itching reaction.     She unfortunately had a fall and avulsion fractures. In general she is getting around okay, this seems to be a one time difficulty.     We did discuss risk of covid with ocrevus and recommended vaccination and booster.     # multiple sclerosis   -blood work today (CBC, immunoglobulins, lfts vt D, covid ab)  -continue ocrevus (target date February)  -follow up 6 months     Patient seen and discussed with Dr. Peace.  I have reviewed the plan with the patient, who is in agreement.      Clari Perez, DO  Multiple  Sclerosis Fellow    Attending physician: I saw and evaluated the patient with Dr. Perez, and I agree with her findings and plan of care as documented above.    The patient remains clinically and radiologically stable on ocrelizumab, and will continue this medication with the next infusion due to be performed in February 2023. Today, I will obtain routine laboratory studies for monitoring of this medication as above, including COVID-19 spike protein antibodies; if negative, I would advise that she receive the Evusheld antibody product for pre-exposure prophylaxis against COVID-19 infection.     Recent medical history is also pertinent for a fall with associated orthopedic injuries. This is not clearly related to her MS diagnosis, but we will continue to keep an eye on her gait stability over time.    Tico Peace MD   of Neurology  [facility]    Ccc:

## 2022-11-07 NOTE — PROGRESS NOTES
Physical Therapy Initial Evaluation  Subjective:  The history is provided by the patient and medical records. No  was used.   Therapist Generated HPI Evaluation  Problem details: Pt comes to therapy today for left ankle injury sustained when she missed a curb and twisted her ankle and landed on her right side with concurrent right shoulder pain and right elbow fracture. States that swelling and pain have been improving since the injury but still present with prolonged positioning, walking, and a lot with stairs. Was given a CAM boot to utilize but states that it does not fit so she has been using a brace and her shoe and this has gone ok so far. Goals are to improve pain levels with daily activities and walking/stairs. .         Type of problem:  Left ankle.    This is a new condition.  Condition occurred with:  A fall/slip.  Where condition occurred: at work.  Patient reports pain:  Anterior and lateral.  Pain is described as aching and sharp and is intermittent.  Pain radiates to:  No radiation. Pain is worse in the P.M..  Since onset symptoms are gradually improving.  Associated symptoms:  Loss of strength, loss of motion/stiffness and edema. Symptoms are exacerbated by ascending stairs, descending stairs, bending/squatting, walking and standing  and relieved by rest, ice, NSAID's and muscle relaxants.  Special tests included:  X-ray.  There was none improvement following previous treatment.  Restrictions due to condition include:  Currently not working due to present treatment.  Barriers include:  None as reported by patient.    Patient Health History  Stefania Ashton being seen for left ankle.     Problem began: 10/15/2022.   Problem occurred: trip off curb   Pain is reported as 4/10 on pain scale.  General health as reported by patient is good.  Pertinent medical history includes: none.   Red flags:  None as reported by patient.  Medical allergies: none.   Surgeries include:  None.     Current medications:  Muscle relaxants and anti-inflammatory.    Current occupation is para.   Primary job tasks include:  Computer work, lifting/carrying, prolonged sitting, driving and pushing/pulling.                                    Objective:    Gait:    Gait Type:  Antalgic   Assistive Devices:  None            Ankle/Foot Evaluation  ROM:    AROM:    Dorsiflexion: Left:   -4  Right:    Plantarflexion: Left:  WNL    Right:   Inversion: Left:  18     Right:   Eversion: 28     Right:       PROM:                Pain: pain end range all planes with AROM        SPECIAL TESTS: not assessed    PALPATION:   Left ankle tenderness present at:  gastroc/soleus and lateral malleolus                                                    Knee Evaluation:  ROM:  AROM: normal  Strength:  Normal                            General     ROS    Assessment/Plan:    Patient is a 37 year old female with left side ankle complaints.    Patient has the following significant findings with corresponding treatment plan.                Diagnosis 1:  Closed nondisplaced fracture of medial malleolus of left tibia, initial encounter  Pain -  hot/cold therapy, self management and education  Decreased ROM/flexibility - manual therapy and therapeutic exercise  Decreased joint mobility - manual therapy and therapeutic exercise  Decreased strength - therapeutic exercise and therapeutic activities  Impaired balance - neuro re-education and therapeutic activities  Edema - vasopneumatics and cold therapy  Impaired gait - gait training  Impaired muscle performance - neuro re-education  Decreased function - therapeutic activities    Therapy Evaluation Codes:   1) History comprised of:   Personal factors that impact the plan of care:      None.    Comorbidity factors that impact the plan of care are:      None.     Medications impacting care: None.  2) Examination of Body Systems comprised of:   Body structures and functions that impact the plan of care:       Ankle and Elbow.   Activity limitations that impact the plan of care are:      Cooking, Driving, Jumping, Lifting, Stairs, Standing, Walking and Sleeping.  3) Clinical presentation characteristics are:   Stable/Uncomplicated.  4) Decision-Making    Low complexity using standardized patient assessment instrument and/or measureable assessment of functional outcome.  Cumulative Therapy Evaluation is: Low complexity.    Previous and current functional limitations:  (See Goal Flow Sheet for this information)    Short term and Long term goals: (See Goal Flow Sheet for this information)     Communication ability:  Patient appears to be able to clearly communicate and understand verbal and written communication and follow directions correctly.  Treatment Explanation - The following has been discussed with the patient:   RX ordered/plan of care  Anticipated outcomes  Possible risks and side effects  This patient would benefit from PT intervention to resume normal activities.   Rehab potential is good.    Frequency:  1 X week, once daily  Duration:  for 6-8 weeks  Discharge Plan:  Achieve all LTG.  Independent in home treatment program.  Reach maximal therapeutic benefit.    Please refer to the daily flowsheet for treatment today, total treatment time and time spent performing 1:1 timed codes.

## 2022-11-07 NOTE — NURSING NOTE
"Stefania Ashton's goals for this visit include:   Chief Complaint   Patient presents with     RECHECK     6 month follow up         She requests these members of her care team be copied on today's visit information: yes    PCP: Porsha Veliz    Referring Provider:  No referring provider defined for this encounter.    BP (!) 135/90 (BP Location: Left arm, Patient Position: Sitting, Cuff Size: Adult Large)   Pulse 78   Ht 1.803 m (5' 11\")   Wt 131.5 kg (290 lb)   SpO2 100%   BMI 40.45 kg/m      Do you need any medication refills at today's visit? No  ELEUTERIO Waggoner, DANIKA ([facility])      "

## 2022-11-07 NOTE — Clinical Note
11/7/2022         RE: Stefania Ashton  [address]  [address]  [place] [address]        Dear Colleague,    Thank you for referring your patient, Stefania Ashton, to the [facility] [place]. Please see a copy of my visit note below.      Neurology Clinic Visit    Reason: Multiple Sclerosis       11/07/2022   Source of information: Patient and chart review    History of Present Symptom:  Stefania Ashton is a 37 year old female with a PMH significant for hypertension, hyperlipidemia, multiple sclerosis who presents today for follow up. ***       She had a fall while at work when she missed a large step stepping down. She had a small avulsion fracture of the right ankle and ulnar. No other falls and balance is okay otherwise.     She was getting some reactions with the infusion with itching sensation. Blood pressure jumped up and she has some difficulty with speech/slurred speach. She has been doing a slower infusion rate and it has been going okay.     Intermittent left hand tingling, may be positional.     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   Previous disease modifying therapy:   Glatiramer acetate (radiologic progression)  Plegridy (radiologic progression)   Ocrelizumab started 2019, last dose 8/16/2022    -vitamin D 4000 international unit(s)     Symptom management:  Bowel/bladder changes: Urinary frequency. Getting up multiple times to urinate. Occasional incontinence, improved.   Gait/balance: No limitations on walking.     The patient's medical, surgical, social, and family history were personally reviewed with the patient.  Past Medical History:   Diagnosis Date     ASCUS with positive high risk HPV 2012     Bell's palsy      Cervical dysplasia 2012     Genital herpes      Hyperlipidemia LDL goal < 160      Hypertension goal BP (blood pressure) < 140/90      Major depression, single episode      Microcytic anemia      MS  (multiple sclerosis) (H) 12/3/2015     Obesity 2014     Vitamin D deficiency       Past Surgical History:   Procedure Laterality Date     DAVINCI THYMECTOMY N/A 2016    Procedure: DAVINCI THYMECTOMY;  Surgeon: Leighton Estrella MD;  Location:  OR     Social History     Tobacco Use     Smoking status: Former     Packs/day: 1.00     Years: 5.00     Pack years: 5.00     Types: Cigarettes     Start date: 1998     Quit date: 2009     Years since quittin.4     Smokeless tobacco: Never   Vaping Use     Vaping Use: Never used   Substance Use Topics     Alcohol use: Yes     Alcohol/week: 2.0 standard drinks     Types: 2 Glasses of wine per week     Comment: MONTHLY     Drug use: No     Comment: Had smoked marijuna in the past, last time in      Family History   Problem Relation Age of Onset     Congenital Anomalies Son         Down's     Hypertension Mother      Diabetes No family hx of      C.A.D. No family hx of      Cancer No family hx of      Cerebrovascular Disease Maternal Grandmother         CVA-     Current Outpatient Medications   Medication     cetirizine (ZYRTEC) 10 MG tablet     EPINEPHrine (EPIPEN/ADRENACLICK/OR ANY BX GENERIC EQUIV) 0.3 MG/0.3ML injection 2-pack     nabumetone (RELAFEN) 500 MG tablet     vitamin D3 (CHOLECALCIFEROL) 2000 units tablet     No current facility-administered medications for this visit.     Allergies   Allergen Reactions     Seasonal Allergies          Review of Systems:  14-point review of systems was completed. The pertinent positives and negatives are in the HPI.    Physical Examination   Vitals: There were no vitals taken for this visit.  General: Patient appears comfortable in no acute distress.   HEENT: NC/AT, no icterus, moist mucous membranes  Chest: non-labored on RA  Extremities: Warm, no edema  Skin: No rash or lesion   Psych: Affect appropriate for situation   Neuro:  Mental status: Awake, alert, attentive. Language is fluent  with intact comprehension of commands.  Cranial nerves: PERRL with no relative afferent pupillary defect, conjugate gaze, EOMI, visual fields intact, face symmetric, shoulder shrug strong, tongue protrusion/uvula midline, no dysarthria.   Motor: Normal muscle bulk and tone. No abnormal movements. 5/5 strength in 4/4 extremities.     R L  Deltoid  5 5  Biceps  5 5  Triceps 5 5  Wrist ext 5 5  Finger ext 5 5  Finger abd 5 5    Hip flexion 5 5  Knee flexion 5 5  Knee ext 5 5  Dorsiflexion 5 5    Reflexes: ***reflexes symmetric in biceps, brachioradialis, patellae, and achilles. No clonus, toes down-going.  Sensory: Intact to light touch, pin, vibration, and proprioception. Romberg is negative.   Coordination: FNF and HS without ataxia or dysmetria.    Gait: Normal width, stride length, turn, with symmetric arm swing. Tandem walk intact.    Laboratory:  11/2021 1,288   Vit D 29     AST/ALT 17/17   CBC RESULTS: Recent Labs   Lab Test 11/01/21  0851   WBC 5.5   RBC 5.04   HGB 11.3*   HCT 35.1   MCV 70*   MCH 22.4*   MCHC 32.2   RDW 15.6*          Imaging:    MRI brain 11/2/2022  Impression:   1. The study demonstrates 15-20 foci of T2-hyperintensity within the  cerebral white matter consistent with the clinical suspicion of  demyelinating disease. There are no foci of abnormal enhancement noted  intracranially .   2. There is no significant interval change from the prior study.    MRI c-spine   IMPRESSION: No abnormal signal or enhancement throughout the cervical  spinal cord.    Assessment/Plan:  Stefania Ashton is a 37 year old female who presents for follow up for multiple sclerosis.      Patient seen and discussed with Dr. Peace.  I have reviewed the plan with the patient, who is in agreement.      Clari Perez, DO  Multiple Sclerosis Fellow               Again, thank you for allowing me to participate in the care of your patient.        Sincerely,        Tico Peace MD

## 2022-11-07 NOTE — PROGRESS NOTES
[facility]    OUTPATIENT Physical Therapy ORTHOPEDIC EVALUATION  PLAN OF TREATMENT FOR OUTPATIENT REHABILITATION  (COMPLETE FOR INITIAL CLAIMS ONLY)  Patient's Last Name, First Name, M.I.  YOB: 1985  Stefania Ashton    Provider s Name:  [facility]   Medical Record No.  [id number]   Start of Care Date:  11/07/22   Onset Date:   10/25/22   Treatment Diagnosis:  Closed nondisplaced fracture of medial malleolus of left tibia, initial encounter Medical Diagnosis:  Closed nondisplaced fracture of medial malleolus of left tibia, initial encounter       Goals:     11/07/22 0500   Body Part   Goals listed below are for left ankle   Goal #1   Goal #1 ambulation   Previous Functional Level No restrictions   Current Functional Level Miles patient can walk   Performance Level 1   STG Target Performance [place] patient will be able to  walk   Performance Level 1.5   Rationale for safe household ambulation;for safe community ambulation;for safe outdoor household ambulation;for safe work place ambulation;to maintain proper body mechanics/posture while ambulating to avoid additional compensatory injury due to improper gait mechanics;to promote a healthy and active lifestyle   Due Date 11/28/22    LTG [employer] Performance [place] patient will be able to  walk   Performance Level 2   Rationale for safe outdoor household ambulation;for safe household ambulation;for safe community ambulation;for safe work place ambulation;to maintain proper body mechanics/posture while ambulating to avoid additional compensatory injury due to improper gait mechanics;to promote a healthy and active lifestyle   Due Date 01/02/23   Goal #2   Goal #2 stairs   Previous Functional Level No restrictions   Current Functional Level Ascend/descend stairs;one step at a time;with a railing   STG [employer] Performance  Ascend/descend stairs;in a normal reciprocal pattern;with a railing   Performance Level w+3   Rationale to enter/leave the house safely;to reach upper level of home safely;to reach lower level of home safely;for safe community access to buildings;for safe community ambulaton   Due Date 11/28/22   LTG [employer] Performance Ascend/descend stairs;in a normal reciprocal pattern;without a railing   Rationale to enter/leave the house safely;to reach upper level of home safely;to reach lower level of home safely;for safe community access to buildings;for safe community ambulaton   Due Date 01/02/23       Therapy Frequency:  1x/week  Predicted Duration of Therapy Intervention:  8 weeks    Gonzalo Arias, PT                 I CERTIFY THE NEED FOR THESE SERVICES FURNISHED UNDER        THIS PLAN OF TREATMENT AND WHILE UNDER MY CARE     (Physician attestation of this document indicates review and certification of the therapy plan).                     Certification Date From:  11/07/22   Certification Date To:  01/02/23    Referring Provider:  Mushtaq Caraballo    Initial Assessment        See Epic Evaluation SOC Date: 11/07/22

## 2022-11-07 NOTE — TELEPHONE ENCOUNTER
Ocrevus orders renewed and routed to provider to review and sign therapy plan.     Mimi Trinidad, RNCC  Neurology/Neurosurgery/PM&R

## 2022-11-08 LAB
IGG SERPL-MCNC: 1228 MG/DL (ref 610–1616)
SARS-COV-2 AB SERPL IA-ACNC: 2.4 U/ML
SARS-COV-2 AB SERPL-IMP: POSITIVE

## 2022-11-09 LAB — DEPRECATED CALCIDIOL+CALCIFEROL SERPL-MC: 37 UG/L (ref 20–75)

## 2022-11-14 ENCOUNTER — THERAPY VISIT (OUTPATIENT)
Dept: PHYSICAL THERAPY | Facility: CLINIC | Age: 37
End: 2022-11-14
Payer: OTHER MISCELLANEOUS

## 2022-11-14 DIAGNOSIS — S82.55XA CLOSED NONDISPLACED FRACTURE OF MEDIAL MALLEOLUS OF LEFT TIBIA, INITIAL ENCOUNTER: Primary | ICD-10-CM

## 2022-11-14 PROCEDURE — 97110 THERAPEUTIC EXERCISES: CPT | Mod: GP | Performed by: PHYSICAL THERAPIST

## 2022-11-14 PROCEDURE — 97112 NEUROMUSCULAR REEDUCATION: CPT | Mod: GP | Performed by: PHYSICAL THERAPIST

## 2022-11-15 ENCOUNTER — OFFICE VISIT (OUTPATIENT)
Dept: ORTHOPEDICS | Facility: CLINIC | Age: 37
End: 2022-11-15
Payer: OTHER MISCELLANEOUS

## 2022-11-15 ENCOUNTER — ANCILLARY PROCEDURE (OUTPATIENT)
Dept: GENERAL RADIOLOGY | Facility: CLINIC | Age: 37
End: 2022-11-15
Attending: FAMILY MEDICINE
Payer: COMMERCIAL

## 2022-11-15 ENCOUNTER — MYC MEDICAL ADVICE (OUTPATIENT)
Dept: NEUROLOGY | Facility: CLINIC | Age: 37
End: 2022-11-15

## 2022-11-15 VITALS
HEIGHT: 70 IN | HEART RATE: 78 BPM | SYSTOLIC BLOOD PRESSURE: 133 MMHG | BODY MASS INDEX: 41.52 KG/M2 | DIASTOLIC BLOOD PRESSURE: 84 MMHG | WEIGHT: 290 LBS

## 2022-11-15 DIAGNOSIS — M25.572 ACUTE LEFT ANKLE PAIN: Primary | ICD-10-CM

## 2022-11-15 DIAGNOSIS — M25.572 ACUTE LEFT ANKLE PAIN: ICD-10-CM

## 2022-11-15 DIAGNOSIS — S52.124A CLOSED NONDISPLACED FRACTURE OF HEAD OF RIGHT RADIUS, INITIAL ENCOUNTER: ICD-10-CM

## 2022-11-15 DIAGNOSIS — S52.124D CLOSED NONDISPLACED FRACTURE OF HEAD OF RIGHT RADIUS WITH ROUTINE HEALING, SUBSEQUENT ENCOUNTER: ICD-10-CM

## 2022-11-15 PROCEDURE — 73610 X-RAY EXAM OF ANKLE: CPT | Mod: TC | Performed by: RADIOLOGY

## 2022-11-15 PROCEDURE — 73080 X-RAY EXAM OF ELBOW: CPT | Mod: TC | Performed by: RADIOLOGY

## 2022-11-15 PROCEDURE — 99207 PR FRACTURE CARE IN GLOBAL PERIOD: CPT | Performed by: FAMILY MEDICINE

## 2022-11-15 PROCEDURE — 99213 OFFICE O/P EST LOW 20 MIN: CPT | Mod: 24 | Performed by: FAMILY MEDICINE

## 2022-11-15 NOTE — PATIENT INSTRUCTIONS
# Right Radial Head Fracture: Notable after a fall on 10/25/22 while at work with acute pain over the right elbow and limited range of motion. Patient noted improved pain over the right lateral elbow but does have limitations in range of motion and notable popping with her fifth digit movement. Repeat x-rays today showing non-displaced radial head fracture but no significant healing. Given this will have her work on range of motion and follow-up in two weeks. She will see hand therapy in the meantime.     # Left Ankle Injury: Patient noting acute left ankle pain after same injury with pain over the medial ankle. She does has some tenderness palpation over the medial malleolus with previous x-rays showing concern for an avulsion fracture over the medial malleolus not well seen on repeat x-rays today. Pain resolved today. Given this plan to treat as below and follow-up in two weeks.     Image Findings: Stable radial head fracture, no fracture noted  Treatment: Activities as tolerated, transition out of sling, PT for left ankle and hand therapy for radial head fracture    Job: letter written for no use of right hand for  two weeks  Medications/Injections: Limited tylenol/ibuprofen for pain for 1-2 weeks, none  Follow-up: two weeks for repeat evaluation x-rays for right elbow    It was great seeing you again today!    Mushtaq Caraballo

## 2022-11-15 NOTE — LETTER
11/15/2022         RE: Stefania Ashton  [address]  [address]  [place] [address]        Dear Colleague,    Thank you for referring your patient, Stefania Ashton, to the [facility] [place]. Please see a copy of my visit note below.    ASSESSMENT & PLAN    Stefania was seen today for recheck and recheck.    Diagnoses and all orders for this visit:    Acute left ankle pain  -     Cancel: XR Ankle Left G/E 3 Views; Future  -     Cancel: XR Elbow RT G/E 3 vw; Future  -     XR Ankle Left G/E 3 Views; Future    Closed nondisplaced fracture of head of right radius with routine healing, subsequent encounter  -     XR Elbow Right G/E 3 Views; Future      # Right Radial Head Fracture: Notable after a fall on 10/25/22 while at work with acute pain over the right elbow and limited range of motion. Patient noted improved pain over the right lateral elbow but does have limitations in range of motion and notable popping with her fifth digit movement. Repeat x-rays today showing non-displaced radial head fracture but no significant healing. Given this will have her work on range of motion and follow-up in two weeks. She will see hand therapy in the meantime.     # Left Ankle Injury: Patient noting acute left ankle pain after same injury with pain over the medial ankle. She does has some tenderness palpation over the medial malleolus with previous x-rays showing concern for an avulsion fracture over the medial malleolus not well seen on repeat x-rays today. Pain resolved today. Given this plan to treat as below and follow-up in two weeks.     Image Findings: Stable radial head fracture, no fracture noted  Treatment: Activities as tolerated, transition out of sling, PT for left ankle and hand therapy for radial head fracture    Job: letter written for no use of right hand for  two weeks  Medications/Injections: Limited tylenol/ibuprofen for pain for 1-2 weeks, none  Follow-up: two weeks for  "repeat evaluation x-rays for right elbow    -----    SUBJECTIVE:  Stefania Ashton is a 37 year old female who is seen in follow-up for right elbow and left ankle. They were last seen 11/3/22. DOI 10/25/2022, now 3 weeks out.  The patient is seen by themselves.    Since their last visit reports the Left ankle is feeling a lot better with PT. Still c/o elbow pain and limitation to ROM. States she has sharp pain occasionally with certain movements. Has not started PT with elbow. They indicate that their current pain level is 0/10. They have tried Tylenol, ibuprofen (as needed), physical therapy ( 2 visits for the ankle).      Patient's past medical, surgical, social, and family histories were reviewed today and no changes are noted.    REVIEW OF SYSTEMS:  Constitutional: NEGATIVE for fever, chills, change in weight  Skin: NEGATIVE for worrisome rashes, moles or lesions  GI/: NEGATIVE for bowel or bladder changes  Neuro: NEGATIVE for weakness, dizziness or paresthesias    OBJECTIVE:  /84 (BP Location: Left arm)   Pulse 78   Ht 1.778 m (5' 10\")   Wt 131.5 kg (290 lb)   BMI 41.61 kg/m     General: healthy, alert and in no distress  HEENT: no scleral icterus or conjunctival erythema  Skin: no suspicious lesions or rash. No jaundice.  CV: regular rhythm by palpation, no pedal edema  Resp: normal respiratory effort without conversational dyspnea   Psych: normal mood and affect  Gait: normal steady gait with appropriate coordination and balance  Neuro: normal light touch sensory exam of the extremities.    MSK:    Ortho Exam   LEFT ANKLE  Inspection:  Improved ankle swelling  Palpation:    Tender about the medial malleolus/anterior ankle joint. Remainder of bony and ligamentous landmarks are nontender.  Range of Motion:     Plantarflexion limited slightly by pain / dorsiflexion limited slightly by pain / inversion limited slightly by pain / eversion limited slightly by pain  Strength:    full  Special " Tests:    negative anterior drawer, negative talar tilt, negative valgus stress, negative forced external rotation/eversion, negative Lemon sign, negative squeeze test.       RIGHT ELBOW  Inspection:    No swelling, bruising, discoloration, or obvious deformity or asymmetry  Palpation:    Tender about the radial head/neck. Remainder of bony, ligamentous and tendinous landmarks are nontender.    Crepitus is Absent  Range of Motion:    30 ext, 90 flex  Strength:    Distal strength/sensation in the m/r/u distributions on the right side  Special Tests:  Deferred special testing 2/2 known fracture    Independent visualization of the below image:    Right Elbow: Stable non-displaced radial neck fracture  Left Ankle: No ankle fracture    Mushtaq Caraballo MD, [facility]    Disclaimer: This note consists of symbols derived from keyboarding, dictation and/or voice recognition software. As a result, there may be errors in the script that have gone undetected. Please consider this when interpreting information found in this chart.        Again, thank you for allowing me to participate in the care of your patient.        Sincerely,        Mushtaq Caraballo MD

## 2022-11-15 NOTE — LETTER
[facility] [place]  [address]  [place] [address]  [phone]          November 15, 2022    RE:  Stefania Ashton                                                                                                                                                       [address]    [place] [address]      To whom it may concern:    Stefania Ashton is under my professional care for    Acute left ankle pain  Closed nondisplaced fracture of head of right radius with routine healing, subsequent encounter She  may return to work with the following: The employee is ABLE to return to work today.    When the patient returns to work, the following restrictions apply until 12/6/22:  A) Bend: Frequently (4-8 hours)  B) Squat: Frequently (4-8 hours)  C) Walk/Stand: Frequently (4-8 hours)  D) Reach Above Shoulders: Frequently (4-8 hours)  E) No lifting/twisting/gripping with right hand    Sincerely,        Mushtaq Caraballo MD

## 2022-11-15 NOTE — PROGRESS NOTES
ASSESSMENT & PLAN    Stefania was seen today for recheck and recheck.    Diagnoses and all orders for this visit:    Acute left ankle pain  -     Cancel: XR Ankle Left G/E 3 Views; Future  -     Cancel: XR Elbow RT G/E 3 vw; Future  -     XR Ankle Left G/E 3 Views; Future    Closed nondisplaced fracture of head of right radius with routine healing, subsequent encounter  -     XR Elbow Right G/E 3 Views; Future      # Right Radial Head Fracture: Notable after a fall on 10/25/22 while at work with acute pain over the right elbow and limited range of motion. Patient noted improved pain over the right lateral elbow but does have limitations in range of motion and notable popping with her fifth digit movement. Repeat x-rays today showing non-displaced radial head fracture but no significant healing. Given this will have her work on range of motion and follow-up in two weeks. She will see hand therapy in the meantime.     # Left Ankle Injury: Patient noting acute left ankle pain after same injury with pain over the medial ankle. She does has some tenderness palpation over the medial malleolus with previous x-rays showing concern for an avulsion fracture over the medial malleolus not well seen on repeat x-rays today. Pain resolved today. Given this plan to treat as below and follow-up in two weeks.     Image Findings: Stable radial head fracture, no fracture noted  Treatment: Activities as tolerated, transition out of sling, PT for left ankle and hand therapy for radial head fracture    Job: letter written for no use of right hand for  two weeks  Medications/Injections: Limited tylenol/ibuprofen for pain for 1-2 weeks, none  Follow-up: two weeks for repeat evaluation x-rays for right elbow    -----    SUBJECTIVE:  Stefania Ashton is a 37 year old female who is seen in follow-up for right elbow and left ankle. They were last seen 11/3/22. DOI 10/25/2022, now 3 weeks out.  The patient is seen by themselves.    Since their  "last visit reports the Left ankle is feeling a lot better with PT. Still c/o elbow pain and limitation to ROM. States she has sharp pain occasionally with certain movements. Has not started PT with elbow. They indicate that their current pain level is 0/10. They have tried Tylenol, ibuprofen (as needed), physical therapy ( 2 visits for the ankle).      Patient's past medical, surgical, social, and family histories were reviewed today and no changes are noted.    REVIEW OF SYSTEMS:  Constitutional: NEGATIVE for fever, chills, change in weight  Skin: NEGATIVE for worrisome rashes, moles or lesions  GI/: NEGATIVE for bowel or bladder changes  Neuro: NEGATIVE for weakness, dizziness or paresthesias    OBJECTIVE:  /84 (BP Location: Left arm)   Pulse 78   Ht 1.778 m (5' 10\")   Wt 131.5 kg (290 lb)   BMI 41.61 kg/m     General: healthy, alert and in no distress  HEENT: no scleral icterus or conjunctival erythema  Skin: no suspicious lesions or rash. No jaundice.  CV: regular rhythm by palpation, no pedal edema  Resp: normal respiratory effort without conversational dyspnea   Psych: normal mood and affect  Gait: normal steady gait with appropriate coordination and balance  Neuro: normal light touch sensory exam of the extremities.    MSK:    Ortho Exam   LEFT ANKLE  Inspection:  Improved ankle swelling  Palpation:    Tender about the medial malleolus/anterior ankle joint. Remainder of bony and ligamentous landmarks are nontender.  Range of Motion:     Plantarflexion limited slightly by pain / dorsiflexion limited slightly by pain / inversion limited slightly by pain / eversion limited slightly by pain  Strength:    full  Special Tests:    negative anterior drawer, negative talar tilt, negative valgus stress, negative forced external rotation/eversion, negative Lemon sign, negative squeeze test.       RIGHT ELBOW  Inspection:    No swelling, bruising, discoloration, or obvious deformity or " asymmetry  Palpation:    Tender about the radial head/neck. Remainder of bony, ligamentous and tendinous landmarks are nontender.    Crepitus is Absent  Range of Motion:    30 ext, 90 flex  Strength:    Distal strength/sensation in the m/r/u distributions on the right side  Special Tests:  Deferred special testing 2/2 known fracture    Independent visualization of the below image:    Right Elbow: Stable non-displaced radial neck fracture  Left Ankle: No ankle fracture    Mushtaq Caraballo MD, [facility]    Disclaimer: This note consists of symbols derived from keyboarding, dictation and/or voice recognition software. As a result, there may be errors in the script that have gone undetected. Please consider this when interpreting information found in this chart.

## 2022-11-17 ENCOUNTER — THERAPY VISIT (OUTPATIENT)
Dept: OCCUPATIONAL THERAPY | Facility: CLINIC | Age: 37
End: 2022-11-17
Payer: OTHER MISCELLANEOUS

## 2022-11-17 DIAGNOSIS — M25.621 STIFFNESS OF ELBOW JOINT, RIGHT: Primary | ICD-10-CM

## 2022-11-17 DIAGNOSIS — S52.124A CLOSED NONDISPLACED FRACTURE OF HEAD OF RIGHT RADIUS, INITIAL ENCOUNTER: ICD-10-CM

## 2022-11-17 PROCEDURE — 97165 OT EVAL LOW COMPLEX 30 MIN: CPT | Mod: GO | Performed by: OCCUPATIONAL THERAPIST

## 2022-11-17 PROCEDURE — 97110 THERAPEUTIC EXERCISES: CPT | Mod: GO | Performed by: OCCUPATIONAL THERAPIST

## 2022-11-17 PROCEDURE — 97140 MANUAL THERAPY 1/> REGIONS: CPT | Mod: GO | Performed by: OCCUPATIONAL THERAPIST

## 2022-11-21 ENCOUNTER — HEALTH MAINTENANCE LETTER (OUTPATIENT)
Age: 37
End: 2022-11-21

## 2022-11-23 ENCOUNTER — THERAPY VISIT (OUTPATIENT)
Dept: OCCUPATIONAL THERAPY | Facility: CLINIC | Age: 37
End: 2022-11-23
Payer: OTHER MISCELLANEOUS

## 2022-11-23 DIAGNOSIS — S52.124A CLOSED NONDISPLACED FRACTURE OF HEAD OF RIGHT RADIUS, INITIAL ENCOUNTER: ICD-10-CM

## 2022-11-23 DIAGNOSIS — M25.621 STIFFNESS OF ELBOW JOINT, RIGHT: Primary | ICD-10-CM

## 2022-11-23 PROCEDURE — 97140 MANUAL THERAPY 1/> REGIONS: CPT | Mod: GO | Performed by: OCCUPATIONAL THERAPIST

## 2022-11-23 PROCEDURE — 97110 THERAPEUTIC EXERCISES: CPT | Mod: GO | Performed by: OCCUPATIONAL THERAPIST

## 2022-11-23 NOTE — PROGRESS NOTES
SOAP note objective information for 11/23/2022:    Diagnosis:   Right radial head fracture  DOI:  10/25/2022  Therapy referral: 11/3/2022  Post:  4w 1d    Pain Level (Scale 0-10)   11/17/2022 11/23/2022   At Rest 0    With Use 5-6 4-5     ROM  Pain Report: - none  + mild    ++ moderate    +++ severe   Elbow 11/17/2022 11/17/2022 11/23/2022   AROM (PROM) Left Right Right   Extension 0 -25 (-20) -15   Flexion 150 130 140   Supination 70 30 (45) 45 (55)   Pronation 80 60 65     Home Exercise Program:  Warmth for stiffness  Self MFR to bicep to facilitate elbow extension   AROM elbow E/F and P/S  PROM elbow flexion and supination   Hammer stretch for P/S  PROM elbow extension in gravity assisted position  Encourage functional use of right arm    Next Visit:  See in 1-2 weeks  Assess response to hammer stretch  Continue MFR and jt mobs to facilitate ROM  Progress to weight bearing to facilitate elbow extension  Add strengthening as tolerated     Please refer to the daily flowsheet for treatment today, total treatment time and time spent performing 1:1 timed codes.

## 2022-12-01 ENCOUNTER — ANCILLARY PROCEDURE (OUTPATIENT)
Dept: GENERAL RADIOLOGY | Facility: CLINIC | Age: 37
End: 2022-12-01
Attending: FAMILY MEDICINE
Payer: COMMERCIAL

## 2022-12-01 ENCOUNTER — OFFICE VISIT (OUTPATIENT)
Dept: ORTHOPEDICS | Facility: CLINIC | Age: 37
End: 2022-12-01
Payer: OTHER MISCELLANEOUS

## 2022-12-01 VITALS — HEIGHT: 70 IN | BODY MASS INDEX: 41.61 KG/M2

## 2022-12-01 DIAGNOSIS — S52.124D CLOSED NONDISPLACED FRACTURE OF HEAD OF RIGHT RADIUS WITH ROUTINE HEALING, SUBSEQUENT ENCOUNTER: Primary | ICD-10-CM

## 2022-12-01 DIAGNOSIS — S52.124D CLOSED NONDISPLACED FRACTURE OF HEAD OF RIGHT RADIUS WITH ROUTINE HEALING, SUBSEQUENT ENCOUNTER: ICD-10-CM

## 2022-12-01 PROCEDURE — 73080 X-RAY EXAM OF ELBOW: CPT | Mod: TC | Performed by: RADIOLOGY

## 2022-12-01 PROCEDURE — 99213 OFFICE O/P EST LOW 20 MIN: CPT | Mod: 24 | Performed by: FAMILY MEDICINE

## 2022-12-01 PROCEDURE — 99207 PR FRACTURE CARE IN GLOBAL PERIOD: CPT | Performed by: FAMILY MEDICINE

## 2022-12-01 NOTE — PATIENT INSTRUCTIONS
# Right Radial Head Fracture: Notable after a fall on 10/25/22. She has improved pain and range of motion today. Repeat x-rays today showing non-displaced radial head fracture with healing. Given this will have her work on range of motion and follow-up in three weeks. She will continue to see hand therapy in the meantime.     # Left Ankle Injury: Patient noting acute left ankle pain after same injury with pain over the medial ankle. She does has some tenderness palpation over the medial malleolus with previous x-rays showing concern for an avulsion fracture over the medial malleolus not well seen on repeat x-rays today. Pain resolved today. Given this plan to treat as below and follow-up as needed.     Image Findings: Stable radial head fracture, no fracture noted  Treatment: Activities as tolerated, transition out of sling, PT for left ankle and hand therapy for radial head fracture    Job: Can work as tolerated  Medications/Injections: Limited tylenol/ibuprofen for pain for 1-2 weeks, none  Follow-up: three weeks for repeat evaluation x-rays for right elbow  Can be virtual visit    It was great seeing you again today!    Mushtaq Caraballo

## 2022-12-01 NOTE — PROGRESS NOTES
ASSESSMENT & PLAN    Stefania was seen today for follow up and follow up.    Diagnoses and all orders for this visit:    Closed nondisplaced fracture of head of right radius with routine healing, subsequent encounter  -     XR Elbow RT G/E 3 vw; Future      # Right Radial Head Fracture: Notable after a fall on 10/25/22. She has improved pain and range of motion today. Repeat x-rays today showing non-displaced radial head fracture with healing. Given this will have her work on range of motion and follow-up in three weeks. She will continue to see hand therapy in the meantime.     # Left Ankle Injury: Patient noting acute left ankle pain after same injury with pain over the medial ankle. She does has some tenderness palpation over the medial malleolus with previous x-rays showing concern for an avulsion fracture over the medial malleolus not well seen on repeat x-rays today. Pain resolved today. Given this plan to treat as below and follow-up as needed.     Image Findings: Stable radial head fracture, no fracture noted  Treatment: Activities as tolerated, transition out of sling, PT for left ankle and hand therapy for radial head fracture    Job: Can work as tolerated  Medications/Injections: Limited tylenol/ibuprofen for pain for 1-2 weeks, none  Follow-up: three weeks for repeat evaluation x-rays for right elbow  Can be virtual visit    -----    SUBJECTIVE:  Stefania Ashton is a 37 year old female who is seen in follow-up for right elbow and left ankle injury. They were last seen 11/15/2022.  DOI 10/25/22,  now ~ 5 weeks out. The patient is seen with their son.    Since their last visit reports slowly improving pain in elbow and ankle. They have tried rest, sling, OT.        Patient's past medical, surgical, social, and family histories were reviewed today and no changes are noted.    REVIEW OF SYSTEMS:  Constitutional: NEGATIVE for fever, chills, change in weight  Skin: NEGATIVE for worrisome rashes, moles or  "lesions  GI/: NEGATIVE for bowel or bladder changes  Neuro: NEGATIVE for weakness, dizziness or paresthesias    OBJECTIVE:  Ht 1.778 m (5' 10\")   BMI 41.61 kg/m     General: healthy, alert and in no distress  HEENT: no scleral icterus or conjunctival erythema  Skin: no suspicious lesions or rash. No jaundice.  CV: regular rhythm by palpation, no pedal edema  Resp: normal respiratory effort without conversational dyspnea   Psych: normal mood and affect  Gait: normal steady gait with appropriate coordination and balance  Neuro: normal light touch sensory exam of the extremities.    MSK:    RIGHT ELBOW  Inspection:    No swelling, bruising, discoloration, or obvious deformity or asymmetry  Palpation:  Nnontender.    Crepitus is Absent  Range of Motion:     Extension 15 deg / flexion 100 / pronation full / supination full  Strength:    No deficits in flexion, extension, pronation, or supination.  Special Tests:    Positive: none    Negative: Pain with resisted wrist extension, pain with resisted middle finger extension, pain with resisted wrist flexion, pain with resisted supination, pain with resisted pronation    LEFT ANKLE  Inspection:    No swelling, redness, edema or ecchymosis is observed  Palpation:  Nontender.  Range of Motion:     Plantarflexion full / dorsiflexion full / inversion full / eversion full  Strength:    full  Special Tests:    negative anterior drawer, negative talar tilt, negative valgus stress, negative forced external rotation/eversion, negative Lemon sign    Independent visualization of the below image:                                                                     IMPRESSION: Healing mildly displaced fracture of the neck of the  radius with slight interval healing since the prior study. No  effusion. No additional fractures.     MD Mushtaq BURRELL MD, [facility]    Disclaimer: This note consists of symbols derived from " keyboarding, dictation and/or voice recognition software. As a result, there may be errors in the script that have gone undetected. Please consider this when interpreting information found in this chart.

## 2022-12-01 NOTE — LETTER
[facility] [place]  [address]  [place] [address]  [phone]          December 1, 2022    RE:  Stefania Ashton                                                                                                                                                       [address]    [place] [address]        To whom it may concern:    Stefania Ashton is under my professional care for Closed nondisplaced fracture of head of right radius with routine healing, subsequent encounter She  may return to work with the following: The employee is ABLE to return to work today without restrictions.         Sincerely,        Mushtaq Caraballo MD

## 2022-12-01 NOTE — LETTER
12/1/2022         RE: Stefania Ashton  [address]  [address]  [place] [address]        Dear Colleague,    Thank you for referring your patient, Stefania Ashton, to the [facility] [place]. Please see a copy of my visit note below.    ASSESSMENT & PLAN    Stefania was seen today for follow up and follow up.    Diagnoses and all orders for this visit:    Closed nondisplaced fracture of head of right radius with routine healing, subsequent encounter  -     XR Elbow RT G/E 3 vw; Future      # Right Radial Head Fracture: Notable after a fall on 10/25/22. She has improved pain and range of motion today. Repeat x-rays today showing non-displaced radial head fracture with healing. Given this will have her work on range of motion and follow-up in three weeks. She will continue to see hand therapy in the meantime.     # Left Ankle Injury: Patient noting acute left ankle pain after same injury with pain over the medial ankle. She does has some tenderness palpation over the medial malleolus with previous x-rays showing concern for an avulsion fracture over the medial malleolus not well seen on repeat x-rays today. Pain resolved today. Given this plan to treat as below and follow-up as needed.     Image Findings: Stable radial head fracture, no fracture noted  Treatment: Activities as tolerated, transition out of sling, PT for left ankle and hand therapy for radial head fracture    Job: Can work as tolerated  Medications/Injections: Limited tylenol/ibuprofen for pain for 1-2 weeks, none  Follow-up: three weeks for repeat evaluation x-rays for right elbow  Can be virtual visit    -----    SUBJECTIVE:  Stefania Ashton is a 37 year old female who is seen in follow-up for right elbow and left ankle injury. They were last seen 11/15/2022.  DOI 10/25/22,  now ~ 5 weeks out. The patient is seen with their son.    Since their last visit reports slowly improving pain in elbow and ankle. They have  "tried rest, sling, OT.        Patient's past medical, surgical, social, and family histories were reviewed today and no changes are noted.    REVIEW OF SYSTEMS:  Constitutional: NEGATIVE for fever, chills, change in weight  Skin: NEGATIVE for worrisome rashes, moles or lesions  GI/: NEGATIVE for bowel or bladder changes  Neuro: NEGATIVE for weakness, dizziness or paresthesias    OBJECTIVE:  Ht 1.778 m (5' 10\")   BMI 41.61 kg/m     General: healthy, alert and in no distress  HEENT: no scleral icterus or conjunctival erythema  Skin: no suspicious lesions or rash. No jaundice.  CV: regular rhythm by palpation, no pedal edema  Resp: normal respiratory effort without conversational dyspnea   Psych: normal mood and affect  Gait: normal steady gait with appropriate coordination and balance  Neuro: normal light touch sensory exam of the extremities.    MSK:    RIGHT ELBOW  Inspection:    No swelling, bruising, discoloration, or obvious deformity or asymmetry  Palpation:  Nnontender.    Crepitus is Absent  Range of Motion:     Extension 15 deg / flexion 100 / pronation full / supination full  Strength:    No deficits in flexion, extension, pronation, or supination.  Special Tests:    Positive: none    Negative: Pain with resisted wrist extension, pain with resisted middle finger extension, pain with resisted wrist flexion, pain with resisted supination, pain with resisted pronation    LEFT ANKLE  Inspection:    No swelling, redness, edema or ecchymosis is observed  Palpation:  Nontender.  Range of Motion:     Plantarflexion full / dorsiflexion full / inversion full / eversion full  Strength:    full  Special Tests:    negative anterior drawer, negative talar tilt, negative valgus stress, negative forced external rotation/eversion, negative Lemon sign    Independent visualization of the below image:                                                                     IMPRESSION: Healing mildly displaced fracture of the " neck of the  radius with slight interval healing since the prior study. No  effusion. No additional fractures.     MD Mushtaq BURRELL MD, [facility]    Disclaimer: This note consists of symbols derived from keyboarding, dictation and/or voice recognition software. As a result, there may be errors in the script that have gone undetected. Please consider this when interpreting information found in this chart.          Again, thank you for allowing me to participate in the care of your patient.        Sincerely,        Mushtaq Caraballo MD

## 2022-12-07 RX ORDER — DIPHENHYDRAMINE HYDROCHLORIDE 50 MG/ML
50 INJECTION INTRAMUSCULAR; INTRAVENOUS
Status: CANCELLED
Start: 2022-12-07

## 2022-12-07 RX ORDER — METHYLPREDNISOLONE SODIUM SUCCINATE 125 MG/2ML
125 INJECTION, POWDER, LYOPHILIZED, FOR SOLUTION INTRAMUSCULAR; INTRAVENOUS ONCE
Status: CANCELLED | OUTPATIENT
Start: 2022-12-07

## 2022-12-07 RX ORDER — METHYLPREDNISOLONE SODIUM SUCCINATE 125 MG/2ML
125 INJECTION, POWDER, LYOPHILIZED, FOR SOLUTION INTRAMUSCULAR; INTRAVENOUS
Status: CANCELLED
Start: 2022-12-07

## 2022-12-07 RX ORDER — HEPARIN SODIUM,PORCINE 10 UNIT/ML
5 VIAL (ML) INTRAVENOUS
Status: CANCELLED | OUTPATIENT
Start: 2022-12-07

## 2022-12-07 RX ORDER — HEPARIN SODIUM (PORCINE) LOCK FLUSH IV SOLN 100 UNIT/ML 100 UNIT/ML
5 SOLUTION INTRAVENOUS
Status: CANCELLED | OUTPATIENT
Start: 2022-12-07

## 2022-12-07 RX ORDER — ALBUTEROL SULFATE 90 UG/1
1-2 AEROSOL, METERED RESPIRATORY (INHALATION)
Status: CANCELLED
Start: 2022-12-07

## 2022-12-07 RX ORDER — EPINEPHRINE 1 MG/ML
0.3 INJECTION, SOLUTION INTRAMUSCULAR; SUBCUTANEOUS EVERY 5 MIN PRN
Status: CANCELLED | OUTPATIENT
Start: 2022-12-07

## 2022-12-07 RX ORDER — MEPERIDINE HYDROCHLORIDE 25 MG/ML
25 INJECTION INTRAMUSCULAR; INTRAVENOUS; SUBCUTANEOUS EVERY 30 MIN PRN
Status: CANCELLED | OUTPATIENT
Start: 2022-12-07

## 2022-12-07 RX ORDER — ACETAMINOPHEN 325 MG/1
650 TABLET ORAL ONCE
Status: CANCELLED | OUTPATIENT
Start: 2022-12-07

## 2022-12-07 RX ORDER — ALBUTEROL SULFATE 0.83 MG/ML
2.5 SOLUTION RESPIRATORY (INHALATION)
Status: CANCELLED | OUTPATIENT
Start: 2022-12-07

## 2022-12-07 RX ORDER — DIPHENHYDRAMINE HCL 25 MG
50 CAPSULE ORAL ONCE
Status: CANCELLED | OUTPATIENT
Start: 2022-12-07

## 2022-12-09 ENCOUNTER — THERAPY VISIT (OUTPATIENT)
Dept: PHYSICAL THERAPY | Facility: CLINIC | Age: 37
End: 2022-12-09
Payer: OTHER MISCELLANEOUS

## 2022-12-09 DIAGNOSIS — S82.55XA CLOSED NONDISPLACED FRACTURE OF MEDIAL MALLEOLUS OF LEFT TIBIA, INITIAL ENCOUNTER: Primary | ICD-10-CM

## 2022-12-09 PROCEDURE — 97110 THERAPEUTIC EXERCISES: CPT | Mod: GP | Performed by: PHYSICAL THERAPIST

## 2022-12-12 ENCOUNTER — THERAPY VISIT (OUTPATIENT)
Dept: OCCUPATIONAL THERAPY | Facility: CLINIC | Age: 37
End: 2022-12-12
Payer: OTHER MISCELLANEOUS

## 2022-12-12 DIAGNOSIS — S52.124A CLOSED NONDISPLACED FRACTURE OF HEAD OF RIGHT RADIUS, INITIAL ENCOUNTER: ICD-10-CM

## 2022-12-12 DIAGNOSIS — M25.621 STIFFNESS OF ELBOW JOINT, RIGHT: Primary | ICD-10-CM

## 2022-12-12 PROCEDURE — 97110 THERAPEUTIC EXERCISES: CPT | Mod: GO | Performed by: OCCUPATIONAL THERAPIST

## 2022-12-12 PROCEDURE — 97140 MANUAL THERAPY 1/> REGIONS: CPT | Mod: GO | Performed by: OCCUPATIONAL THERAPIST

## 2022-12-12 NOTE — PROGRESS NOTES
SOAP note objective information for 12/12/2022:    Diagnosis:   Right radial head fracture  DOI:  10/25/2022  Therapy referral: 11/3/2022  Post:  6w 6d    ROM  Pain Report: - none  + mild    ++ moderate    +++ severe   Elbow 11/17/2022 11/17/2022 11/23/2022 12/12/2022   AROM (PROM) Left Right Right Right   Extension 0 -25 (-20) -15 -10   Flexion 150 130 140 140   Supination 70 30 (45) 45 (55) 65   Pronation 80 60 65 80     Strength   (Measured in pounds)  Pain Report: - none  + mild    ++ moderate    +++ severe    12/12/2022 12/12/2022   Trials Left Right   1  2  3 43  39  31 43-  39-  39-   Average 38 40     Home Exercise Program:  Warmth for stiffness  Self MFR to bicep to facilitate elbow extension   AROM elbow E/F and P/S  PROM elbow flexion and supination   Hammer stretch for P/S  PROM elbow extension in gravity assisted position  Encourage functional use of right arm  Elbow E/F isotonics   Wrist E/F isotonics     Next Visit:  See in 1-2 weeks  Assess response to elbow and wrist isotonics   Progress to weight bearing    Please refer to the daily flowsheet for treatment today, total treatment time and time spent performing 1:1 timed codes.

## 2022-12-29 ENCOUNTER — THERAPY VISIT (OUTPATIENT)
Dept: OCCUPATIONAL THERAPY | Facility: CLINIC | Age: 37
End: 2022-12-29
Payer: OTHER MISCELLANEOUS

## 2022-12-29 DIAGNOSIS — M25.621 STIFFNESS OF ELBOW JOINT, RIGHT: Primary | ICD-10-CM

## 2022-12-29 DIAGNOSIS — S52.124A CLOSED NONDISPLACED FRACTURE OF HEAD OF RIGHT RADIUS, INITIAL ENCOUNTER: ICD-10-CM

## 2022-12-29 PROCEDURE — 97112 NEUROMUSCULAR REEDUCATION: CPT | Mod: GO | Performed by: OCCUPATIONAL THERAPIST

## 2022-12-29 PROCEDURE — 97110 THERAPEUTIC EXERCISES: CPT | Mod: GO | Performed by: OCCUPATIONAL THERAPIST

## 2022-12-29 NOTE — PROGRESS NOTES
SOAP note objective information for 12/29/2022:    Diagnosis:   Right radial head fracture  DOI:  10/25/2022  Therapy referral: 11/3/2022  Post:  9w 2d    Pain Level (Scale 0-10)   11/17/2022 12/29/2022   At Rest 0    With Use 5-6 1-2     ROM  Pain Report: - none  + mild    ++ moderate    +++ severe   Elbow 11/17/2022 11/17/2022 11/23/2022 12/12/2022 12/29/2022   AROM (PROM) Left Right Right Right Right   Extension 0 -25 (-20) -15 -10 -5 (0)   Flexion 150 130 140 140 145   Supination 70 30 (45) 45 (55) 65 65 (70)   Pronation 80 60 65 80 80     Strength   (Measured in pounds)  Pain Report: - none  + mild    ++ moderate    +++ severe    12/12/2022 12/12/2022 12/29/2022   Trials Left Right Right   1  2  3 43  39  31 43-  39-  39- 47-  46-  39-   Average 38 40 44     Home Exercise Program:  Warmth for stiffness  Self MFR to bicep to facilitate elbow extension   AROM elbow E/F and P/S  PROM elbow flexion and supination   Hammer stretch for P/S  PROM elbow extension in gravity assisted position  Encourage functional use of right arm  Elbow E/F isotonics   Wrist E/F isotonics   Progress to weight bearing on wall and counter top with weight shift and mini push ups    Next Visit:  See in 2 weeks  Assess response to weight bearing  Progress Report and UEFI  Anticipate discharge to [facility]    Please refer to the daily flowsheet for treatment today, total treatment time and time spent performing 1:1 timed codes.

## 2023-01-12 ENCOUNTER — THERAPY VISIT (OUTPATIENT)
Dept: OCCUPATIONAL THERAPY | Facility: CLINIC | Age: 38
End: 2023-01-12
Payer: OTHER MISCELLANEOUS

## 2023-01-12 DIAGNOSIS — M25.621 STIFFNESS OF ELBOW JOINT, RIGHT: Primary | ICD-10-CM

## 2023-01-12 DIAGNOSIS — S52.124A CLOSED NONDISPLACED FRACTURE OF HEAD OF RIGHT RADIUS, INITIAL ENCOUNTER: ICD-10-CM

## 2023-01-12 PROCEDURE — 97112 NEUROMUSCULAR REEDUCATION: CPT | Mod: GO | Performed by: OCCUPATIONAL THERAPIST

## 2023-01-12 PROCEDURE — 97110 THERAPEUTIC EXERCISES: CPT | Mod: GO | Performed by: OCCUPATIONAL THERAPIST

## 2023-01-12 NOTE — PROGRESS NOTES
Hand Therapy Progress/Discharge Note    Current Date:  1/12/2023    Reporting period is 11/17/2022 to 1/12/2023    Diagnosis:   Right radial head fracture  DOI:  10/25/2022  Therapy referral: 11/3/2022  Post:  11w 2d    Subjective:   Subjective changes noted by patient:  The elbow is doing pretty well. Still feel some soreness and stiffness at times.  Functional changes noted by patient:  Improvement in Self Care Tasks (bathing)  Patient has noted adverse reaction to:  None    Functional Outcome Measure:  Upper Extremity Functional Index  SCORE:   Column Totals: 77/80  (A lower score indicates greater disability.)    Objective:  Pain Level (Scale 0-10)   11/17/2022 1/12/2023   At Rest 0 0   With Use 5-6 2-3     Pain Description  Date 11/17/2022 1/12/2023   Location elbow and forearm Elbow and forearm   Pain Quality Sharp and Shooting Soreness, stiffness   Frequency intermittent   Intermittent    Pain is worst  daytime mornings   Exacerbated by  with use Sleeping in flexion   Relieved by rest stretching   Progression Gradually getting better Improved      Edema  None    Sensation  WNL throughout all nerve distributions; per patient report    ROM  Elbow 11/17/2022 11/17/2022 1/12/2023   AROM (PROM) Left Right Right   Extension 0 -25 (-20) 0   Flexion 150 130 145   Supination 70 30 (45) 70   Pronation 80 60 80     Strength   (Measured in pounds)  Pain Report: - none  + mild    ++ moderate    +++ severe    12/12/2022 12/12/2022 1/12/2023   Trials Left Right Right   1  2  3 43  39  31 43-  39-  39- 47  50  47   Average 38 40 48     Please refer to the daily flowsheet for treatment provided today.     Assessment:  Response to therapy has been improvement to:  ROM of Elbow:  All Planes  Strength:     Pain:  intensity of pain is decreased    Overall Assessment:  Patient's symptoms are resolving and patient is independent in home exercise program.  STG/LTG:  STGoals have been reviewed and progress or achievement has  occurred;  see goal sheet for details and updates.  I have re-evaluated this patient and find that the nature, scope, duration and intensity of the therapy is appropriate for the medical condition of the patient.    Plan:  Frequency/Duration:  Discharge from Hand Therapy; continue home program.    Recommendations for Continued Therapy  Home Exercise Program:  Warmth for stiffness  Self MFR to bicep to facilitate elbow extension   AROM elbow E/F and P/S  PROM elbow flexion and supination   Hammer stretch for P/S  PROM elbow extension in gravity assisted position  Encourage functional use of right arm  Elbow E/F isotonics   Wrist E/F isotonics   Progress to weight bearing on wall and counter top with weight shift and mini push ups

## 2023-02-13 NOTE — PROGRESS NOTES
ASSESSMENT & PLAN    Diagnoses and all orders for this visit:    Closed nondisplaced fracture of head of right radius with routine healing, subsequent encounter      # Right Radial Head Fracture: Notable after a fall on 10/25/22. She has no pain and range of motion today. Repeat x-rays today showing non-displaced radial head fracture with complete healing. Minimal pain on exam. Given this she can continue work without restrictions, follow-up as needed. Can fill out MMI when needed.     Image Findings: Stable radial head fracture, no fracture noted  Treatment: Activities as tolerated, as tolerated  Job: Can work as tolerated  Medications/Injections: Limited tylenol/ibuprofen for pain for 1-2 weeks, none  Follow-up: as needed  -----    SUBJECTIVE:  Stefania Ashton is a 37 year old female who is seen in follow-up for right elbow fracture. They were last seen 12/1/2022. DOI 10/25/22, now 16 weeks out.     Since their last visit reports that her elbow is feeling better but does note some slight throbbing pain during sleep. They indicate that their current pain level is 0/10. They have tried rest, sling, OT. Has been graduated from OT.      Patient's past medical, surgical, social, and family histories were reviewed today and no changes are noted.    REVIEW OF SYSTEMS:  Constitutional: NEGATIVE for fever, chills, change in weight  Skin: NEGATIVE for worrisome rashes, moles or lesions  GI/: NEGATIVE for bowel or bladder changes  Neuro: NEGATIVE for weakness, dizziness or paresthesias    OBJECTIVE:  There were no vitals taken for this visit.   General: healthy, alert and in no distress  HEENT: no scleral icterus or conjunctival erythema  Skin: no suspicious lesions or rash. No jaundice.  CV: regular rhythm by palpation, no pedal edema  Resp: normal respiratory effort without conversational dyspnea   Psych: normal mood and affect  Gait: normal steady gait with appropriate coordination and balance  Neuro: normal light  touch sensory exam of the extremities.    MSK:    RIGHT ELBOW  Inspection:    No swelling, bruising, discoloration, or obvious deformity or asymmetry  Palpation:  Nnontender.    Crepitus is Absent  Range of Motion:     Extension 15 deg / flexion 100 / pronation full / supination full  Strength:    No deficits in flexion, extension, pronation, or supination.  Special Tests:    Positive: none    Negative: Pain with resisted wrist extension, pain with resisted middle finger extension, pain with resisted wrist flexion, pain with resisted supination, pain with resisted pronation  Independent visualization of the below image:        Mushtaq Caraballo MD, [facility]    Disclaimer: This note consists of symbols derived from keyboarding, dictation and/or voice recognition software. As a result, there may be errors in the script that have gone undetected. Please consider this when interpreting information found in this chart.

## 2023-02-14 ENCOUNTER — ANCILLARY PROCEDURE (OUTPATIENT)
Dept: GENERAL RADIOLOGY | Facility: CLINIC | Age: 38
End: 2023-02-14
Attending: FAMILY MEDICINE
Payer: COMMERCIAL

## 2023-02-14 ENCOUNTER — OFFICE VISIT (OUTPATIENT)
Dept: ORTHOPEDICS | Facility: CLINIC | Age: 38
End: 2023-02-14
Payer: OTHER MISCELLANEOUS

## 2023-02-14 ENCOUNTER — TELEPHONE (OUTPATIENT)
Dept: INFUSION THERAPY | Facility: CLINIC | Age: 38
End: 2023-02-14

## 2023-02-14 VITALS — BODY MASS INDEX: 41.61 KG/M2 | DIASTOLIC BLOOD PRESSURE: 89 MMHG | WEIGHT: 290 LBS | SYSTOLIC BLOOD PRESSURE: 128 MMHG

## 2023-02-14 DIAGNOSIS — S52.124D CLOSED NONDISPLACED FRACTURE OF HEAD OF RIGHT RADIUS WITH ROUTINE HEALING, SUBSEQUENT ENCOUNTER: Primary | ICD-10-CM

## 2023-02-14 PROCEDURE — 99213 OFFICE O/P EST LOW 20 MIN: CPT | Performed by: FAMILY MEDICINE

## 2023-02-14 PROCEDURE — 73080 X-RAY EXAM OF ELBOW: CPT | Mod: TC | Performed by: RADIOLOGY

## 2023-02-14 ASSESSMENT — PAIN SCALES - GENERAL: PAINLEVEL: NO PAIN (0)

## 2023-02-14 NOTE — PROGRESS NOTES
ASSESSMENT & PLAN    Stefania was seen today for pain.    Diagnoses and all orders for this visit:    Closed nondisplaced fracture of head of right radius with routine healing, subsequent encounter  -     XR Elbow RT G/E 3 vw       # Right Radial Head Fracture: Notable after a fall on 10/25/22. She has no pain and range of motion today. Repeat x-rays today showing non-displaced radial head fracture with complete healing. Minimal pain on exam. Given this she can continue work without restrictions, follow-up as needed. Can fill out MMI when needed.     Image Findings: Stable radial head fracture, no fracture noted  Treatment: Activities as tolerated, as tolerated  Job: Can work as tolerated  Medications/Injections: Limited tylenol/ibuprofen for pain for 1-2 weeks, none  Follow-up: as needed  -----    SUBJECTIVE:  Stefania Ashton is a 37 year old female who is seen in follow-up for right elbow fracture. They were last seen 12/1/2022. DOI 10/25/22, now 16 weeks out.     Since their last visit reports that her elbow is feeling better but does note some slight throbbing pain during sleep. They indicate that their current pain level is 0/10. They have tried rest, sling, OT. Has been graduated from OT.      Patient's past medical, surgical, social, and family histories were reviewed today and no changes are noted.    REVIEW OF SYSTEMS:  Constitutional: NEGATIVE for fever, chills, change in weight  Skin: NEGATIVE for worrisome rashes, moles or lesions  GI/: NEGATIVE for bowel or bladder changes  Neuro: NEGATIVE for weakness, dizziness or paresthesias    OBJECTIVE:  /89   Wt 131.5 kg (290 lb)   BMI 41.61 kg/m     General: healthy, alert and in no distress  HEENT: no scleral icterus or conjunctival erythema  Skin: no suspicious lesions or rash. No jaundice.  CV: regular rhythm by palpation, no pedal edema  Resp: normal respiratory effort without conversational dyspnea   Psych: normal mood and affect  Gait: normal  steady gait with appropriate coordination and balance  Neuro: normal light touch sensory exam of the extremities.    MSK:    RIGHT ELBOW  Inspection:    No swelling, bruising, discoloration, or obvious deformity or asymmetry  Palpation:  Nnontender.    Crepitus is Absent  Range of Motion:     Extension 15 deg / flexion 100 / pronation full / supination full  Strength:    No deficits in flexion, extension, pronation, or supination.  Special Tests:    Positive: none    Negative: Pain with resisted wrist extension, pain with resisted middle finger extension, pain with resisted wrist flexion, pain with resisted supination, pain with resisted pronation  Independent visualization of the below image:        Mushtaq Caraballo MD, [facility]    Disclaimer: This note consists of symbols derived from keyboarding, dictation and/or voice recognition software. As a result, there may be errors in the script that have gone undetected. Please consider this when interpreting information found in this chart.

## 2023-02-14 NOTE — PATIENT INSTRUCTIONS
# Right Radial Head Fracture: Notable after a fall on 10/25/22. She has no pain and full range of motion today. Repeat x-rays today showing non-displaced radial head fracture with complete healing. Minimal pain on exam. Given this she can continue work without restrictions, follow-up as needed. Can fill out MMI when needed.     Image Findings: Stable radial head fracture, no fracture noted  Treatment: Activities as tolerated, as tolerated  Job: Can work as tolerated  Medications/Injections: Limited tylenol/ibuprofen for pain for 1-2 weeks, none  Follow-up: as needed    Please call [phone]   Ask for my team if you have any questions or concerns    If you have not yet received the influenza vaccine but would like to get one, please call  [phone] or you can schedule via [employer]    It was great seeing you again today!    Mushtaq Caraballo MD, CAQSM

## 2023-02-14 NOTE — LETTER
2/14/2023         RE: Stefania Ashton  [address]  [address]  [place] [place] [address]        Dear Colleague,    Thank you for referring your patient, Stefania Ashton, to the [facility]. Please see a copy of my visit note below.    ASSESSMENT & PLAN    Diagnoses and all orders for this visit:    Closed nondisplaced fracture of head of right radius with routine healing, subsequent encounter      # Right Radial Head Fracture: Notable after a fall on 10/25/22. She has no pain and range of motion today. Repeat x-rays today showing non-displaced radial head fracture with complete healing. Minimal pain on exam. Given this she can continue work without restrictions, follow-up as needed. Can fill out MMI when needed.     Image Findings: Stable radial head fracture, no fracture noted  Treatment: Activities as tolerated, as tolerated  Job: Can work as tolerated  Medications/Injections: Limited tylenol/ibuprofen for pain for 1-2 weeks, none  Follow-up: as needed  -----    SUBJECTIVE:  Stefania Ashton is a 37 year old female who is seen in follow-up for right elbow fracture. They were last seen 12/1/2022. DOI 10/25/22, now 16 weeks out.     Since their last visit reports that her elbow is feeling better but does note some slight throbbing pain during sleep. They indicate that their current pain level is 0/10. They have tried rest, sling, OT. Has been graduated from OT.      Patient's past medical, surgical, social, and family histories were reviewed today and no changes are noted.    REVIEW OF SYSTEMS:  Constitutional: NEGATIVE for fever, chills, change in weight  Skin: NEGATIVE for worrisome rashes, moles or lesions  GI/: NEGATIVE for bowel or bladder changes  Neuro: NEGATIVE for weakness, dizziness or paresthesias    OBJECTIVE:  There were no vitals taken for this visit.   General: healthy, alert and in no distress  HEENT: no scleral icterus or conjunctival erythema  Skin: no  suspicious lesions or rash. No jaundice.  CV: regular rhythm by palpation, no pedal edema  Resp: normal respiratory effort without conversational dyspnea   Psych: normal mood and affect  Gait: normal steady gait with appropriate coordination and balance  Neuro: normal light touch sensory exam of the extremities.    MSK:    RIGHT ELBOW  Inspection:    No swelling, bruising, discoloration, or obvious deformity or asymmetry  Palpation:  Nnontender.    Crepitus is Absent  Range of Motion:     Extension 15 deg / flexion 100 / pronation full / supination full  Strength:    No deficits in flexion, extension, pronation, or supination.  Special Tests:    Positive: none    Negative: Pain with resisted wrist extension, pain with resisted middle finger extension, pain with resisted wrist flexion, pain with resisted supination, pain with resisted pronation  Independent visualization of the below image:        Mushtaq Caraballo MD, [facility]    Disclaimer: This note consists of symbols derived from keyboarding, dictation and/or voice recognition software. As a result, there may be errors in the script that have gone undetected. Please consider this when interpreting information found in this chart.      ASSESSMENT & PLAN    Stefania was seen today for pain.    Diagnoses and all orders for this visit:    Closed nondisplaced fracture of head of right radius with routine healing, subsequent encounter  -     XR Elbow RT G/E 3 vw       # Right Radial Head Fracture: Notable after a fall on 10/25/22. She has no pain and range of motion today. Repeat x-rays today showing non-displaced radial head fracture with complete healing. Minimal pain on exam. Given this she can continue work without restrictions, follow-up as needed. Can fill out MMI when needed.     Image Findings: Stable radial head fracture, no fracture noted  Treatment: Activities as tolerated, as tolerated  Job: Can work as  tolerated  Medications/Injections: Limited tylenol/ibuprofen for pain for 1-2 weeks, none  Follow-up: as needed  -----    SUBJECTIVE:  Stefania Ashton is a 37 year old female who is seen in follow-up for right elbow fracture. They were last seen 12/1/2022. DOI 10/25/22, now 16 weeks out.     Since their last visit reports that her elbow is feeling better but does note some slight throbbing pain during sleep. They indicate that their current pain level is 0/10. They have tried rest, sling, OT. Has been graduated from OT.      Patient's past medical, surgical, social, and family histories were reviewed today and no changes are noted.    REVIEW OF SYSTEMS:  Constitutional: NEGATIVE for fever, chills, change in weight  Skin: NEGATIVE for worrisome rashes, moles or lesions  GI/: NEGATIVE for bowel or bladder changes  Neuro: NEGATIVE for weakness, dizziness or paresthesias    OBJECTIVE:  /89   Wt 131.5 kg (290 lb)   BMI 41.61 kg/m     General: healthy, alert and in no distress  HEENT: no scleral icterus or conjunctival erythema  Skin: no suspicious lesions or rash. No jaundice.  CV: regular rhythm by palpation, no pedal edema  Resp: normal respiratory effort without conversational dyspnea   Psych: normal mood and affect  Gait: normal steady gait with appropriate coordination and balance  Neuro: normal light touch sensory exam of the extremities.    MSK:    RIGHT ELBOW  Inspection:    No swelling, bruising, discoloration, or obvious deformity or asymmetry  Palpation:  Nnontender.    Crepitus is Absent  Range of Motion:     Extension 15 deg / flexion 100 / pronation full / supination full  Strength:    No deficits in flexion, extension, pronation, or supination.  Special Tests:    Positive: none    Negative: Pain with resisted wrist extension, pain with resisted middle finger extension, pain with resisted wrist flexion, pain with resisted supination, pain with resisted pronation  Independent visualization of the  below image:        Mushtaq Caraballo MD, CAQSM  [facility]    Disclaimer: This note consists of symbols derived from keyboarding, dictation and/or voice recognition software. As a result, there may be errors in the script that have gone undetected. Please consider this when interpreting information found in this chart.        Again, thank you for allowing me to participate in the care of your patient.        Sincerely,        Mushtaq Caraballo MD

## 2023-02-14 NOTE — PROGRESS NOTES
During chart review, noted that patient was prescribed doxycycline BID for 10 days on 2/9/23.  Patient scheduled to receive Ocrevus on 2/15/2023.  Sent message to Mimi Trinidad RN for neurology and inquired if patient should postpone her infusion until she is off her antibiotics.  Mimi replied back and stated that if the patient's symptoms are improving and she is fever free, she is able to come for her infusion tomorrow.      LVM for patient with above information.  Provided scheduling number to call if she needs to reschedule.     Cassidy Thomas RN-BSN, PHN, OCN  [facility]

## 2023-02-15 ENCOUNTER — INFUSION THERAPY VISIT (OUTPATIENT)
Dept: INFUSION THERAPY | Facility: CLINIC | Age: 38
End: 2023-02-15
Payer: COMMERCIAL

## 2023-02-15 VITALS
HEART RATE: 80 BPM | DIASTOLIC BLOOD PRESSURE: 89 MMHG | WEIGHT: 288.6 LBS | SYSTOLIC BLOOD PRESSURE: 138 MMHG | RESPIRATION RATE: 14 BRPM | BODY MASS INDEX: 41.41 KG/M2 | TEMPERATURE: 98.3 F | OXYGEN SATURATION: 98 %

## 2023-02-15 DIAGNOSIS — G35 MS (MULTIPLE SCLEROSIS) (H): Primary | ICD-10-CM

## 2023-02-15 PROCEDURE — 99207 PR NO CHARGE LOS: CPT

## 2023-02-15 PROCEDURE — 96415 CHEMO IV INFUSION ADDL HR: CPT | Performed by: INTERNAL MEDICINE

## 2023-02-15 PROCEDURE — 96375 TX/PRO/DX INJ NEW DRUG ADDON: CPT | Performed by: INTERNAL MEDICINE

## 2023-02-15 PROCEDURE — 96413 CHEMO IV INFUSION 1 HR: CPT | Performed by: INTERNAL MEDICINE

## 2023-02-15 RX ORDER — ALBUTEROL SULFATE 90 UG/1
1-2 AEROSOL, METERED RESPIRATORY (INHALATION)
Status: CANCELLED
Start: 2023-08-14

## 2023-02-15 RX ORDER — METHYLPREDNISOLONE SODIUM SUCCINATE 125 MG/2ML
125 INJECTION, POWDER, LYOPHILIZED, FOR SOLUTION INTRAMUSCULAR; INTRAVENOUS ONCE
Status: COMPLETED | OUTPATIENT
Start: 2023-02-15 | End: 2023-02-15

## 2023-02-15 RX ORDER — METHYLPREDNISOLONE SODIUM SUCCINATE 125 MG/2ML
125 INJECTION, POWDER, LYOPHILIZED, FOR SOLUTION INTRAMUSCULAR; INTRAVENOUS ONCE
Status: CANCELLED | OUTPATIENT
Start: 2023-08-14

## 2023-02-15 RX ORDER — ACETAMINOPHEN 325 MG/1
650 TABLET ORAL ONCE
Status: COMPLETED | OUTPATIENT
Start: 2023-02-15 | End: 2023-02-15

## 2023-02-15 RX ORDER — ACETAMINOPHEN 325 MG/1
650 TABLET ORAL ONCE
Status: CANCELLED | OUTPATIENT
Start: 2023-08-14

## 2023-02-15 RX ORDER — DIPHENHYDRAMINE HYDROCHLORIDE 50 MG/ML
50 INJECTION INTRAMUSCULAR; INTRAVENOUS
Status: CANCELLED
Start: 2023-08-14

## 2023-02-15 RX ORDER — EPINEPHRINE 1 MG/ML
0.3 INJECTION, SOLUTION INTRAMUSCULAR; SUBCUTANEOUS EVERY 5 MIN PRN
Status: CANCELLED | OUTPATIENT
Start: 2023-08-14

## 2023-02-15 RX ORDER — DIPHENHYDRAMINE HYDROCHLORIDE 50 MG/ML
50 INJECTION INTRAMUSCULAR; INTRAVENOUS
Status: COMPLETED | OUTPATIENT
Start: 2023-02-15 | End: 2023-02-15

## 2023-02-15 RX ORDER — HEPARIN SODIUM,PORCINE 10 UNIT/ML
5 VIAL (ML) INTRAVENOUS
Status: CANCELLED | OUTPATIENT
Start: 2023-08-14

## 2023-02-15 RX ORDER — HEPARIN SODIUM (PORCINE) LOCK FLUSH IV SOLN 100 UNIT/ML 100 UNIT/ML
5 SOLUTION INTRAVENOUS
Status: CANCELLED | OUTPATIENT
Start: 2023-08-14

## 2023-02-15 RX ORDER — ALBUTEROL SULFATE 0.83 MG/ML
2.5 SOLUTION RESPIRATORY (INHALATION)
Status: CANCELLED | OUTPATIENT
Start: 2023-08-14

## 2023-02-15 RX ORDER — METHYLPREDNISOLONE SODIUM SUCCINATE 125 MG/2ML
125 INJECTION, POWDER, LYOPHILIZED, FOR SOLUTION INTRAMUSCULAR; INTRAVENOUS
Status: CANCELLED
Start: 2023-08-14

## 2023-02-15 RX ORDER — MEPERIDINE HYDROCHLORIDE 25 MG/ML
25 INJECTION INTRAMUSCULAR; INTRAVENOUS; SUBCUTANEOUS EVERY 30 MIN PRN
Status: CANCELLED | OUTPATIENT
Start: 2023-08-14

## 2023-02-15 RX ORDER — DIPHENHYDRAMINE HCL 25 MG
50 CAPSULE ORAL ONCE
Status: CANCELLED | OUTPATIENT
Start: 2023-08-14

## 2023-02-15 RX ORDER — DIPHENHYDRAMINE HCL 25 MG
50 CAPSULE ORAL ONCE
Status: COMPLETED | OUTPATIENT
Start: 2023-02-15 | End: 2023-02-15

## 2023-02-15 RX ADMIN — Medication 250 ML: at 09:59

## 2023-02-15 RX ADMIN — DIPHENHYDRAMINE HYDROCHLORIDE 25 MG: 50 INJECTION INTRAMUSCULAR; INTRAVENOUS at 13:10

## 2023-02-15 RX ADMIN — ACETAMINOPHEN 650 MG: 325 TABLET ORAL at 09:22

## 2023-02-15 RX ADMIN — Medication 50 MG: at 09:22

## 2023-02-15 RX ADMIN — METHYLPREDNISOLONE SODIUM SUCCINATE 125 MG: 125 INJECTION INTRAMUSCULAR; INTRAVENOUS at 10:00

## 2023-02-15 NOTE — PROGRESS NOTES
Infusion Nursing Note:  Stefania Ashton presents today for Non-rapid Ocrevus.    Patient seen by provider today: No   present during visit today: Not Applicable.    Note:  Patient reporting that she is still on an antibiotic for bronchitis but is feeling 100% better than she did last week. Slight residual cough, but no fever noted. Per neurology RNCC ok to proceed with ocrevus today.      Patient reported an itchy scalp at 200ml/hr. Infusion stopped and benadryl 25 mg IV given. Patient stated this frequently happens during these infusions.  See above nursing note for further details. Symptoms resolved and ocrevus restarted. Finished without further incident. In basket sent to  regarding incident.       Intravenous Access:  Peripheral IV placed.    Treatment Conditions:  Lab Results   Component Value Date    HGB 11.3 (L) 11/07/2022    WBC 5.1 11/07/2022    ANEU 2.9 06/17/2020    ANEUTAUTO 3.0 11/07/2022     11/07/2022      Lab Results   Component Value Date     05/09/2016    POTASSIUM 3.9 05/09/2016    CR 0.69 05/09/2016    MAKENNA 9.3 09/17/2006    BILITOTAL 0.3 11/07/2022    ALBUMIN 3.3 (L) 11/07/2022    ALT 15 11/07/2022    AST 15 11/07/2022     Results reviewed, labs MET treatment parameters, ok to proceed with treatment.  Biological Infusion Checklist:  ~~~ NOTE: If the patient answers yes to any of the questions below, hold the infusion and contact ordering provider or on-call provider.    1. Have you recently had an elevated temperature, fever, chills, productive cough, coughing for 3 weeks or longer or hemoptysis, abnormal vital signs, night sweats,  chest pain or have you noticed a decrease in your appetite, unexplained weight loss or fatigue? No  2. Do you have any open wounds or new incisions? No  3. Do you have any recent or upcoming hospitalizations, surgeries or dental procedures? No  4. Do you currently have or recently have had any signs of illness or infection or  are you on any antibiotics? Yes, MD aware of antibiotics for bronchitis  5. Have you had any new, sudden or worsening abdominal pain? No  6. Have you or anyone in your household received a live vaccination in the past 4 weeks? Please note:  No live vaccines while on biologic/chemotherapy until 6 months after the last treatment.  Patient can receive the flu vaccine (shot only) and the pneumovax.  It is optimal for the patient to get these vaccines mid cycle, but they can be given at any time as long as it is not on the day of the infusion. No  7. Have you recently been diagnosed with any new nervous system diseases (ie. Multiple sclerosis, Guillain Soso, seizures, neurological changes) or cancer diagnosis? No  8. Are you on any form of radiation or chemotherapy? No  9. Are you pregnant or breast feeding or do you have plans of pregnancy in the future? No  10. Have you been having any signs of worsening depression or suicidal ideations?  (benlysta only) No  11. Have there been any other new onset medical symptoms? No      Post Infusion Assessment:  Patient tolerated infusion poorly due to : Hypersensitivity: Did patient have a hypersensitivity reaction? : Yes  Drug or Product name: OCrevus  Were pre-meds administered?: Yes  What pre-meds were administered?: Acetaminophen (Tylenol);Diphenhydramine (Benadryl);Methylprednisolone  First or Subsequent treatment: Subsequent infusion  Rate of infusion when patient had hypersensitivity reaction: 200ml/hr  Time the hypersensitivity reaction was first recognized: 1306  Symptoms observed or reported (select all that apply): Itching  Interventions/treatment following reaction: Infusion stopped;Hypersensitivity medications administered  What hypersensitivity medications were administered?: DiphendydrAMINE (benadryl)  Name of provider notified: Grupo  Time provider notified: 1315  Type of notification (select all that apply): Other: (Comment);Provider at bedside (inbasket sent to  neurology MD)  Was the patient re-challenged today?: Yes - tolerated well  Patient observed for 20 minutes post Ocrevus per patient request as she had to pick her son up at school.     Blood return noted pre and post infusion.  No evidence of extravasations.  Access discontinued per protocol.  Biologic Infusion Post Education: Call the triage nurse at your clinic or seek medical attention if you have chills and/or temperature greater than or equal to 100.5, uncontrolled nausea/vomiting, diarrhea, constipation, dizziness, shortness of breath, chest pain, heart palpitations, weakness or any other new or concerning symptoms, questions or concerns.  You cannot have any live virus vaccines prior to or during treatment or up to 6 months post infusion.  If you have an upcoming surgery, medical procedure or dental procedure during treatment, this should be discussed with your ordering physician and your surgeon/dentist.  If you are having any concerning symptom, if you are unsure if you should get your next infusion or wish to speak to a provider before your next infusion, please call your care coordinator or triage nurse at your clinic to notify them so we can adequately serve you.     Discharge Plan:   Discharge instructions reviewed with: Patient.  Patient and/or family verbalized understanding of discharge instructions and all questions answered.  Patient discharged in stable condition accompanied by: self.  Departure Mode: Ambulatory.      Candelaria Barnes RN

## 2023-02-15 NOTE — PROGRESS NOTES
1306 patient put on call light with c/o itchy scalp.  Patient was aggresively scratching head. Denies scratchy throat, other areas of itchiness and difficulty breathing. VS checked.  Ocrevus stopped. Rate was at 200ml/hr with 1hr and 3 min remaining    1309 VS checked. No chest or throat tightness. Itching 50% better    1310 Benadryl 25 mg IV given, per charge nurse's recommendation.    1311 Itching almost resolved.

## 2023-05-01 ENCOUNTER — OFFICE VISIT (OUTPATIENT)
Dept: NEUROLOGY | Facility: CLINIC | Age: 38
End: 2023-05-01
Payer: COMMERCIAL

## 2023-05-01 VITALS
SYSTOLIC BLOOD PRESSURE: 126 MMHG | DIASTOLIC BLOOD PRESSURE: 78 MMHG | BODY MASS INDEX: 41.04 KG/M2 | HEART RATE: 73 BPM | WEIGHT: 286 LBS

## 2023-05-01 DIAGNOSIS — G35 MS (MULTIPLE SCLEROSIS) (H): Primary | ICD-10-CM

## 2023-05-01 DIAGNOSIS — E55.9 VITAMIN D DEFICIENCY: ICD-10-CM

## 2023-05-01 DIAGNOSIS — R39.15 URINARY URGENCY: ICD-10-CM

## 2023-05-01 PROCEDURE — 99214 OFFICE O/P EST MOD 30 MIN: CPT | Mod: GC | Performed by: PSYCHIATRY & NEUROLOGY

## 2023-05-01 RX ORDER — OXYBUTYNIN CHLORIDE 5 MG/1
5 TABLET ORAL 2 TIMES DAILY
Qty: 60 TABLET | Refills: 11 | Status: SHIPPED | OUTPATIENT
Start: 2023-05-01 | End: 2024-06-12

## 2023-05-01 NOTE — LETTER
2023      RE: Stefania Ashton  [address]  [address]  [place] [address]     [facility]   Multiple Sclerosis Clinic  Progress Note  Patient Name:  Stefania Ashton  MRN:  [id number]    :  1985  Date of Service:  May 1, 2023  Primary care provider:  Porsha Veliz    Patient Summary:   is a 38 year old-year-old female patient with a PMH significant for hypertension, hyperlipidemia, former tobacco use, multiple sclerosis who presents today for follow up.  Last ocrevus infusion 2/15/23, well tolerated.     Interval History:   Since she was last seen in clinic 6 months ago, she denies any new visual, sensory or motor symptoms suggestive of recurrent inflammation in the optic nerves, brainstem or spinal cord.  No numbness, weakness. No falls. No vision changes.     Some urinary urgency, more bothersome at night.     She was getting some reactions with the Ocrevus infusion with itching sensation. Blood pressure jumped up and she has some difficulty with speech/slurred speach. She received with a slower infusion rate and it has been going okay.      Intermittent left hand tingling, seems to be positional/upon awakening and then improves, has not had this for a while.     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   Previous disease modifying therapy:   Glatiramer acetate (radiologic progression)  Plegridy (radiologic progression)   Ocrelizumab started , last dose 2/15/23       Medications:     Current Outpatient Medications:      cetirizine (ZYRTEC) 10 MG tablet, Take 1 tablet (10 mg) by mouth daily, Disp: 30 tablet, Rfl: 11     EPINEPHrine (EPIPEN/ADRENACLICK/OR ANY BX GENERIC EQUIV) 0.3 MG/0.3ML injection 2-pack, Inject 0.3 mLs (0.3 mg) into the muscle as needed for anaphylaxis, Disp: 0.6 mL, Rfl: 3     vitamin D3 (CHOLECALCIFEROL) 2000 units tablet, Take 2 tablets by mouth daily, Disp: 30 tablet, Rfl: 3     nabumetone (RELAFEN)  500 MG tablet, Take 1 tablet (500 mg) by mouth 2 times daily (Patient not taking: Reported on 5/1/2023), Disp: 30 tablet, Rfl: 1    Exam: /78 (BP Location: Right arm, Patient Position: Sitting, Cuff Size: Adult Large)   Pulse 73   Wt 129.7 kg (286 lb)   BMI 41.04 kg/m    Constitutional: Alert. No acute distress.   Head: Atraumatic, normocephalic.  ENT: Moist mucous membranes. No sinus drainage.  Cardiovascular: Appears warm, well-perfused, and non-toxic.   Respiratory: No increased work of breathing, no accessory muscle use.   Skin: No rashes or lesions appreciated on visualized skin.   Hematologic/Lymphatic/Immunologic: No bruising appreciated on visualized skin.     Neurologic:  Mental Status: Alert. Speech is fluent, able to comprehend, and follows directions without difficulty in comprehension. No dysarthria.  Cranial nerves: Visual fields are full to confrontation. Pupils are round and react to light and there is no Mike Mendoza pupil. Extraocular movements are intact with no internuclear ophthalmoplegia or nystagmus. Smile and eyebrow raise equal, blinking is symmetric. Tongue midline. Hearing intact to casual conversation.   Motor: Strength is normal (5/5) in the following muscles/movements bilaterally: deltoids, biceps, triceps, wrist extensors, finger abductors, hip flexors, hamstrings, quads, and dorsiflexion/plantarflexion. There are no tremors, myoclonus or other abnormal movements appreciated. No pronator or sensory drift, though mild pronation without drift in left hand.   Reflexes: Reflexes are symmetric. No ankle clonus.  Sensory: light touch symmetric. Romberg with sway but no step out.  Coordination: No appendicular ataxia on finger to nose testing. HTS intact.   Gait: Gait is narrow based and steady. Patient is able to walk on heels, toes and in tandem without difficulty.       Data:     Vitamin D Deficiency Screening Results:  Lab Results   Component Value Date    VITDT 37 11/07/2022     "VITDT 29 11/01/2021    VITDT 36 06/17/2020    VITDT 39 09/05/2019    VITDT 44 11/08/2018           CBC RESULTS:   Recent Labs   Lab Test 11/07/22  1126   WBC 5.1   RBC 5.10   HGB 11.3*   HCT 35.6   MCV 70*   MCH 22.2*   MCHC 31.7   RDW 16.0*           Imaging:   No new imaging since last visit.     Assessment:   Ms. Stefania Ashton is a 38 year old woman who presents for follow up for multiple sclerosis. She remains stable on Ocrevus. She tolerated this well with a slower infusion rate to limit itching reaction.      Plan:   # Multiple sclerosis   -blood work prior to next infusion (CBC, IgG, hepatic panel, vit D)  -continue ocrevus (target date August 15)  -follow up 6 months in clinic    #Urinary urgency  -trial of oxybutynin 5mg BID (can adjust to at bedtime only if sufficient for symptomatic control as well)    #Vitamin D deficiency  Vitamin D level remains below our MS goal of 60-80 mcg/L. She has not been regularly taking vitamin D, we discussed today that we recommend increasing her level. She notes that she has difficulty remembering to take it everyday, we discussed that unlike most medications you can take an extra dose of vitamin D the next day if you miss a dose to \"catch up.\"  -recommend 5000units per day Vitamin D      Patient seen and discussed with attending neurologist Dr. Peace.    Diana Aldana MD  PGY4 Neurology    Attending physician: I saw and evaluated the patient with Dr. Aldana and edited the note. I agree with findings and plan of care as documented above.    Tico Peace MD   of Neurology  [facility]    Cc:  Porsha Veliz MD (PCP)  Patient  "

## 2023-05-01 NOTE — PATIENT INSTRUCTIONS
Proceed with next Ocrevus infusion in August on or about August 15, 2023    2.   Recommend that you take 5000 units of vitamin D daily    3.   Blood tests prior to next Ocrevus infusion    4.   We will give you a trial of oxybutynin 5 mg twice daily for urinary urgency and frequency    5.  Return to clinic in 6 months

## 2023-05-01 NOTE — PROGRESS NOTES
[facility]   Multiple Sclerosis Clinic  Progress Note  Patient Name:  Stefania Ashton  MRN:  [id number]    :  1985  Date of Service:  May 1, 2023  Primary care provider:  Porsha Veliz    Patient Summary:   is a 38 year old-year-old female patient with a PMH significant for hypertension, hyperlipidemia, former tobacco use, multiple sclerosis who presents today for follow up.  Last ocrevus infusion 2/15/23, well tolerated.     Interval History:   Since she was last seen in clinic 6 months ago, she denies any new visual, sensory or motor symptoms suggestive of recurrent inflammation in the optic nerves, brainstem or spinal cord.  No numbness, weakness. No falls. No vision changes.     Some urinary urgency, more bothersome at night.     She was getting some reactions with the Ocrevus infusion with itching sensation. Blood pressure jumped up and she has some difficulty with speech/slurred speach. She received with a slower infusion rate and it has been going okay.      Intermittent left hand tingling, seems to be positional/upon awakening and then improves, has not had this for a while.     Disease onset:  facial weakness, diagnosed 6 months later after 6th nerve palsy.   Previous disease modifying therapy:   Glatiramer acetate (radiologic progression)  Plegridy (radiologic progression)   Ocrelizumab started , last dose 2/15/23     Medications:     Current Outpatient Medications:      cetirizine (ZYRTEC) 10 MG tablet, Take 1 tablet (10 mg) by mouth daily, Disp: 30 tablet, Rfl: 11     EPINEPHrine (EPIPEN/ADRENACLICK/OR ANY BX GENERIC EQUIV) 0.3 MG/0.3ML injection 2-pack, Inject 0.3 mLs (0.3 mg) into the muscle as needed for anaphylaxis, Disp: 0.6 mL, Rfl: 3     vitamin D3 (CHOLECALCIFEROL) 2000 units tablet, Take 2 tablets by mouth daily, Disp: 30 tablet, Rfl: 3     nabumetone (RELAFEN) 500 MG tablet, Take 1 tablet (500 mg) by mouth 2 times daily (Patient not taking:  Reported on 5/1/2023), Disp: 30 tablet, Rfl: 1    Exam: /78 (BP Location: Right arm, Patient Position: Sitting, Cuff Size: Adult Large)   Pulse 73   Wt 129.7 kg (286 lb)   BMI 41.04 kg/m    Constitutional: Alert. No acute distress.   Head: Atraumatic, normocephalic.  ENT: Moist mucous membranes. No sinus drainage.  Cardiovascular: Appears warm, well-perfused, and non-toxic.   Respiratory: No increased work of breathing, no accessory muscle use.   Skin: No rashes or lesions appreciated on visualized skin.   Hematologic/Lymphatic/Immunologic: No bruising appreciated on visualized skin.     Neurologic:  Mental Status: Alert. Speech is fluent, able to comprehend, and follows directions without difficulty in comprehension. No dysarthria.  Cranial nerves: Visual fields are full to confrontation. Pupils are round and react to light and there is no Mike Mendoza pupil. Extraocular movements are intact with no internuclear ophthalmoplegia or nystagmus. Smile and eyebrow raise equal, blinking is symmetric. Tongue midline. Hearing intact to casual conversation.   Motor: Strength is normal (5/5) in the following muscles/movements bilaterally: deltoids, biceps, triceps, wrist extensors, finger abductors, hip flexors, hamstrings, quads, and dorsiflexion/plantarflexion. There are no tremors, myoclonus or other abnormal movements appreciated. No pronator or sensory drift, though mild pronation without drift in left hand.   Reflexes: Reflexes are symmetric. No ankle clonus.  Sensory: light touch symmetric. Romberg with sway but no step out.  Coordination: No appendicular ataxia on finger to nose testing. HTS intact.   Gait: Gait is narrow based and steady. Patient is able to walk on heels, toes and in tandem without difficulty.       Data:     Vitamin D Deficiency Screening Results:  Lab Results   Component Value Date    VITDT 37 11/07/2022    VITDT 29 11/01/2021    VITDT 36 06/17/2020    VITDT 39 09/05/2019    VITDT 44  "11/08/2018       CBC RESULTS:   Recent Labs   Lab Test 11/07/22  1126   WBC 5.1   RBC 5.10   HGB 11.3*   HCT 35.6   MCV 70*   MCH 22.2*   MCHC 31.7   RDW 16.0*           Imaging:   No new imaging since last visit.     Assessment:   Ms. Stefania Ashton is a 38 year old woman who presents for follow up for multiple sclerosis. She remains stable on Ocrevus. She tolerated this well with a slower infusion rate to limit itching reaction.      Plan:   # Multiple sclerosis   -blood work prior to next infusion (CBC, IgG, hepatic panel, vit D)  -continue ocrevus (target date August 15)  -follow up 6 months in clinic    #Urinary urgency  -trial of oxybutynin 5mg BID (can adjust to at bedtime only if sufficient for symptomatic control as well)    #Vitamin D deficiency  Vitamin D level remains below our MS goal of 60-80 mcg/L. She has not been regularly taking vitamin D, we discussed today that we recommend increasing her level. She notes that she has difficulty remembering to take it everyday, we discussed that unlike most medications you can take an extra dose of vitamin D the next day if you miss a dose to \"catch up.\"  -recommend 5000units per day Vitamin D      Patient seen and discussed with attending neurologist Dr. Peace.    Diana Aldana MD  PGY4 Neurology    Attending physician: I saw and evaluated the patient with Dr. Aldana and edited the note. I agree with findings and plan of care as documented above.    Tico Peace MD   of Neurology  [facility]        "

## 2023-05-01 NOTE — Clinical Note
2023         RE: Stefania Ashton  [address]  [address]  [place] [address]        Dear Colleague,    Thank you for referring your patient, Stefania Ashton, to the [facility] [place]. Please see a copy of my visit note below.    [facility]   Multiple Sclerosis Clinic  Progress Note  Patient Name:  Stefania Ashton  MRN:  [id number]    :  1985  Date of Service:  May 1, 2023  Primary care provider:  Porsha Veliz    Patient Summary:   is a 38 year old-year-old female patient with a PMH significant for hypertension, hyperlipidemia, former tobacco use, multiple sclerosis who presents today for follow up.  Last ocrevus infusion 2/15/23, well tolerated.     Interval History:   Since she was last seen in clinic 6 months ago, she denies any new visual, sensory or motor symptoms suggestive of recurrent inflammation in the optic nerves, brainstem or spinal cord.  No numbness, weakness. No falls. No vision changes.     Some urinary urgency, more bothersome at night.     She was getting some reactions with the Ocrevus infusion with itching sensation. Blood pressure jumped up and she has some difficulty with speech/slurred speach. She received with a slower infusion rate and it has been going okay.      Intermittent left hand tingling, seems to be positional/upon awakening and then improves, has not had this for a while.     Disease onset:  facial weakness, diagnosed 6 months later after 6th nerve palsy.   Previous disease modifying therapy:   Glatiramer acetate (radiologic progression)  Plegridy (radiologic progression)   Ocrelizumab started , last dose 2/15/23     Medications:     Current Outpatient Medications:      cetirizine (ZYRTEC) 10 MG tablet, Take 1 tablet (10 mg) by mouth daily, Disp: 30 tablet, Rfl: 11     EPINEPHrine (EPIPEN/ADRENACLICK/OR ANY BX GENERIC EQUIV) 0.3 MG/0.3ML injection 2-pack, Inject 0.3 mLs (0.3 mg) into the  muscle as needed for anaphylaxis, Disp: 0.6 mL, Rfl: 3     vitamin D3 (CHOLECALCIFEROL) 2000 units tablet, Take 2 tablets by mouth daily, Disp: 30 tablet, Rfl: 3     nabumetone (RELAFEN) 500 MG tablet, Take 1 tablet (500 mg) by mouth 2 times daily (Patient not taking: Reported on 5/1/2023), Disp: 30 tablet, Rfl: 1    Exam: /78 (BP Location: Right arm, Patient Position: Sitting, Cuff Size: Adult Large)   Pulse 73   Wt 129.7 kg (286 lb)   BMI 41.04 kg/m    Constitutional: Alert. No acute distress.   Head: Atraumatic, normocephalic.  ENT: Moist mucous membranes. No sinus drainage.  Cardiovascular: Appears warm, well-perfused, and non-toxic.   Respiratory: No increased work of breathing, no accessory muscle use.   Skin: No rashes or lesions appreciated on visualized skin.   Hematologic/Lymphatic/Immunologic: No bruising appreciated on visualized skin.     Neurologic:  Mental Status: Alert. Speech is fluent, able to comprehend, and follows directions without difficulty in comprehension. No dysarthria.  Cranial nerves: Visual fields are full to confrontation. Pupils are round and react to light and there is no Mike Mendoza pupil. Extraocular movements are intact with no internuclear ophthalmoplegia or nystagmus. Smile and eyebrow raise equal, blinking is symmetric. Tongue midline. Hearing intact to casual conversation.   Motor: Strength is normal (5/5) in the following muscles/movements bilaterally: deltoids, biceps, triceps, wrist extensors, finger abductors, hip flexors, hamstrings, quads, and dorsiflexion/plantarflexion. There are no tremors, myoclonus or other abnormal movements appreciated. No pronator or sensory drift, though mild pronation without drift in left hand.   Reflexes: Reflexes are symmetric. No ankle clonus.  Sensory: light touch symmetric. Romberg with sway but no step out.  Coordination: No appendicular ataxia on finger to nose testing. HTS intact.   Gait: Gait is narrow based and steady.  "Patient is able to walk on heels, toes and in tandem without difficulty.       Data:     Vitamin D Deficiency Screening Results:  Lab Results   Component Value Date    VITDT 37 11/07/2022    VITDT 29 11/01/2021    VITDT 36 06/17/2020    VITDT 39 09/05/2019    VITDT 44 11/08/2018       CBC RESULTS:   Recent Labs   Lab Test 11/07/22  1126   WBC 5.1   RBC 5.10   HGB 11.3*   HCT 35.6   MCV 70*   MCH 22.2*   MCHC 31.7   RDW 16.0*           Imaging:   No new imaging since last visit.     Assessment:   Ms. Stefania Ashton is a 38 year old woman who presents for follow up for multiple sclerosis. She remains stable on Ocrevus. She tolerated this well with a slower infusion rate to limit itching reaction.      Plan:   # Multiple sclerosis   -blood work prior to next infusion (CBC, IgG, hepatic panel, vit D)  -continue ocrevus (target date August 15)  -follow up 6 months in clinic    #Urinary urgency  -trial of oxybutynin 5mg BID (can adjust to at bedtime only if sufficient for symptomatic control as well)    #Vitamin D deficiency  Vitamin D level remains below our MS goal of 60-80 mcg/L. She has not been regularly taking vitamin D, we discussed today that we recommend increasing her level. She notes that she has difficulty remembering to take it everyday, we discussed that unlike most medications you can take an extra dose of vitamin D the next day if you miss a dose to \"catch up.\"  -recommend 5000units per day Vitamin D      Patient seen and discussed with attending neurologist Dr. Peace.    Diana Aldana MD  PGY4 Neurology    Attending physician: I saw and evaluated the patient with Dr. Aldana and edited the note. I agree with findings and plan of care as documented above.    Tico Peace MD   of Neurology  [facility]            Again, thank you for allowing me to participate in the care of your patient.        Sincerely,        Tico" MD Nata

## 2023-06-02 ENCOUNTER — HEALTH MAINTENANCE LETTER (OUTPATIENT)
Age: 38
End: 2023-06-02

## 2023-07-17 NOTE — PROGRESS NOTES
Hand Therapy Initial Evaluation    Current Date:  11/17/2022    Subjective:  Stefania Ashton is a 37 year old right hand dominant female.    Diagnosis:   Right radial head fracture  DOI:  10/25/2022  Therapy referral: 11/3/2022  Post:  3w 2d    Patient reports symptoms of pain, stiffness/loss of motion and weakness/loss of strength of the right elbow which occurred due to fall. Since onset symptoms are gradually getting better.  Special tests:  x-ray.  Previous treatment: sling x 1 week.  General health as reported by patient is good.  Pertinent medical history includes:None  Medical allergies:none.  Surgical history: none.  Medication history: see list in EMR.    Occupational Profile Information:  Current occupation is Para  Job Tasks: Computer work, lifting/carrying, prolonged sitting, driving and pushing/pulling  Prior functional level:  no limitations  Barriers include:none  Mobility: No difficulty  Transportation: drives    Functional Outcome Measure:  Upper Extremity Functional Index  SCORE:   Column Totals: 66/80  (A lower score indicates greater disability.)    Pain Level (Scale 0-10)   11/17/2022   At Rest 0   With Use 5-6     Pain Description  Date 11/17/2022   Location elbow and forearm   Pain Quality Sharp and Shooting   Frequency intermittent     Pain is worst  daytime   Exacerbated by  with use   Relieved by rest   Progression Gradually getting better     Edema  None    Sensation  WNL throughout all nerve distributions; per patient report    ROM  Pain Report: - none  + mild    ++ moderate    +++ severe   Elbow 11/17/2022 11/17/2022   AROM (PROM) Left Right   Extension 0 -25 (-20)   Flexion 150 130   Supination 70 30 (45)   Pronation 80 60     Strength  Contraindicated    Assessment:  Patient presents with symptoms consistent with diagnosis of elbow fracture, with conservative intervention.     Patient's limitations or Problem List includes:  Pain, Decreased ROM/motion, Weakness, Decreased  and  Tightness in musculature of the right elbow which interferes with the patient's ability to perform Self Care Tasks (dressing, eating, bathing), Work Tasks, Recreational Activities and Household Chores as compared to previous level of function.    Rehab Potential:  Excellent - Return to full activity, no limitations    Patient will benefit from skilled Occupational Therapy to increase ROM, flexibility, overall strength and  strength and decrease pain to return to previous activity level and resume normal daily tasks and to reach their rehab potential.    Barriers to Learning:  No barrier    Communication Issues:  Patient appears to be able to clearly communicate and understand verbal and written communication and follow directions correctly.    Chart Review: Chart Review and Simple history review with patient    Identified Performance Deficits: bathing/showering, dressing, home establishment and management, meal preparation and cleanup, work and leisure activities    Assessment of Occupational Performance:  5 or more Performance Deficits    Clinical Decision Making (Complexity): Low complexity    Treatment Explanation:  The following has been discussed with the patient:  RX ordered/plan of care  Anticipated outcomes  Possible risks and side effects    Plan:  Frequency:  1 X week, once daily  Duration:  for 8 weeks  Treatment Plan:    Modalities:    US and Paraffin   Therapeutic Exercise:    AROM, AAROM, PROM, Blocking, Isotonics and Isometrics  Therapeutic Activities:   Functional activities   Manual Techniques:   Joint mobilization and Myofascial release  Orthotic Fabrication:    Static progressive  Self Care:    Self Care Tasks    Discharge Plan:  Achieve all LTG.  Independent in home treatment program.  Reach maximal therapeutic benefit.    Home Exercise Program:  Warmth for stiffness  Self MFR to bicep to facilitate elbow extension   AROM elbow E/F and P/S  PROM elbow flexion and supination   PROM elbow  extension in gravity assisted position  Encourage functional use of right arm    Next Visit:  See in 1 week  Assess response to HEP  Add jt mobs to facilitate ROM  Progress to weight bearing to facilitate elbow extension  Add strengthening as tolerated    none

## 2023-08-14 ENCOUNTER — INFUSION THERAPY VISIT (OUTPATIENT)
Dept: INFUSION THERAPY | Facility: CLINIC | Age: 38
End: 2023-08-14
Attending: PSYCHIATRY & NEUROLOGY
Payer: COMMERCIAL

## 2023-08-14 ENCOUNTER — LAB (OUTPATIENT)
Dept: LAB | Facility: CLINIC | Age: 38
End: 2023-08-14
Payer: COMMERCIAL

## 2023-08-14 VITALS
OXYGEN SATURATION: 100 % | TEMPERATURE: 98 F | HEART RATE: 76 BPM | BODY MASS INDEX: 40.81 KG/M2 | SYSTOLIC BLOOD PRESSURE: 131 MMHG | WEIGHT: 284.4 LBS | DIASTOLIC BLOOD PRESSURE: 78 MMHG | RESPIRATION RATE: 16 BRPM

## 2023-08-14 DIAGNOSIS — G35 MS (MULTIPLE SCLEROSIS) (H): ICD-10-CM

## 2023-08-14 DIAGNOSIS — E55.9 VITAMIN D DEFICIENCY: ICD-10-CM

## 2023-08-14 DIAGNOSIS — G35 MS (MULTIPLE SCLEROSIS) (H): Primary | ICD-10-CM

## 2023-08-14 LAB
ALBUMIN SERPL BCG-MCNC: 4 G/DL (ref 3.5–5.2)
ALP SERPL-CCNC: 91 U/L (ref 35–104)
ALT SERPL W P-5'-P-CCNC: <5 U/L (ref 0–50)
AST SERPL W P-5'-P-CCNC: 16 U/L (ref 0–45)
BASOPHILS # BLD AUTO: 0 10E3/UL (ref 0–0.2)
BASOPHILS NFR BLD AUTO: 0 %
BILIRUB DIRECT SERPL-MCNC: <0.2 MG/DL (ref 0–0.3)
BILIRUB SERPL-MCNC: 0.2 MG/DL
EOSINOPHIL # BLD AUTO: 0.1 10E3/UL (ref 0–0.7)
EOSINOPHIL NFR BLD AUTO: 2 %
ERYTHROCYTE [DISTWIDTH] IN BLOOD BY AUTOMATED COUNT: 15.4 % (ref 10–15)
HCT VFR BLD AUTO: 36.7 % (ref 35–47)
HGB BLD-MCNC: 11.9 G/DL (ref 11.7–15.7)
IMM GRANULOCYTES # BLD: 0 10E3/UL
IMM GRANULOCYTES NFR BLD: 0 %
LYMPHOCYTES # BLD AUTO: 1.6 10E3/UL (ref 0.8–5.3)
LYMPHOCYTES NFR BLD AUTO: 31 %
MCH RBC QN AUTO: 22.5 PG (ref 26.5–33)
MCHC RBC AUTO-ENTMCNC: 32.4 G/DL (ref 31.5–36.5)
MCV RBC AUTO: 70 FL (ref 78–100)
MONOCYTES # BLD AUTO: 0.4 10E3/UL (ref 0–1.3)
MONOCYTES NFR BLD AUTO: 8 %
NEUTROPHILS # BLD AUTO: 3.2 10E3/UL (ref 1.6–8.3)
NEUTROPHILS NFR BLD AUTO: 59 %
NRBC # BLD AUTO: 0 10E3/UL
NRBC BLD AUTO-RTO: 0 /100
PLATELET # BLD AUTO: 297 10E3/UL (ref 150–450)
PROT SERPL-MCNC: 7.3 G/DL (ref 6.4–8.3)
RBC # BLD AUTO: 5.28 10E6/UL (ref 3.8–5.2)
WBC # BLD AUTO: 5.4 10E3/UL (ref 4–11)

## 2023-08-14 PROCEDURE — 99207 PR NO CHARGE LOS: CPT

## 2023-08-14 PROCEDURE — 82306 VITAMIN D 25 HYDROXY: CPT

## 2023-08-14 PROCEDURE — 36415 COLL VENOUS BLD VENIPUNCTURE: CPT

## 2023-08-14 PROCEDURE — 96415 CHEMO IV INFUSION ADDL HR: CPT | Performed by: PSYCHIATRY & NEUROLOGY

## 2023-08-14 PROCEDURE — 85025 COMPLETE CBC W/AUTO DIFF WBC: CPT

## 2023-08-14 PROCEDURE — 96375 TX/PRO/DX INJ NEW DRUG ADDON: CPT | Performed by: PSYCHIATRY & NEUROLOGY

## 2023-08-14 PROCEDURE — 96413 CHEMO IV INFUSION 1 HR: CPT | Performed by: PSYCHIATRY & NEUROLOGY

## 2023-08-14 PROCEDURE — 80076 HEPATIC FUNCTION PANEL: CPT

## 2023-08-14 PROCEDURE — 82784 ASSAY IGA/IGD/IGG/IGM EACH: CPT

## 2023-08-14 RX ORDER — METHYLPREDNISOLONE SODIUM SUCCINATE 125 MG/2ML
125 INJECTION, POWDER, LYOPHILIZED, FOR SOLUTION INTRAMUSCULAR; INTRAVENOUS
Status: CANCELLED
Start: 2024-02-10

## 2023-08-14 RX ORDER — ACETAMINOPHEN 325 MG/1
650 TABLET ORAL ONCE
Status: COMPLETED | OUTPATIENT
Start: 2023-08-14 | End: 2023-08-14

## 2023-08-14 RX ORDER — METHYLPREDNISOLONE SODIUM SUCCINATE 125 MG/2ML
125 INJECTION, POWDER, LYOPHILIZED, FOR SOLUTION INTRAMUSCULAR; INTRAVENOUS ONCE
Status: COMPLETED | OUTPATIENT
Start: 2023-08-14 | End: 2023-08-14

## 2023-08-14 RX ORDER — EPINEPHRINE 1 MG/ML
0.3 INJECTION, SOLUTION INTRAMUSCULAR; SUBCUTANEOUS EVERY 5 MIN PRN
Status: CANCELLED | OUTPATIENT
Start: 2024-02-10

## 2023-08-14 RX ORDER — DIPHENHYDRAMINE HCL 25 MG
50 CAPSULE ORAL ONCE
Status: COMPLETED | OUTPATIENT
Start: 2023-08-14 | End: 2023-08-14

## 2023-08-14 RX ORDER — MEPERIDINE HYDROCHLORIDE 25 MG/ML
25 INJECTION INTRAMUSCULAR; INTRAVENOUS; SUBCUTANEOUS EVERY 30 MIN PRN
Status: CANCELLED | OUTPATIENT
Start: 2024-02-10

## 2023-08-14 RX ORDER — HEPARIN SODIUM,PORCINE 10 UNIT/ML
5 VIAL (ML) INTRAVENOUS
Status: CANCELLED | OUTPATIENT
Start: 2024-02-10

## 2023-08-14 RX ORDER — METHYLPREDNISOLONE SODIUM SUCCINATE 125 MG/2ML
125 INJECTION, POWDER, LYOPHILIZED, FOR SOLUTION INTRAMUSCULAR; INTRAVENOUS ONCE
Status: CANCELLED | OUTPATIENT
Start: 2024-02-10

## 2023-08-14 RX ORDER — ALBUTEROL SULFATE 90 UG/1
1-2 AEROSOL, METERED RESPIRATORY (INHALATION)
Status: CANCELLED
Start: 2024-02-10

## 2023-08-14 RX ORDER — HEPARIN SODIUM (PORCINE) LOCK FLUSH IV SOLN 100 UNIT/ML 100 UNIT/ML
5 SOLUTION INTRAVENOUS
Status: CANCELLED | OUTPATIENT
Start: 2024-02-10

## 2023-08-14 RX ORDER — ACETAMINOPHEN 325 MG/1
650 TABLET ORAL ONCE
Status: CANCELLED | OUTPATIENT
Start: 2024-02-10

## 2023-08-14 RX ORDER — DIPHENHYDRAMINE HCL 25 MG
50 CAPSULE ORAL ONCE
Status: CANCELLED | OUTPATIENT
Start: 2024-02-10

## 2023-08-14 RX ORDER — DIPHENHYDRAMINE HYDROCHLORIDE 50 MG/ML
50 INJECTION INTRAMUSCULAR; INTRAVENOUS
Status: CANCELLED
Start: 2024-02-10

## 2023-08-14 RX ORDER — ALBUTEROL SULFATE 0.83 MG/ML
2.5 SOLUTION RESPIRATORY (INHALATION)
Status: CANCELLED | OUTPATIENT
Start: 2024-02-10

## 2023-08-14 RX ADMIN — ACETAMINOPHEN 650 MG: 325 TABLET ORAL at 09:13

## 2023-08-14 RX ADMIN — Medication 50 MG: at 09:13

## 2023-08-14 RX ADMIN — Medication 250 ML: at 09:14

## 2023-08-14 RX ADMIN — METHYLPREDNISOLONE SODIUM SUCCINATE 125 MG: 125 INJECTION INTRAMUSCULAR; INTRAVENOUS at 09:15

## 2023-08-14 NOTE — PROGRESS NOTES
Infusion Nursing Note:  Stefaniamandie Ashton presents today for Ocrevus.    Patient seen by provider today: No   present during visit today: Not Applicable.    Note: Premedications of tylenol, benadryl and solumedrol given.  Ocrevus rates:   Start rate at 40 mL/hr, increase by 40 mL/hr every 30 minutes to a max of 200 mL/hr.   Patient tolerated Ocrevus today at the slower rates.     Intravenous Access:  Peripheral IV placed.    Treatment Conditions:  Biological Infusion Checklist:  ~~~ NOTE: If the patient answers yes to any of the questions below, hold the infusion and contact ordering provider or on-call provider.    Have you recently had an elevated temperature, fever, chills, productive cough, coughing for 3 weeks or longer or hemoptysis,  abnormal vital signs, night sweats,  chest pain or have you noticed a decrease in your appetite, unexplained weight loss or fatigue? No  Do you have any open wounds or new incisions? No  Do you have any upcoming hospitalizations or surgeries? Does not include esophagogastroduodenoscopy, colonoscopy, endoscopic retrograde cholangiopancreatography (ERCP), endoscopic ultrasound (EUS), dental procedures or joint aspiration/steroid injections No  Do you currently have any signs of illness or infection or are you on any antibiotics? No  Have you had any new, sudden or worsening abdominal pain? No  Have you or anyone in your household received a live vaccination in the past 4 weeks? Please note: No live vaccines while on biologic/chemotherapy until 6 months after the last treatment. Patient can receive the flu vaccine (shot only), pneumovax and the Covid vaccine. It is optimal for the patient to get these vaccines mid cycle, but they can be given at any time as long as it is not on the day of the infusion. No  Have you recently been diagnosed with any new nervous system diseases (ie. Multiple sclerosis, Guillain [place], seizures, neurological changes) or cancer diagnosis? Are  you on any form of radiation or chemotherapy? No  Are you pregnant or breast feeding or do you have plans of pregnancy in the future? No  Have there been any other new onset medical symptoms? No    Post Infusion Assessment:  Patient tolerated infusion without incident.  Patient observed for 60 minutes post Ocrevus per protocol.  Site patent and intact, free from redness, edema or discomfort.  No evidence of extravasations.  Access discontinued per protocol.  Biologic Infusion Post Education: Call the triage nurse at your clinic or seek medical attention if you have chills and/or temperature greater than or equal to 100.5, uncontrolled nausea/vomiting, diarrhea, constipation, dizziness, shortness of breath, chest pain, heart palpitations, weakness or any other new or concerning symptoms, questions or concerns.  You cannot have any live virus vaccines prior to or during treatment or up to 6 months post infusion.  If you have an upcoming surgery, medical procedure or dental procedure during treatment, this should be discussed with your ordering physician and your surgeon/dentist.  If you are having any concerning symptom, if you are unsure if you should get your next infusion or wish to speak to a provider before your next infusion, please call your care coordinator or triage nurse at your clinic to notify them so we can adequately serve you.       Discharge Plan:   Discharge instructions reviewed with: Patient.  Patient and/or family verbalized understanding of discharge instructions and all questions answered.  Patient discharged in stable condition accompanied by: self.  Departure Mode: Ambulatory.      Lu Murphy RN

## 2023-08-15 LAB
DEPRECATED CALCIDIOL+CALCIFEROL SERPL-MC: 46 UG/L (ref 20–75)
IGG SERPL-MCNC: 1190 MG/DL (ref 610–1616)

## 2023-11-06 ENCOUNTER — TELEPHONE (OUTPATIENT)
Dept: NEUROLOGY | Facility: CLINIC | Age: 38
End: 2023-11-06

## 2023-11-06 ENCOUNTER — OFFICE VISIT (OUTPATIENT)
Dept: NEUROLOGY | Facility: CLINIC | Age: 38
End: 2023-11-06
Payer: COMMERCIAL

## 2023-11-06 VITALS
BODY MASS INDEX: 40.53 KG/M2 | DIASTOLIC BLOOD PRESSURE: 73 MMHG | HEART RATE: 82 BPM | WEIGHT: 282.5 LBS | SYSTOLIC BLOOD PRESSURE: 119 MMHG

## 2023-11-06 DIAGNOSIS — R26.9 GAIT DISTURBANCE: ICD-10-CM

## 2023-11-06 DIAGNOSIS — G35 MS (MULTIPLE SCLEROSIS) (H): Primary | ICD-10-CM

## 2023-11-06 DIAGNOSIS — E55.9 VITAMIN D DEFICIENCY: ICD-10-CM

## 2023-11-06 PROCEDURE — 99214 OFFICE O/P EST MOD 30 MIN: CPT | Performed by: PSYCHIATRY & NEUROLOGY

## 2023-11-06 RX ORDER — HEPARIN SODIUM (PORCINE) LOCK FLUSH IV SOLN 100 UNIT/ML 100 UNIT/ML
5 SOLUTION INTRAVENOUS
Status: CANCELLED | OUTPATIENT
Start: 2024-02-01

## 2023-11-06 RX ORDER — HEPARIN SODIUM,PORCINE 10 UNIT/ML
5-20 VIAL (ML) INTRAVENOUS DAILY PRN
Status: CANCELLED | OUTPATIENT
Start: 2024-02-01

## 2023-11-06 RX ORDER — ALBUTEROL SULFATE 0.83 MG/ML
2.5 SOLUTION RESPIRATORY (INHALATION)
Status: CANCELLED | OUTPATIENT
Start: 2024-02-01

## 2023-11-06 RX ORDER — MEPERIDINE HYDROCHLORIDE 25 MG/ML
25 INJECTION INTRAMUSCULAR; INTRAVENOUS; SUBCUTANEOUS EVERY 30 MIN PRN
Status: CANCELLED | OUTPATIENT
Start: 2024-02-01

## 2023-11-06 RX ORDER — DIPHENHYDRAMINE HCL 25 MG
50 CAPSULE ORAL ONCE
Status: CANCELLED | OUTPATIENT
Start: 2024-02-01

## 2023-11-06 RX ORDER — ALBUTEROL SULFATE 90 UG/1
1-2 AEROSOL, METERED RESPIRATORY (INHALATION)
Status: CANCELLED
Start: 2024-02-01

## 2023-11-06 RX ORDER — METHYLPREDNISOLONE SODIUM SUCCINATE 125 MG/2ML
125 INJECTION, POWDER, LYOPHILIZED, FOR SOLUTION INTRAMUSCULAR; INTRAVENOUS ONCE
Status: CANCELLED | OUTPATIENT
Start: 2024-02-01

## 2023-11-06 RX ORDER — METHYLPREDNISOLONE SODIUM SUCCINATE 125 MG/2ML
125 INJECTION, POWDER, LYOPHILIZED, FOR SOLUTION INTRAMUSCULAR; INTRAVENOUS
Status: CANCELLED
Start: 2024-02-01

## 2023-11-06 RX ORDER — ACETAMINOPHEN 325 MG/1
650 TABLET ORAL ONCE
Status: CANCELLED | OUTPATIENT
Start: 2024-02-01

## 2023-11-06 RX ORDER — EPINEPHRINE 1 MG/ML
0.3 INJECTION, SOLUTION INTRAMUSCULAR; SUBCUTANEOUS EVERY 5 MIN PRN
Status: CANCELLED | OUTPATIENT
Start: 2024-02-01

## 2023-11-06 RX ORDER — DIPHENHYDRAMINE HYDROCHLORIDE 50 MG/ML
50 INJECTION INTRAMUSCULAR; INTRAVENOUS
Status: CANCELLED
Start: 2024-02-01

## 2023-11-06 NOTE — PATIENT INSTRUCTIONS
Recommend that you alternate taking 5000 units of vitamin D with 10,000 units (two pills) every other day     2.   Proceed with next Ocrevus infusion in February as scheduled    3.   Return to clinic in 6 months

## 2023-11-06 NOTE — LETTER
11/6/2023      RE: Stefania Ashton  [address]  [address]  [place] [address]     Referral source: Established patient    Chief complaint: Multiple sclerosis    History of the Present Illness: Ms. Stefania Ashton is a 38 year old woman who presents to the Multiple Sclerosis Clinic today for a scheduled follow up visit regarding her diagnosis of multiple sclerosis.    The patient's history is as per my previous notes. Initial onset of symptoms of demyelinating disease was in 2015 when she had an episode of facial weakness. Several months later, she developed a new sixth nerve palsy, and MRI and CSF studies done at that time were consistent with multiple sclerosis. She was initially treated with glatiramer acetate, but had ongoing radiologic disease activity on that medication, leading to transition to the Plegridy formulation of interferon beta. She had further radiologic progression on that medication as well, and most recently was transitioned to ocrelizumab in 2019, with the last infusion completed on 8/14/2023.    Today, she continues to deny any new episodic changes in vision, balance, strength or sensation suggestive of new relapse of multiple sclerosis since she was last seen.    Symptomatically, she relates that balance is stable and denies any recent falls.    PHYSICAL EXAMINATION:  VITAL SIGNS: Blood pressure 119/73; pulse 82; weight 128.1 kg.  GENERAL: Obese woman who presents to the evaluation alone, awake and alert and in no acute distress.    Investigations: I reviewed the results of laboratory test performed on the date of the last ocrelizumab infusion on 8/14/2023.  Total antibody (IgG) level and hepatic panel were normal.  Complete blood counts are suggestive of relative iron deficiency with MCV 70, which is a chronic finding that is presumably unrelated to her treatment with ocrelizumab.  Vitamin D level was 46 mcg/L.    Assessment/plan:    1. Multiple sclerosis  The patient is clinically stable  with no evidence of active inflammatory demyelination on current disease modifying therapy with ocrelizumab, which she will continue, with the next infusion to be performed as scheduled on 2/13/2024.    I would like to see her back in 6 months for a review.    2. Gait disturbance  Presently she is compensating well for balance difficulties.  We will continue to keep a close eye on this symptom over time.    3. Vitamin D deficiency  Vitamin D level is normal but below our goal range of 60-80 on current dose of 5000 International Units daily.  I advised her to alternate taking 5000 units with 10,000 units every other day.    Tico Peace MD   of Neurology  [facility]    Cc:  Porsha Veliz MD (PCP)  Patient

## 2023-11-06 NOTE — TELEPHONE ENCOUNTER
Ocrevus therapy plan to be renewed. Next infusion scheduled Feb 2024. Routed to provider for signature.       Mimi Trinidad, RNCC  Neurology/Neurosurgery

## 2023-11-06 NOTE — Clinical Note
11/6/2023         RE: Stefania Ashton  [address]  [address]  [place] [address]        Dear Colleague,    Thank you for referring your patient, Stefania Ashton, to the [facility]UROLOG[facility] [place]. Please see a copy of my visit note below.    Referral source: Established patient    Chief complaint: Multiple sclerosis    History of the Present Illness: Ms. Stefania Ashton is a 38 year old woman who presents to the Multiple Sclerosis Clinic today for a scheduled follow up visit regarding her diagnosis of multiple sclerosis.    The patient's history is as per my previous notes. Initial onset of symptoms of demyelinating disease was in 2015 when she had an episode of facial weakness. Several months later, she developed a new sixth nerve palsy, and MRI and CSF studies done at that time were consistent with multiple sclerosis. She was initially treated with glatiramer acetate, but had ongoing radiologic disease activity on that medication, leading to transition to the Plegridy formulation of interferon beta. She had further radiologic progression on that medication as well, and most recently was transitioned to ocrelizumab in 2019, with the last infusion completed on 8/14/2023.    Today, she continues to deny any new episodic changes in vision, balance, strength or sensation suggestive of new relapse of multiple sclerosis since she was last seen.    Symptomatically, she relates that balance is stable and denies any recent falls.    PHYSICAL EXAMINATION:  VITAL SIGNS: Blood pressure 119/73; pulse 82; weight 128.1 kg.  GENERAL: Obese woman who presents to the evaluation alone, awake and alert and in no acute distress.    Investigations: I reviewed the results of laboratory test performed on the date of the last ocrelizumab infusion on 8/14/2023.  Total antibody (IgG) level and hepatic panel were normal.  Complete blood counts are suggestive of relative iron deficiency with MCV 70, which is a  chronic finding that is presumably unrelated to her treatment with ocrelizumab.  Vitamin D level was 46 mcg/L.    Assessment/plan:    1. Multiple sclerosis  The patient is clinically stable with no evidence of active inflammatory demyelination on current disease modifying therapy with ocrelizumab, which she will continue, with the next infusion to be performed as scheduled on 2/13/2024.    I would like to see her back in 6 months for a review.    2. Gait disturbance  Presently she is compensating well for balance difficulties.  We will continue to keep a close eye on this symptom over time.    3. Vitamin D deficiency  Vitamin D level is normal but below our goal range of 60-80 on current dose of 5000 International Units daily.  I advised her to alternate taking 5000 units with 10,000 units every other day.      Again, thank you for allowing me to participate in the care of your patient.        Sincerely,        Tico Peace MD

## 2024-01-30 NOTE — PROGRESS NOTES
Referral source: Established patient    Chief complaint: Multiple sclerosis    History of the Present Illness: Ms. Stefania Ashton is a 38 year old woman who presents to the Multiple Sclerosis Clinic today for a scheduled follow up visit regarding her diagnosis of multiple sclerosis.    The patient's history is as per my previous notes. Initial onset of symptoms of demyelinating disease was in 2015 when she had an episode of facial weakness. Several months later, she developed a new sixth nerve palsy, and MRI and CSF studies done at that time were consistent with multiple sclerosis. She was initially treated with glatiramer acetate, but had ongoing radiologic disease activity on that medication, leading to transition to the Plegridy formulation of interferon beta. She had further radiologic progression on that medication as well, and most recently was transitioned to ocrelizumab in 2019, with the last infusion completed on 8/14/2023.    Today, she continues to deny any new episodic changes in vision, balance, strength or sensation suggestive of new relapse of multiple sclerosis since she was last seen.    Symptomatically, she relates that balance is stable and denies any recent falls.    PHYSICAL EXAMINATION:  VITAL SIGNS: Blood pressure 119/73; pulse 82; weight 128.1 kg.  GENERAL: Obese woman who presents to the evaluation alone, awake and alert and in no acute distress.    Investigations: I reviewed the results of laboratory test performed on the date of the last ocrelizumab infusion on 8/14/2023.  Total antibody (IgG) level and hepatic panel were normal.  Complete blood counts are suggestive of relative iron deficiency with MCV 70, which is a chronic finding that is presumably unrelated to her treatment with ocrelizumab.  Vitamin D level was 46 mcg/L.    Assessment/plan:    1. Multiple sclerosis  The patient is clinically stable with no evidence of active inflammatory demyelination on current disease modifying  therapy with ocrelizumab, which she will continue, with the next infusion to be performed as scheduled on 2/13/2024.    I would like to see her back in 6 months for a review.    2. Gait disturbance  Presently she is compensating well for balance difficulties.  We will continue to keep a close eye on this symptom over time.    3. Vitamin D deficiency  Vitamin D level is normal but below our goal range of 60-80 on current dose of 5000 International Units daily.  I advised her to alternate taking 5000 units with 10,000 units every other day.

## 2024-02-13 ENCOUNTER — INFUSION THERAPY VISIT (OUTPATIENT)
Dept: INFUSION THERAPY | Facility: CLINIC | Age: 39
End: 2024-02-13
Attending: NURSE PRACTITIONER
Payer: COMMERCIAL

## 2024-02-13 DIAGNOSIS — G35 MS (MULTIPLE SCLEROSIS) (H): Primary | ICD-10-CM

## 2024-02-13 PROCEDURE — 99207 PR NO CHARGE LOS: CPT

## 2024-02-13 NOTE — PROGRESS NOTES
Infusion Nursing Note:  Stefania Ashton presents today for Ocrevus -not given.    Patient seen by provider today: No   present during visit today: Not Applicable.    Note: During patient assessment, patient stated her 14yr old son would have to be dropped off during her appt today. Explained the policy to the patient that children under 18yr old are unable to be in the infusion center as a visitor due to the risk of a patient medication reaction. Patient made a phone call and stated she had no other options today for her son. Patient stated she needed to reschedule todays appt. Patient sent to the scheduling desk to reschedule today's infusion.     Intravenous Access:  Peripheral IV placed.    Treatment Conditions:  Biological Infusion Checklist:  ~~~ NOTE: If the patient answers yes to any of the questions below, hold the infusion and contact ordering provider or on-call provider.    Have you recently had an elevated temperature, fever, chills, productive cough, coughing for 3 weeks or longer or hemoptysis,  abnormal vital signs, night sweats,  chest pain or have you noticed a decrease in your appetite, unexplained weight loss or fatigue? No  Do you have any open wounds or new incisions? No  Do you have any upcoming hospitalizations or surgeries? Does not include esophagogastroduodenoscopy, colonoscopy, endoscopic retrograde cholangiopancreatography (ERCP), endoscopic ultrasound (EUS), dental procedures or joint aspiration/steroid injections No  Do you currently have any signs of illness or infection or are you on any antibiotics? No  Have you had any new, sudden or worsening abdominal pain? No  Have you or anyone in your household received a live vaccination in the past 4 weeks? Please note: No live vaccines while on biologic/chemotherapy until 6 months after the last treatment. Patient can receive the flu vaccine (shot only), pneumovax and the Covid vaccine. It is optimal for the patient to get these  vaccines mid cycle, but they can be given at any time as long as it is not on the day of the infusion. No  Have you recently been diagnosed with any new nervous system diseases (ie. Multiple sclerosis, Guillain [place], seizures, neurological changes) or cancer diagnosis? Are you on any form of radiation or chemotherapy? No  Are you pregnant or breast feeding or do you have plans of pregnancy in the future? No  Have there been any other new onset medical symptoms? No    Discharge Plan:   Patient is going to stop at the scheduling desk and reschedule the [employer] appt. See note above.       Lu Murphy RN

## 2024-02-27 ENCOUNTER — INFUSION THERAPY VISIT (OUTPATIENT)
Dept: INFUSION THERAPY | Facility: CLINIC | Age: 39
End: 2024-02-27
Attending: PSYCHIATRY & NEUROLOGY
Payer: COMMERCIAL

## 2024-02-27 VITALS
TEMPERATURE: 97.9 F | DIASTOLIC BLOOD PRESSURE: 84 MMHG | SYSTOLIC BLOOD PRESSURE: 125 MMHG | HEART RATE: 87 BPM | WEIGHT: 289.1 LBS | OXYGEN SATURATION: 96 % | BODY MASS INDEX: 41.48 KG/M2 | RESPIRATION RATE: 18 BRPM

## 2024-02-27 DIAGNOSIS — G35 MS (MULTIPLE SCLEROSIS) (H): Primary | ICD-10-CM

## 2024-02-27 PROCEDURE — 258N000003 HC RX IP 258 OP 636: Performed by: PSYCHIATRY & NEUROLOGY

## 2024-02-27 PROCEDURE — 99207 PR NO CHARGE LOS: CPT

## 2024-02-27 PROCEDURE — 96375 TX/PRO/DX INJ NEW DRUG ADDON: CPT

## 2024-02-27 PROCEDURE — 96365 THER/PROPH/DIAG IV INF INIT: CPT

## 2024-02-27 PROCEDURE — 250N000013 HC RX MED GY IP 250 OP 250 PS 637: Performed by: PSYCHIATRY & NEUROLOGY

## 2024-02-27 PROCEDURE — 250N000011 HC RX IP 250 OP 636: Mod: JZ | Performed by: PSYCHIATRY & NEUROLOGY

## 2024-02-27 PROCEDURE — 96366 THER/PROPH/DIAG IV INF ADDON: CPT

## 2024-02-27 RX ORDER — ALBUTEROL SULFATE 90 UG/1
1-2 AEROSOL, METERED RESPIRATORY (INHALATION)
Status: CANCELLED
Start: 2024-08-25

## 2024-02-27 RX ORDER — HEPARIN SODIUM (PORCINE) LOCK FLUSH IV SOLN 100 UNIT/ML 100 UNIT/ML
5 SOLUTION INTRAVENOUS
Status: CANCELLED | OUTPATIENT
Start: 2024-08-25

## 2024-02-27 RX ORDER — METHYLPREDNISOLONE SODIUM SUCCINATE 125 MG/2ML
125 INJECTION, POWDER, LYOPHILIZED, FOR SOLUTION INTRAMUSCULAR; INTRAVENOUS
Status: CANCELLED
Start: 2024-08-25

## 2024-02-27 RX ORDER — DIPHENHYDRAMINE HCL 25 MG
50 CAPSULE ORAL ONCE
Status: CANCELLED | OUTPATIENT
Start: 2024-08-25

## 2024-02-27 RX ORDER — ALBUTEROL SULFATE 0.83 MG/ML
2.5 SOLUTION RESPIRATORY (INHALATION)
Status: CANCELLED | OUTPATIENT
Start: 2024-08-25

## 2024-02-27 RX ORDER — HEPARIN SODIUM,PORCINE 10 UNIT/ML
5-20 VIAL (ML) INTRAVENOUS DAILY PRN
Status: CANCELLED | OUTPATIENT
Start: 2024-08-25

## 2024-02-27 RX ORDER — ACETAMINOPHEN 325 MG/1
650 TABLET ORAL ONCE
Status: CANCELLED | OUTPATIENT
Start: 2024-08-25

## 2024-02-27 RX ORDER — METHYLPREDNISOLONE SODIUM SUCCINATE 125 MG/2ML
125 INJECTION, POWDER, LYOPHILIZED, FOR SOLUTION INTRAMUSCULAR; INTRAVENOUS ONCE
Status: COMPLETED | OUTPATIENT
Start: 2024-02-27 | End: 2024-02-27

## 2024-02-27 RX ORDER — ACETAMINOPHEN 325 MG/1
650 TABLET ORAL ONCE
Status: COMPLETED | OUTPATIENT
Start: 2024-02-27 | End: 2024-02-27

## 2024-02-27 RX ORDER — MEPERIDINE HYDROCHLORIDE 25 MG/ML
25 INJECTION INTRAMUSCULAR; INTRAVENOUS; SUBCUTANEOUS EVERY 30 MIN PRN
Status: CANCELLED | OUTPATIENT
Start: 2024-08-25

## 2024-02-27 RX ORDER — EPINEPHRINE 1 MG/ML
0.3 INJECTION, SOLUTION INTRAMUSCULAR; SUBCUTANEOUS EVERY 5 MIN PRN
Status: CANCELLED | OUTPATIENT
Start: 2024-08-25

## 2024-02-27 RX ORDER — METHYLPREDNISOLONE SODIUM SUCCINATE 125 MG/2ML
125 INJECTION, POWDER, LYOPHILIZED, FOR SOLUTION INTRAMUSCULAR; INTRAVENOUS ONCE
Status: CANCELLED | OUTPATIENT
Start: 2024-08-25

## 2024-02-27 RX ORDER — DIPHENHYDRAMINE HYDROCHLORIDE 50 MG/ML
50 INJECTION INTRAMUSCULAR; INTRAVENOUS
Status: CANCELLED
Start: 2024-08-25

## 2024-02-27 RX ORDER — DIPHENHYDRAMINE HCL 25 MG
50 CAPSULE ORAL ONCE
Status: COMPLETED | OUTPATIENT
Start: 2024-02-27 | End: 2024-02-27

## 2024-02-27 RX ADMIN — DIPHENHYDRAMINE HYDROCHLORIDE 50 MG: 25 CAPSULE ORAL at 08:41

## 2024-02-27 RX ADMIN — SODIUM CHLORIDE 250 ML: 9 INJECTION, SOLUTION INTRAVENOUS at 08:37

## 2024-02-27 RX ADMIN — ACETAMINOPHEN 650 MG: 325 TABLET ORAL at 08:41

## 2024-02-27 RX ADMIN — METHYLPREDNISOLONE SODIUM SUCCINATE 125 MG: 125 INJECTION, POWDER, FOR SOLUTION INTRAMUSCULAR; INTRAVENOUS at 08:42

## 2024-02-27 RX ADMIN — OCRELIZUMAB 600 MG: 300 INJECTION INTRAVENOUS at 09:15

## 2024-02-27 NOTE — PROGRESS NOTES
Infusion Nursing Note:  Stefania Ashton presents today for Non- Rapid Ocrevus.    Patient seen by provider today: No   present during visit today: Not Applicable.    Note: Patient expressed overall no new medical concerns. Patient tolerated slower rates with last infusion therefore continued with this rate today.    Started at a rate at 40 mL/hr and increased by 40 mL/hr every 30 minutes to a max of 200 mL/hr.     Intravenous Access:  Peripheral IV placed.    Treatment Conditions:  Biological Infusion Checklist:  ~~~ NOTE: If the patient answers yes to any of the questions below, hold the infusion and contact ordering provider or on-call provider.    Have you recently had an elevated temperature, fever, chills, productive cough, coughing for 3 weeks or longer or hemoptysis,  abnormal vital signs, night sweats,  chest pain or have you noticed a decrease in your appetite, unexplained weight loss or fatigue? No  Do you have any open wounds or new incisions? No  Do you have any upcoming hospitalizations or surgeries? Does not include esophagogastroduodenoscopy, colonoscopy, endoscopic retrograde cholangiopancreatography (ERCP), endoscopic ultrasound (EUS), dental procedures or joint aspiration/steroid injections No  Do you currently have any signs of illness or infection or are you on any antibiotics? No  Have you had any new, sudden or worsening abdominal pain? No  Have you or anyone in your household received a live vaccination in the past 4 weeks? Please note: No live vaccines while on biologic/chemotherapy until 6 months after the last treatment. Patient can receive the flu vaccine (shot only), pneumovax and the Covid vaccine. It is optimal for the patient to get these vaccines mid cycle, but they can be given at any time as long as it is not on the day of the infusion. No  Have you recently been diagnosed with any new nervous system diseases (ie. Multiple sclerosis, Guillain [place], seizures,  neurological changes) or cancer diagnosis? Are you on any form of radiation or chemotherapy? No  Are you pregnant or breast feeding or do you have plans of pregnancy in the future? No  Have there been any other new onset medical symptoms? No      Post Infusion Assessment:  Patient tolerated infusion without incident.  Patient observed for 1 hour post Ocrevus per protocol.  Blood return noted pre and post infusion.  Site patent and intact, free from redness, edema or discomfort.  No evidence of extravasations.  Access discontinued per protocol.       Discharge Plan:   AVS to patient via MYCHART.  Patient will return 8/26/24 for next appointment.   Patient discharged in stable condition accompanied by: self.  Departure Mode: Ambulatory.      Airam Mora RN

## 2024-05-06 ENCOUNTER — OFFICE VISIT (OUTPATIENT)
Dept: NEUROLOGY | Facility: CLINIC | Age: 39
End: 2024-05-06
Payer: MEDICAID

## 2024-05-06 VITALS
HEART RATE: 68 BPM | BODY MASS INDEX: 41.75 KG/M2 | WEIGHT: 291 LBS | DIASTOLIC BLOOD PRESSURE: 86 MMHG | SYSTOLIC BLOOD PRESSURE: 130 MMHG

## 2024-05-06 DIAGNOSIS — G35 MS (MULTIPLE SCLEROSIS) (H): Primary | ICD-10-CM

## 2024-05-06 DIAGNOSIS — E55.9 VITAMIN D DEFICIENCY: ICD-10-CM

## 2024-05-06 PROCEDURE — G2211 COMPLEX E/M VISIT ADD ON: HCPCS | Performed by: PSYCHIATRY & NEUROLOGY

## 2024-05-06 PROCEDURE — 99214 OFFICE O/P EST MOD 30 MIN: CPT | Performed by: PSYCHIATRY & NEUROLOGY

## 2024-05-06 NOTE — Clinical Note
5/6/2024      Stefania Ashton  [address]  [address]  [place] [address]      Dear Colleague,    Thank you for referring your patient, Stefania Ashton, to the [facility]UROLOGY [facility] [place]. Please see a copy of my visit note below.    No notes on file    Again, thank you for allowing me to participate in the care of your patient.        Sincerely,        Tico Peace MD

## 2024-05-06 NOTE — PATIENT INSTRUCTIONS
Proceed with next Ocrevus infusion as schedule in August 2.   Blood tests on day of next infusion    3.   MRI scans of the brain and cervical spine in 6 months and return to clinic after

## 2024-06-12 ENCOUNTER — OFFICE VISIT (OUTPATIENT)
Dept: INTERNAL MEDICINE | Facility: CLINIC | Age: 39
End: 2024-06-12
Payer: COMMERCIAL

## 2024-06-12 VITALS
RESPIRATION RATE: 18 BRPM | HEIGHT: 70 IN | HEART RATE: 78 BPM | SYSTOLIC BLOOD PRESSURE: 132 MMHG | OXYGEN SATURATION: 100 % | DIASTOLIC BLOOD PRESSURE: 85 MMHG | BODY MASS INDEX: 41.95 KG/M2 | WEIGHT: 293 LBS | TEMPERATURE: 97.2 F

## 2024-06-12 DIAGNOSIS — Z13.29 SCREENING FOR THYROID DISORDER: ICD-10-CM

## 2024-06-12 DIAGNOSIS — E55.9 VITAMIN D DEFICIENCY: ICD-10-CM

## 2024-06-12 DIAGNOSIS — E78.5 HYPERLIPIDEMIA WITH TARGET LDL LESS THAN 160: ICD-10-CM

## 2024-06-12 DIAGNOSIS — B00.9 HERPES SIMPLEX VIRUS INFECTION: ICD-10-CM

## 2024-06-12 DIAGNOSIS — N89.8 VAGINAL DISCHARGE: ICD-10-CM

## 2024-06-12 DIAGNOSIS — Z00.00 ROUTINE GENERAL MEDICAL EXAMINATION AT A HEALTH CARE FACILITY: Primary | ICD-10-CM

## 2024-06-12 DIAGNOSIS — Z12.4 SCREENING FOR CERVICAL CANCER: ICD-10-CM

## 2024-06-12 DIAGNOSIS — J30.2 SEASONAL ALLERGIC RHINITIS, UNSPECIFIED TRIGGER: ICD-10-CM

## 2024-06-12 DIAGNOSIS — Z12.4 CERVICAL CANCER SCREENING: ICD-10-CM

## 2024-06-12 DIAGNOSIS — Z13.1 SCREENING FOR DIABETES MELLITUS: ICD-10-CM

## 2024-06-12 LAB
ALBUMIN SERPL BCG-MCNC: 3.9 G/DL (ref 3.5–5.2)
ALP SERPL-CCNC: 82 U/L (ref 40–150)
ALT SERPL W P-5'-P-CCNC: 7 U/L (ref 0–50)
ANION GAP SERPL CALCULATED.3IONS-SCNC: 9 MMOL/L (ref 7–15)
AST SERPL W P-5'-P-CCNC: 20 U/L (ref 0–45)
BASOPHILS # BLD AUTO: 0 10E3/UL (ref 0–0.2)
BASOPHILS NFR BLD AUTO: 0 %
BILIRUB SERPL-MCNC: 0.2 MG/DL
BUN SERPL-MCNC: 11.1 MG/DL (ref 6–20)
CALCIUM SERPL-MCNC: 9.4 MG/DL (ref 8.6–10)
CHLORIDE SERPL-SCNC: 104 MMOL/L (ref 98–107)
CHOLEST SERPL-MCNC: 202 MG/DL
CLUE CELLS: ABNORMAL
CREAT SERPL-MCNC: 0.81 MG/DL (ref 0.51–0.95)
DEPRECATED HCO3 PLAS-SCNC: 25 MMOL/L (ref 22–29)
EGFRCR SERPLBLD CKD-EPI 2021: >90 ML/MIN/1.73M2
EOSINOPHIL # BLD AUTO: 0.1 10E3/UL (ref 0–0.7)
EOSINOPHIL NFR BLD AUTO: 1 %
ERYTHROCYTE [DISTWIDTH] IN BLOOD BY AUTOMATED COUNT: 15.5 % (ref 10–15)
FASTING STATUS PATIENT QL REPORTED: NO
FASTING STATUS PATIENT QL REPORTED: NO
GLUCOSE SERPL-MCNC: 92 MG/DL (ref 70–99)
HBA1C MFR BLD: 5.6 % (ref 0–5.6)
HCT VFR BLD AUTO: 36.2 % (ref 35–47)
HDLC SERPL-MCNC: 65 MG/DL
HGB BLD-MCNC: 11.8 G/DL (ref 11.7–15.7)
IMM GRANULOCYTES # BLD: 0 10E3/UL
IMM GRANULOCYTES NFR BLD: 0 %
LDLC SERPL CALC-MCNC: 123 MG/DL
LYMPHOCYTES # BLD AUTO: 1.4 10E3/UL (ref 0.8–5.3)
LYMPHOCYTES NFR BLD AUTO: 28 %
MCH RBC QN AUTO: 22.8 PG (ref 26.5–33)
MCHC RBC AUTO-ENTMCNC: 32.6 G/DL (ref 31.5–36.5)
MCV RBC AUTO: 70 FL (ref 78–100)
MONOCYTES # BLD AUTO: 0.5 10E3/UL (ref 0–1.3)
MONOCYTES NFR BLD AUTO: 10 %
NEUTROPHILS # BLD AUTO: 3 10E3/UL (ref 1.6–8.3)
NEUTROPHILS NFR BLD AUTO: 61 %
NONHDLC SERPL-MCNC: 137 MG/DL
PLATELET # BLD AUTO: 298 10E3/UL (ref 150–450)
POTASSIUM SERPL-SCNC: 4.4 MMOL/L (ref 3.4–5.3)
PROT SERPL-MCNC: 7.1 G/DL (ref 6.4–8.3)
RBC # BLD AUTO: 5.18 10E6/UL (ref 3.8–5.2)
SODIUM SERPL-SCNC: 138 MMOL/L (ref 135–145)
TRICHOMONAS, WET PREP: ABNORMAL
TRIGL SERPL-MCNC: 68 MG/DL
TSH SERPL DL<=0.005 MIU/L-ACNC: 2.73 UIU/ML (ref 0.3–4.2)
VIT D+METAB SERPL-MCNC: 54 NG/ML (ref 20–50)
WBC # BLD AUTO: 5 10E3/UL (ref 4–11)
WBC'S/HIGH POWER FIELD, WET PREP: ABNORMAL
YEAST, WET PREP: ABNORMAL

## 2024-06-12 PROCEDURE — 36415 COLL VENOUS BLD VENIPUNCTURE: CPT

## 2024-06-12 PROCEDURE — 87210 SMEAR WET MOUNT SALINE/INK: CPT

## 2024-06-12 PROCEDURE — 84443 ASSAY THYROID STIM HORMONE: CPT

## 2024-06-12 PROCEDURE — 80053 COMPREHEN METABOLIC PANEL: CPT

## 2024-06-12 PROCEDURE — G0145 SCR C/V CYTO,THINLAYER,RESCR: HCPCS

## 2024-06-12 PROCEDURE — 99395 PREV VISIT EST AGE 18-39: CPT

## 2024-06-12 PROCEDURE — 80061 LIPID PANEL: CPT

## 2024-06-12 PROCEDURE — 82306 VITAMIN D 25 HYDROXY: CPT

## 2024-06-12 PROCEDURE — 85025 COMPLETE CBC W/AUTO DIFF WBC: CPT

## 2024-06-12 PROCEDURE — 99213 OFFICE O/P EST LOW 20 MIN: CPT | Mod: 25

## 2024-06-12 PROCEDURE — 83036 HEMOGLOBIN GLYCOSYLATED A1C: CPT

## 2024-06-12 PROCEDURE — 87624 HPV HI-RISK TYP POOLED RSLT: CPT

## 2024-06-12 RX ORDER — ACYCLOVIR 800 MG/1
800 TABLET ORAL 3 TIMES DAILY
Qty: 6 TABLET | Refills: 2 | Status: SHIPPED | OUTPATIENT
Start: 2024-06-12

## 2024-06-12 RX ORDER — LORATADINE 10 MG/1
10 TABLET ORAL DAILY
Qty: 90 TABLET | Refills: 3 | Status: SHIPPED | OUTPATIENT
Start: 2024-06-12

## 2024-06-12 SDOH — HEALTH STABILITY: PHYSICAL HEALTH: ON AVERAGE, HOW MANY MINUTES DO YOU ENGAGE IN EXERCISE AT THIS LEVEL?: PATIENT DECLINED

## 2024-06-12 SDOH — HEALTH STABILITY: PHYSICAL HEALTH: ON AVERAGE, HOW MANY DAYS PER WEEK DO YOU ENGAGE IN MODERATE TO STRENUOUS EXERCISE (LIKE A BRISK WALK)?: 2 DAYS

## 2024-06-12 ASSESSMENT — SOCIAL DETERMINANTS OF HEALTH (SDOH): HOW OFTEN DO YOU GET TOGETHER WITH FRIENDS OR RELATIVES?: PATIENT DECLINED

## 2024-06-12 NOTE — PROGRESS NOTES
Preventive Care Visit  [facility] SOURAV Hdz CNP, Internal Medicine  Jun 12, 2024      Assessment & Plan     (Z00.00) Routine general medical examination at a health care facility  (primary encounter diagnosis)  Comment: Patient seen in clinic today for preventative health exam. Discussed labs and screenings important to today's visit. Did discuss briefly weight loss, discussed that if patient is looking for medication options would need to schedule an appointment to discuss in depth.   Plan: REVIEW OF HEALTH MAINTENANCE PROTOCOL ORDERS,         CBC with platelets and differential,         Comprehensive metabolic panel (BMP + Alb, Alk         Phos, ALT, AST, Total. Bili, TP)        Pending     (E78.5) Hyperlipidemia with target LDL less than 160  Comment: Discussed lipid labs this visit.   Plan: Lipid panel reflex to direct LDL Non-fasting        Pending     (B00.9) Herpes simplex virus infection  Comment: Patient has occasional outbreak of herpes discussed prescribing acyclovir to manage symptoms during those out breaks.   Plan: acyclovir (ZOVIRAX) 800 MG tablet        Prescription sent to pharmacy    (Z12.4) Screening for cervical cancer  Comment: Discussed need to screen this visit   Plan: Pap Screen with HPV - Recommended Age 30 - 65         Years        Pending    (Z13.29) Screening for thyroid disorder  Comment: Discussed recommendation to screen this visit.   Plan: TSH with free T4 reflex        Pending     (Z13.1) Screening for diabetes mellitus  Comment: Discussed recommendation to screen this visit.   Plan: Hemoglobin A1c        Pending     (J30.2) Seasonal allergic rhinitis, unspecified trigger  Comment: Chronic, stable. Discussed medication refill.   Plan: loratadine (CLARITIN) 10 MG tablet        Prescription sent to pharmacy    (E55.9) Vitamin D deficiency  Comment: Patient has history of vitamin D deficiency discussed checking this visit   Plan: Vitamin D  "Deficiency        Pending     (N89.8) Vaginal discharge  Comment: Discussed screening today. Negative for infection  Plan: Wet prep - Clinic Collect        Negative     Patient has been advised of split billing requirements and indicates understanding: Yes        BMI  Estimated body mass index is 42.62 kg/m  as calculated from the following:    Height as of this encounter: 1.778 m (5' 10\").    Weight as of this encounter: 134.7 kg (297 lb).   Weight management plan: Patient will schedule appointment to further discuss medication options.     Counseling  Appropriate preventive services were discussed with this patient, including applicable screening as appropriate for fall prevention, nutrition, physical activity, Tobacco-use cessation, weight loss and cognition.  Checklist reviewing preventive services available has been given to the patient.  Reviewed patient's diet, addressing concerns and/or questions.   She is at risk for lack of exercise and has been provided with information to increase physical activity for the benefit of her well-being.   The patient was instructed to see the dentist every 6 months.           Godwin Callejas is a 39 year old, presenting for the following:  Physical        6/12/2024    10:05 AM   Additional Questions   Roomed by Amanda CALIXTO        Health Care Directive  Patient does not have a Health Care Directive or Living Will: Discussed advance care planning with patient; however, patient declined at this time.    HPI      No Concerns         6/12/2024   General Health   How would you rate your overall physical health? Good   Feel stress (tense, anxious, or unable to sleep) Not at all         6/12/2024   Nutrition   Three or more servings of calcium each day? Yes   Diet: Regular (no restrictions)   How many servings of fruit and vegetables per day? (!) 2-3   How many sweetened beverages each day? (!) 3         6/12/2024   Exercise   Days per week of moderate/strenous exercise 2 days "   Average minutes spent exercising at this level Patient declined   (!) EXERCISE CONCERN      6/12/2024   Social Factors   Frequency of gathering with friends or relatives Patient declined   Worry food won't last until get money to buy more No   Food not last or not have enough money for food? No   Do you have housing?  Yes   Are you worried about losing your housing? No   Lack of transportation? No   Unable to get utilities (heat,electricity)? No         6/12/2024   Dental   Dentist two times every year? (!) NO              Today's PHQ-2 Score:       5/6/2024    10:33 AM   PHQ-2 ( 1999 [employer])   Q1: Little interest or pleasure in doing things 0   Q2: Feeling down, depressed or hopeless 0   PHQ-2 Score 0         6/12/2024   Substance Use   Alcohol more than 3/day or more than 7/wk No   Do you use any other substances recreationally? No     Social History     Tobacco Use    Smoking status: Former     Current packs/day: 0.00     Average packs/day: 1 pack/day for 11.4 years (11.4 ttl pk-yrs)     Types: Cigarettes     Start date: 1/1/1998     Quit date: 5/20/2009     Years since quitting: 15.0    Smokeless tobacco: Never   Vaping Use    Vaping status: Never Used   Substance Use Topics    Alcohol use: Yes     Alcohol/week: 2.0 standard drinks of alcohol     Types: 2 Glasses of wine per week     Comment: MONTHLY    Drug use: No     Comment: Had smoked marijuna in the past, last time in 12/03          Mammogram Screening - Patient under 40 years of age: Routine Mammogram Screening not recommended.         6/12/2024   STI Screening   New sexual partner(s) since last STI/HIV test? No     History of abnormal Pap smear: No - age 30- 64 PAP with HPV every 5 years recommended        Latest Ref Rng & Units 3/2/2021     8:31 AM 3/2/2021     8:28 AM 10/1/2015     2:11 PM   PAP / HPV   PAP (Historical)   NIL     HPV 16 DNA NEG^Negative Negative   Negative    HPV 18 DNA NEG^Negative Negative   Negative    Other HR HPV NEG^Negative  "Negative   Negative            6/12/2024   Contraception/Family Planning   Questions about contraception or family planning No        Reviewed and updated as needed this visit by Provider                    Lab work is in process      Review of Systems  Constitutional, HEENT, cardiovascular, pulmonary, gi and gu systems are negative, except as otherwise noted.     Objective    Exam  There were no vitals taken for this visit.   Estimated body mass index is 41.75 kg/m  as calculated from the following:    Height as of 12/1/22: 1.778 m (5' 10\").    Weight as of 5/6/24: 132 kg (291 lb).    Physical Exam  GENERAL: alert and no distress  EYES: Eyes grossly normal to inspection, PERRL and conjunctivae and sclerae normal  HENT: ear canals and TM's normal, nose and mouth without ulcers or lesions  NECK: no adenopathy, no asymmetry, masses, or scars  RESP: lungs clear to auscultation - no rales, rhonchi or wheezes  CV: regular rate and rhythm, normal S1 S2, no S3 or S4, no murmur, click or rub, no peripheral edema  ABDOMEN: soft, nontender, no hepatosplenomegaly, no masses and bowel sounds normal  MS: no gross musculoskeletal defects noted, no edema  SKIN: no suspicious lesions or rashes  NEURO: Normal strength and tone, mentation intact and speech normal  PSYCH: mentation appears normal, affect normal/bright        Signed Electronically by: SOURAV Justin CNP  "

## 2024-06-12 NOTE — PATIENT INSTRUCTIONS
Safer Sex: Care Instructions  Overview  Safer sex is a way to reduce your risk of getting a sexually transmitted infection (STI). It can also help prevent pregnancy.  Several products can help you practice safer sex and reduce your chance of STIs. One of the best is a condom. There are internal and external condoms. You can use a special rubber sheet (dental dam) for protection during oral sex. Disposable gloves can keep your hands from touching blood, semen, or other body fluids that can carry infections.  Remember that birth control methods such as diaphragms, IUDs, foams, and birth control pills do not stop you from getting STIs.  Follow-up care is a key part of your treatment and safety. Be sure to make and go to all appointments, and call your doctor if you are having problems. It's also a good idea to know your test results and keep a list of the medicines you take.  How can you care for yourself at home?  Think about getting vaccinated to help prevent hepatitis A, hepatitis B, and human papillomavirus (HPV). They can be spread through sex.  Use a condom every time you have sex. Use an external condom, which goes on the penis. Or use an internal condom, which goes into the vagina or anus.  Make sure you use the right size external condom. A condom that's too small can break easily. A condom that's too big can slip off during sex.  Use a new condom each time you have sex. Be careful not to poke a hole in the condom when you open the wrapper.  Don't use an internal condom and an external condom at the same time.  Never use petroleum jelly (such as Vaseline), grease, hand lotion, baby oil, or anything with oil in it. These products can make holes in the condom.  After intercourse, hold the edge of the condom as you remove it. This will help keep semen from spilling out of the condom.  Do not have sex with anyone who has symptoms of an STI, such as sores on the genitals or mouth.  Do not drink a lot of alcohol or  "use drugs before sex.  Limit your sex partners. Sex with one partner who has sex only with you can reduce your risk of getting an STI.  Don't share sex toys. But if you do share them, use a condom and clean the sex toys between each use.  Talk to any partners before you have sex. Talk about what you feel comfortable with and whether you have any boundaries with sex. And find out if your partner or partners may be at risk for any STI. Keep in mind that a person may be able to spread an STI even if they do not have symptoms. You and any partners may want to get tested for STIs.  Where can you learn more?  Go to [email or web address]/patiented  Enter B608 in the search box to learn more about \"Safer Sex: Care Instructions.\"  Current as of: November 27, 2023               Content Version: 14.0    8521-2461 [employer].   Care instructions adapted under license by your healthcare professional. If you have questions about a medical condition or this instruction, always ask your healthcare professional. [employer] disclaims any warranty or liability for your use of this information.      Exposure to Sexually Transmitted Infections: Care Instructions  Overview  Sexually transmitted infections (STIs) are infections spread by sexual contact. This includes genital skin-to-skin contact and vaginal, oral, and anal sex. If you're pregnant, you can also spread them to your baby before or during the birth.  STIs are common. But they don't always cause symptoms. And if they are not treated, they can lead to health problems. Testing and treatment are important to help protect the health of you and your partner or partners.  STIs caused by bacteria can go away with treatment. STIs caused by viruses can be treated to relieve symptoms, but treatment won't make them go away.  Follow-up care is a key part of your treatment and safety. Be sure to make and go to all appointments, and call your doctor if you " are having problems. It's also a good idea to know your test results and keep a list of the medicines you take.  How can you care for yourself at home?  Take medicines exactly as prescribed.  If your doctor prescribed antibiotics, take them as directed. Don't stop taking them just because you feel better. You need to take the full course of antibiotics.  Tell your sex partner or partners that they will need treatment. For certain STIs, your doctor may be able to prescribe treatment for any partners also.  Don't have sexual contact while you have symptoms of an STI or are being treated for an STI.  Don't douche. Douching changes the normal balance of bacteria in your vagina. It may increase the risk of spreading the infection to your uterus or other reproductive organs.  How can you prevent sexually transmitted infections (STIs)?  You can help prevent STIs if you wait to have sex with a new partner (or partners) until you've each been tested for STIs. It also helps if you use condoms and if you limit your sex partners to one person who has sex only with you.  When should you call for help?   Call 911 anytime you think you may need emergency care. For example, call if:    You have sudden, severe pain in your belly or pelvis.   Call your doctor now or seek immediate medical care if:    You have new belly or pelvic pain.     You have a fever.     You have new or increased burning or pain with urination, or you cannot urinate.     You have pain, swelling, or tenderness in the scrotum.     You are pregnant and have any symptoms of an STI.   Watch closely for changes in your health, and be sure to contact your doctor if:    You have unusual vaginal bleeding.     You have a discharge from the vagina, penis, or anus.     You have any new symptoms, such as sores, bumps, rashes, blisters, or warts in the genital or anal area.     You have itching, tingling, pain, or burning in the genital or anal area.     You think you may  "have been exposed to an STI.     You have a sore throat or sores in your mouth or on your tongue.   Where can you learn more?  Go to https://www.[employer].net/patiented  Enter M049 in the search box to learn more about \"Exposure to Sexually Transmitted Infections: Care Instructions.\"  Current as of: November 27, 2023               Content Version: 14.0    6813-9125 [employer].   Care instructions adapted under license by your healthcare professional. If you have questions about a medical condition or this instruction, always ask your healthcare professional. [employer] disclaims any warranty or liability for your use of this information.      Preventive Care Advice   This is general advice we often give to help people stay healthy. Your care team may have specific advice just for you. Please talk to your care team about your own preventive care needs.  Lifestyle  Exercise at least 150 minutes each week (30 minutes a day, 5 days a week).  Do muscle strengthening activities 2 days a week. These help control your weight and prevent disease.  No smoking.  Wear sunscreen to prevent skin cancer.  Have your home tested for radon every 2 to 5 years. Radon is a colorless, odorless gas that can harm your lungs. To learn more, go to www.health.state.[place]. and search for \"Radon in Homes.\"  Keep guns unloaded and locked up in a safe place like a safe or gun vault, or, use a gun lock and hide the keys. Always lock away bullets separately. To learn more, visit [employer].[place].gov and search for \"safe gun storage.\"  Nutrition  Eat 5 or more servings of fruits and vegetables each day.  Try wheat bread, brown rice and whole grain pasta (instead of white bread, rice, and pasta).  Get enough calcium and vitamin D. Check the label on foods and aim for 100% of the RDA (recommended daily allowance).  Regular exams  Have a dental exam and cleaning every 6 months.  See your health care team every year to talk about:  Any " changes in your health.  Any medicines your care team has prescribed.  Preventive care, family planning, and ways to prevent chronic diseases.  Shots (vaccines)   HPV shots (up to age 26), if you've never had them before.  Hepatitis B shots (up to age 59), if you've never had them before.  COVID-19 shot: Get this shot when it's due.  Flu shot: Get a flu shot every year.  Tetanus shot: Get a tetanus shot every 10 years.  Pneumococcal, hepatitis A, and RSV shots: Ask your care team if you need these based on your risk.  Shingles shot (for age 50 and up).  General health tests  Diabetes screening:  Starting at age 35, Get screened for diabetes at least every 3 years.  If you are younger than age 35, ask your care team if you should be screened for diabetes.  Cholesterol test: At age 39, start having a cholesterol test every 5 years, or more often if advised.  Bone density scan (DEXA): At age 50, ask your care team if you should have this scan for osteoporosis (brittle bones).  Hepatitis C: Get tested at least once in your life.  Abdominal aortic aneurysm screening: Talk to your doctor about having this screening if you:  Have ever smoked; and  Are biologically male; and  Are between the ages of 65 and 75.  STIs (sexually transmitted infections)  Before age 24: Ask your care team if you should be screened for STIs.  After age 24: Get screened for STIs if you're at risk. You are at risk for STIs (including HIV) if:  You are sexually active with more than one person.  You don't use condoms every time.  You or a partner was diagnosed with a sexually transmitted infection.  If you are at risk for HIV, ask about PrEP medicine to prevent HIV.  Get tested for HIV at least once in your life, whether you are at risk for HIV or not.  Cancer screening tests  Cervical cancer screening: If you have a cervix, begin getting regular cervical cancer screening tests at age 21. Most people who have regular screenings with normal results  "can stop after age 65. Talk about this with your provider.  Breast cancer scan (mammogram): If you've ever had breasts, begin having regular mammograms starting at age 40. This is a scan to check for breast cancer.  Colon cancer screening: It is important to start screening for colon cancer at age 45.  Have a colonoscopy test every 10 years (or more often if you're at risk) Or, ask your provider about stool tests like a FIT test every year or Cologuard test every 3 years.  To learn more about your testing options, visit: www.[employer]/441093.pdf.  For help making a decision, visit: bonnie/at59853.  Prostate cancer screening test: If you have a prostate and are age 55 to 69, ask your provider if you would benefit from a yearly prostate cancer screening test.  Lung cancer screening: If you are a current or former smoker age 50 to 80, ask your care team if ongoing lung cancer screenings are right for you.  For informational purposes only. Not to replace the advice of your health care provider. Copyright   2023 [facility] [employer]. All rights reserved. Clinically reviewed by the [facility] Transitions Program. [employer] 900953 - REV 04/24.  Preventive Care Advice   This is general advice we often give to help people stay healthy. Your care team may have specific advice just for you. Please talk to your care team about your own preventive care needs.  Lifestyle  Exercise at least 150 minutes each week (30 minutes a day, 5 days a week).  Do muscle strengthening activities 2 days a week. These help control your weight and prevent disease.  No smoking.  Wear sunscreen to prevent skin cancer.  Have your home tested for radon every 2 to 5 years. Radon is a colorless, odorless gas that can harm your lungs. To learn more, go to www.health.state.[place] and search for \"Radon in Homes.\"  Keep guns unloaded and locked up in a safe place like a safe or gun vault, or, use a gun lock and hide the keys. Always lock away " "bullets separately. To learn more, visit dps.[place].gov and search for \"safe gun storage.\"  Nutrition  Eat 5 or more servings of fruits and vegetables each day.  Try wheat bread, brown rice and whole grain pasta (instead of white bread, rice, and pasta).  Get enough calcium and vitamin D. Check the label on foods and aim for 100% of the RDA (recommended daily allowance).  Regular exams  Have a dental exam and cleaning every 6 months.  See your health care team every year to talk about:  Any changes in your health.  Any medicines your care team has prescribed.  Preventive care, family planning, and ways to prevent chronic diseases.  Shots (vaccines)   HPV shots (up to age 26), if you've never had them before.  Hepatitis B shots (up to age 59), if you've never had them before.  COVID-19 shot: Get this shot when it's due.  Flu shot: Get a flu shot every year.  Tetanus shot: Get a tetanus shot every 10 years.  Pneumococcal, hepatitis A, and RSV shots: Ask your care team if you need these based on your risk.  Shingles shot (for age 50 and up).  General health tests  Diabetes screening:  Starting at age 35, Get screened for diabetes at least every 3 years.  If you are younger than age 35, ask your care team if you should be screened for diabetes.  Cholesterol test: At age 39, start having a cholesterol test every 5 years, or more often if advised.  Bone density scan (DEXA): At age 50, ask your care team if you should have this scan for osteoporosis (brittle bones).  Hepatitis C: Get tested at least once in your life.  Abdominal aortic aneurysm screening: Talk to your doctor about having this screening if you:  Have ever smoked; and  Are biologically male; and  Are between the ages of 65 and 75.  STIs (sexually transmitted infections)  Before age 24: Ask your care team if you should be screened for STIs.  After age 24: Get screened for STIs if you're at risk. You are at risk for STIs (including HIV) if:  You are sexually " active with more than one person.  You don't use condoms every time.  You or a partner was diagnosed with a sexually transmitted infection.  If you are at risk for HIV, ask about PrEP medicine to prevent HIV.  Get tested for HIV at least once in your life, whether you are at risk for HIV or not.  Cancer screening tests  Cervical cancer screening: If you have a cervix, begin getting regular cervical cancer screening tests at age 21. Most people who have regular screenings with normal results can stop after age 65. Talk about this with your provider.  Breast cancer scan (mammogram): If you've ever had breasts, begin having regular mammograms starting at age 40. This is a scan to check for breast cancer.  Colon cancer screening: It is important to start screening for colon cancer at age 45.  Have a colonoscopy test every 10 years (or more often if you're at risk) Or, ask your provider about stool tests like a FIT test every year or Cologuard test every 3 years.  To learn more about your testing options, visit: www.awe.sm/620578.pdf.  For help making a decision, visit: bonnie/ly63761.  Prostate cancer screening test: If you have a prostate and are age 55 to 69, ask your provider if you would benefit from a yearly prostate cancer screening test.  Lung cancer screening: If you are a current or former smoker age 50 to 80, ask your care team if ongoing lung cancer screenings are right for you.  For informational purposes only. Not to replace the advice of your health care provider. Copyright   2023 [facility] [employer]. All rights reserved. Clinically reviewed by the [facility] Transitions Program. [employer]YL[employer] [id number] - REV 04/24.

## 2024-06-13 ENCOUNTER — TELEPHONE (OUTPATIENT)
Dept: INTERNAL MEDICINE | Facility: CLINIC | Age: 39
End: 2024-06-13
Payer: COMMERCIAL

## 2024-06-13 DIAGNOSIS — D64.9 ANEMIA, UNSPECIFIED TYPE: Primary | ICD-10-CM

## 2024-06-13 LAB
HPV HR 12 DNA CVX QL NAA+PROBE: NEGATIVE
HPV16 DNA CVX QL NAA+PROBE: NEGATIVE
HPV18 DNA CVX QL NAA+PROBE: NEGATIVE
HUMAN PAPILLOMA VIRUS FINAL DIAGNOSIS: NORMAL

## 2024-06-13 RX ORDER — FERROUS SULFATE 325(65) MG
325 TABLET, DELAYED RELEASE (ENTERIC COATED) ORAL DAILY
Qty: 30 TABLET | Refills: 0 | Status: SHIPPED | OUTPATIENT
Start: 2024-06-13

## 2024-06-13 NOTE — TELEPHONE ENCOUNTER
Patient calling with some questions on lab results and wanting PCP's interpretation. RN relayed results are automatically released to patients and to give providers 1-2 days for result note. Patient verbalized understanding.     Patient concerns with lipid labs and states she was not fasting. Patient also noted a couple other abnormal flagged labs. Please review and advise on labs completed 6/12/24 when able.         Mayuri Shanks RN on 6/13/2024 at 11:23 AM

## 2024-06-13 NOTE — TELEPHONE ENCOUNTER
Patient was called and results were discussed with patient. Patient acknowledged understanding. Will repeat CBC in 3 months can do as lab only started patient on iron.

## 2024-06-18 LAB
BKR LAB AP GYN ADEQUACY: NORMAL
BKR LAB AP GYN INTERPRETATION: NORMAL
BKR LAB AP LMP: NORMAL
BKR LAB AP PREVIOUS ABNORMAL: NORMAL
PATH REPORT.COMMENTS IMP SPEC: NORMAL
PATH REPORT.COMMENTS IMP SPEC: NORMAL
PATH REPORT.RELEVANT HX SPEC: NORMAL

## 2024-06-19 ENCOUNTER — VIRTUAL VISIT (OUTPATIENT)
Dept: INTERNAL MEDICINE | Facility: CLINIC | Age: 39
End: 2024-06-19
Payer: COMMERCIAL

## 2024-06-19 DIAGNOSIS — I10 HYPERTENSION GOAL BP (BLOOD PRESSURE) < 140/90: ICD-10-CM

## 2024-06-19 DIAGNOSIS — B00.1 COLD SORE: Primary | ICD-10-CM

## 2024-06-19 DIAGNOSIS — E78.5 HYPERLIPIDEMIA WITH TARGET LDL LESS THAN 160: ICD-10-CM

## 2024-06-19 PROCEDURE — 99214 OFFICE O/P EST MOD 30 MIN: CPT | Mod: 95

## 2024-06-19 RX ORDER — ACYCLOVIR 400 MG/1
400 TABLET ORAL DAILY
Qty: 3 TABLET | Refills: 0 | Status: SHIPPED | OUTPATIENT
Start: 2024-06-19 | End: 2024-06-24

## 2024-06-19 ASSESSMENT — ENCOUNTER SYMPTOMS: WHEEZING: 1

## 2024-06-19 NOTE — PROGRESS NOTES
Britta is a 39 year old who is being evaluated via a billable video visit.    How would you like to obtain your AVS? MyChart  If the video visit is dropped, the invitation should be resent by: Send to e-mail at: [email or web address]  Will anyone else be joining your video visit? No      Assessment & Plan     (B00.1) Cold sore  (primary encounter diagnosis)  Comment: Patient has what appears to be a cold sore on bottom lip. Discussed treating with acyclovir and over the counter Abreva.   Plan: acyclovir (ZOVIRAX) 400 MG tablet        Prescription sent to pharmacy     (E78.5) Hyperlipidemia with target LDL less than 160  Comment: Chronic, stable.   Plan: Continue current plan of care    (I10) Hypertension goal BP (blood pressure) < 140/90  Comment: Chronic, stable.   Plan: Continue current plan of care         Subjective   Britta is a 39 year old, presenting for the following health issues:  Allergies (After eating sea food couple days ago)      Video Start Time:  3:00    Allergies    History of Present Illness       Reason for visit:  Hive break out on lip from eating seafood  Symptom onset:  1-3 days ago  Symptoms include:  Itchy hive on lips  Symptom intensity:  Mild  Symptom progression:  Staying the same  Had these symptoms before:  No  What makes it worse:  No  What makes it better:  No    She eats 2-3 servings of fruits and vegetables daily.She consumes 3 sweetened beverage(s) daily.She exercises with enough effort to increase her heart rate 9 or less minutes per day.  She exercises with enough effort to increase her heart rate 3 or less days per week.   She is taking medications regularly.               Review of Systems  Constitutional, HEENT, cardiovascular, pulmonary, gi and gu systems are negative, except as otherwise noted.      Objective           Vitals:  No vitals were obtained today due to virtual visit.    Physical Exam   GENERAL: alert and no distress  EYES: Eyes grossly normal to inspection.  No  discharge or erythema, or obvious scleral/conjunctival abnormalities.  RESP: No audible wheeze, cough, or visible cyanosis.    SKIN: Visible skin clear. No significant rash, abnormal pigmentation or lesions.  NEURO: Cranial nerves grossly intact.  Mentation and speech appropriate for age.  PSYCH: Appropriate affect, tone, and pace of words          Video-Visit Details    Type of service:  Video Visit   Video End Time: 3:30  Originating Location (pt. Location): Home  Distant Location (provider location):  On-site  Platform used for Video Visit: Yola  Signed Electronically by: SOURAV Justin CNP

## 2024-08-26 ENCOUNTER — INFUSION THERAPY VISIT (OUTPATIENT)
Dept: INFUSION THERAPY | Facility: CLINIC | Age: 39
End: 2024-08-26
Attending: PSYCHIATRY & NEUROLOGY
Payer: COMMERCIAL

## 2024-08-26 ENCOUNTER — LAB (OUTPATIENT)
Dept: INFUSION THERAPY | Facility: CLINIC | Age: 39
End: 2024-08-26
Payer: COMMERCIAL

## 2024-08-26 VITALS
OXYGEN SATURATION: 98 % | TEMPERATURE: 98 F | DIASTOLIC BLOOD PRESSURE: 85 MMHG | HEART RATE: 76 BPM | RESPIRATION RATE: 16 BRPM | BODY MASS INDEX: 42.2 KG/M2 | WEIGHT: 293 LBS | SYSTOLIC BLOOD PRESSURE: 137 MMHG

## 2024-08-26 DIAGNOSIS — G35 MS (MULTIPLE SCLEROSIS) (H): Primary | ICD-10-CM

## 2024-08-26 DIAGNOSIS — E55.9 VITAMIN D DEFICIENCY: ICD-10-CM

## 2024-08-26 DIAGNOSIS — G35 MS (MULTIPLE SCLEROSIS) (H): ICD-10-CM

## 2024-08-26 LAB
ALBUMIN SERPL BCG-MCNC: 4 G/DL (ref 3.5–5.2)
ALP SERPL-CCNC: 82 U/L (ref 40–150)
ALT SERPL W P-5'-P-CCNC: <5 U/L (ref 0–50)
AST SERPL W P-5'-P-CCNC: 20 U/L (ref 0–45)
BASOPHILS # BLD AUTO: 0 10E3/UL (ref 0–0.2)
BASOPHILS NFR BLD AUTO: 1 %
BILIRUB DIRECT SERPL-MCNC: <0.2 MG/DL (ref 0–0.3)
BILIRUB SERPL-MCNC: 0.3 MG/DL
EOSINOPHIL # BLD AUTO: 0.1 10E3/UL (ref 0–0.7)
EOSINOPHIL NFR BLD AUTO: 2 %
ERYTHROCYTE [DISTWIDTH] IN BLOOD BY AUTOMATED COUNT: 15.5 % (ref 10–15)
HCT VFR BLD AUTO: 36.2 % (ref 35–47)
HGB BLD-MCNC: 11.7 G/DL (ref 11.7–15.7)
IMM GRANULOCYTES # BLD: 0 10E3/UL
IMM GRANULOCYTES NFR BLD: 0 %
LYMPHOCYTES # BLD AUTO: 1.8 10E3/UL (ref 0.8–5.3)
LYMPHOCYTES NFR BLD AUTO: 36 %
MCH RBC QN AUTO: 22.4 PG (ref 26.5–33)
MCHC RBC AUTO-ENTMCNC: 32.3 G/DL (ref 31.5–36.5)
MCV RBC AUTO: 69 FL (ref 78–100)
MONOCYTES # BLD AUTO: 0.4 10E3/UL (ref 0–1.3)
MONOCYTES NFR BLD AUTO: 8 %
NEUTROPHILS # BLD AUTO: 2.6 10E3/UL (ref 1.6–8.3)
NEUTROPHILS NFR BLD AUTO: 53 %
NRBC # BLD AUTO: 0 10E3/UL
NRBC BLD AUTO-RTO: 0 /100
PLATELET # BLD AUTO: 304 10E3/UL (ref 150–450)
PROT SERPL-MCNC: 7.3 G/DL (ref 6.4–8.3)
RBC # BLD AUTO: 5.22 10E6/UL (ref 3.8–5.2)
VIT D+METAB SERPL-MCNC: 42 NG/ML (ref 20–50)
WBC # BLD AUTO: 4.9 10E3/UL (ref 4–11)

## 2024-08-26 PROCEDURE — 250N000013 HC RX MED GY IP 250 OP 250 PS 637: Performed by: PSYCHIATRY & NEUROLOGY

## 2024-08-26 PROCEDURE — 36415 COLL VENOUS BLD VENIPUNCTURE: CPT

## 2024-08-26 PROCEDURE — 82306 VITAMIN D 25 HYDROXY: CPT

## 2024-08-26 PROCEDURE — 82247 BILIRUBIN TOTAL: CPT

## 2024-08-26 PROCEDURE — 250N000011 HC RX IP 250 OP 636: Performed by: PSYCHIATRY & NEUROLOGY

## 2024-08-26 PROCEDURE — 96365 THER/PROPH/DIAG IV INF INIT: CPT

## 2024-08-26 PROCEDURE — 258N000003 HC RX IP 258 OP 636: Performed by: PSYCHIATRY & NEUROLOGY

## 2024-08-26 PROCEDURE — 82784 ASSAY IGA/IGD/IGG/IGM EACH: CPT

## 2024-08-26 PROCEDURE — 99207 PR NO CHARGE LOS: CPT

## 2024-08-26 PROCEDURE — 96375 TX/PRO/DX INJ NEW DRUG ADDON: CPT

## 2024-08-26 PROCEDURE — 96366 THER/PROPH/DIAG IV INF ADDON: CPT

## 2024-08-26 PROCEDURE — 85025 COMPLETE CBC W/AUTO DIFF WBC: CPT

## 2024-08-26 RX ORDER — DIPHENHYDRAMINE HCL 25 MG
50 CAPSULE ORAL ONCE
OUTPATIENT
Start: 2025-02-21

## 2024-08-26 RX ORDER — METHYLPREDNISOLONE SODIUM SUCCINATE 125 MG/2ML
125 INJECTION, POWDER, LYOPHILIZED, FOR SOLUTION INTRAMUSCULAR; INTRAVENOUS
Start: 2025-02-21

## 2024-08-26 RX ORDER — METHYLPREDNISOLONE SODIUM SUCCINATE 125 MG/2ML
125 INJECTION, POWDER, LYOPHILIZED, FOR SOLUTION INTRAMUSCULAR; INTRAVENOUS ONCE
Status: COMPLETED | OUTPATIENT
Start: 2024-08-26 | End: 2024-08-26

## 2024-08-26 RX ORDER — HEPARIN SODIUM (PORCINE) LOCK FLUSH IV SOLN 100 UNIT/ML 100 UNIT/ML
5 SOLUTION INTRAVENOUS
OUTPATIENT
Start: 2025-02-21

## 2024-08-26 RX ORDER — ALBUTEROL SULFATE 0.83 MG/ML
2.5 SOLUTION RESPIRATORY (INHALATION)
OUTPATIENT
Start: 2025-02-21

## 2024-08-26 RX ORDER — MEPERIDINE HYDROCHLORIDE 25 MG/ML
25 INJECTION INTRAMUSCULAR; INTRAVENOUS; SUBCUTANEOUS EVERY 30 MIN PRN
OUTPATIENT
Start: 2025-02-21

## 2024-08-26 RX ORDER — ALBUTEROL SULFATE 90 UG/1
1-2 AEROSOL, METERED RESPIRATORY (INHALATION)
Start: 2025-02-21

## 2024-08-26 RX ORDER — HEPARIN SODIUM,PORCINE 10 UNIT/ML
5-20 VIAL (ML) INTRAVENOUS DAILY PRN
OUTPATIENT
Start: 2025-02-21

## 2024-08-26 RX ORDER — DIPHENHYDRAMINE HCL 25 MG
50 CAPSULE ORAL ONCE
Status: COMPLETED | OUTPATIENT
Start: 2024-08-26 | End: 2024-08-26

## 2024-08-26 RX ORDER — DIPHENHYDRAMINE HYDROCHLORIDE 50 MG/ML
50 INJECTION INTRAMUSCULAR; INTRAVENOUS
Start: 2025-02-21

## 2024-08-26 RX ORDER — EPINEPHRINE 1 MG/ML
0.3 INJECTION, SOLUTION INTRAMUSCULAR; SUBCUTANEOUS EVERY 5 MIN PRN
OUTPATIENT
Start: 2025-02-21

## 2024-08-26 RX ORDER — ACETAMINOPHEN 325 MG/1
650 TABLET ORAL ONCE
OUTPATIENT
Start: 2025-02-21

## 2024-08-26 RX ORDER — METHYLPREDNISOLONE SODIUM SUCCINATE 125 MG/2ML
125 INJECTION, POWDER, LYOPHILIZED, FOR SOLUTION INTRAMUSCULAR; INTRAVENOUS ONCE
OUTPATIENT
Start: 2025-02-21

## 2024-08-26 RX ORDER — ACETAMINOPHEN 325 MG/1
650 TABLET ORAL ONCE
Status: COMPLETED | OUTPATIENT
Start: 2024-08-26 | End: 2024-08-26

## 2024-08-26 RX ADMIN — ACETAMINOPHEN 650 MG: 325 TABLET ORAL at 08:42

## 2024-08-26 RX ADMIN — DIPHENHYDRAMINE HYDROCHLORIDE 50 MG: 25 CAPSULE ORAL at 08:42

## 2024-08-26 RX ADMIN — METHYLPREDNISOLONE SODIUM SUCCINATE 125 MG: 125 INJECTION, POWDER, FOR SOLUTION INTRAMUSCULAR; INTRAVENOUS at 10:13

## 2024-08-26 RX ADMIN — SODIUM CHLORIDE 250 ML: 9 INJECTION, SOLUTION INTRAVENOUS at 10:12

## 2024-08-26 RX ADMIN — OCRELIZUMAB 600 MG: 300 INJECTION INTRAVENOUS at 10:20

## 2024-08-26 ASSESSMENT — PAIN SCALES - GENERAL: PAINLEVEL: NO PAIN (0)

## 2024-08-26 NOTE — PROGRESS NOTES
Infusion Nursing Note:  Stefania Ashton presents today for Non-Rapid Ocrevus.    Patient seen by provider today: No   present during visit today: Not Applicable.    Note: The patient reports feeling well today and denies any concerns at this time.     Ocrevus started at a rate at 40 mL/hr and increased by 40 mL/hr every 30 minutes to a max of 200 mL/hr.     Intravenous Access:  Peripheral IV placed on the 8th attempt by 4 different nurses.    Treatment Conditions:  Biological Infusion Checklist:  ~~~ NOTE: If the patient answers yes to any of the questions below, hold the infusion and contact ordering provider or on-call provider.    Have you recently had an elevated temperature, fever, chills, productive cough, coughing for 3 weeks or longer or hemoptysis,  abnormal vital signs, night sweats,  chest pain or have you noticed a decrease in your appetite, unexplained weight loss or fatigue? No  Do you have any open wounds or new incisions? No  Do you have any upcoming hospitalizations or surgeries? Does not include esophagogastroduodenoscopy, colonoscopy, endoscopic retrograde cholangiopancreatography (ERCP), endoscopic ultrasound (EUS), dental procedures or joint aspiration/steroid injections No  Do you currently have any signs of illness or infection or are you on any antibiotics? No  Have you had any new, sudden or worsening abdominal pain? No  Have you or anyone in your household received a live vaccination in the past 4 weeks? Please note: No live vaccines while on biologic/chemotherapy until 6 months after the last treatment. Patient can receive the flu vaccine (shot only), pneumovax and the Covid vaccine. It is optimal for the patient to get these vaccines mid cycle, but they can be given at any time as long as it is not on the day of the infusion. No  Have you recently been diagnosed with any new nervous system diseases (ie. Multiple sclerosis, Guillain Aiken, seizures, neurological changes) or  cancer diagnosis? Are you on any form of radiation or chemotherapy? No  Are you pregnant or breast feeding or do you have plans of pregnancy in the future? No  Have you been having any signs of worsening depression or suicidal ideations?  (benlysta only) N/A  Have there been any other new onset medical symptoms? No  Have you had any new blood clots? (IVIG only) N/A      Post Infusion Assessment:  Patient tolerated infusion without incident.  Patient observed for 1 hour post Ocrevus per protocol.  Blood return noted pre and post infusion.  Site patent and intact, free from redness, edema or discomfort.  No evidence of extravasations.  Access discontinued per protocol.  Biologic Infusion Post Education: Call the triage nurse at your clinic or seek medical attention if you have chills and/or temperature greater than or equal to 100.5, uncontrolled nausea/vomiting, diarrhea, constipation, dizziness, shortness of breath, chest pain, heart palpitations, weakness or any other new or concerning symptoms, questions or concerns.  You cannot have any live virus vaccines prior to or during treatment or up to 6 months post infusion.  If you have an upcoming surgery, medical procedure or dental procedure during treatment, this should be discussed with your ordering physician and your surgeon/dentist.  If you are having any concerning symptom, if you are unsure if you should get your next infusion or wish to speak to a provider before your next infusion, please call your care coordinator or triage nurse at your clinic to notify them so we can adequately serve you.       Discharge Plan:   AVS to patient via [employer].  Patient will return 2/26/24 for next appointment. Future appts have been reviewed and crosschecked with appt note and plan.   Patient discharged in stable condition accompanied by: self.  Departure Mode: Ambulatory.      Yarely Harrington RN

## 2024-08-27 DIAGNOSIS — G35 MS (MULTIPLE SCLEROSIS) (H): Primary | ICD-10-CM

## 2024-08-27 LAB — IGG SERPL-MCNC: 1199 MG/DL (ref 610–1616)

## 2024-11-07 ENCOUNTER — ANCILLARY PROCEDURE (OUTPATIENT)
Dept: MRI IMAGING | Facility: CLINIC | Age: 39
End: 2024-11-07
Attending: PSYCHIATRY & NEUROLOGY
Payer: COMMERCIAL

## 2024-11-07 DIAGNOSIS — G35 MS (MULTIPLE SCLEROSIS) (H): ICD-10-CM

## 2024-11-07 PROCEDURE — 70553 MRI BRAIN STEM W/O & W/DYE: CPT | Performed by: STUDENT IN AN ORGANIZED HEALTH CARE EDUCATION/TRAINING PROGRAM

## 2024-11-07 PROCEDURE — 72156 MRI NECK SPINE W/O & W/DYE: CPT | Performed by: STUDENT IN AN ORGANIZED HEALTH CARE EDUCATION/TRAINING PROGRAM

## 2024-11-07 PROCEDURE — A9585 GADOBUTROL INJECTION: HCPCS | Performed by: STUDENT IN AN ORGANIZED HEALTH CARE EDUCATION/TRAINING PROGRAM

## 2024-11-07 RX ORDER — GADOBUTROL 604.72 MG/ML
13 INJECTION INTRAVENOUS ONCE
Status: COMPLETED | OUTPATIENT
Start: 2024-11-07 | End: 2024-11-07

## 2024-11-07 RX ADMIN — GADOBUTROL 7.5 ML: 604.72 INJECTION INTRAVENOUS at 11:59

## 2024-11-09 NOTE — PROGRESS NOTES
"Referral source: Established patient    Chief complaint: Multiple sclerosis    History of the Present Illness: Ms. Stefania Ashton is a 39 year old right-handed woman who presents to the [facility] today for a scheduled follow up visit regarding her diagnosis of multiple sclerosis.    The patient's history is as per my previous notes. Initial onset of symptoms of demyelinating disease was in 2015 when she had an episode of facial weakness. Several months later, she developed a new sixth nerve palsy, and MRI and CSF studies done at that time were consistent with multiple sclerosis. She was initially treated with glatiramer acetate, but had ongoing radiologic disease activity on that medication, leading to transition to the Plegridy formulation of interferon beta. She had further radiologic progression on that medication as well, and most recently was transitioned to ocrelizumab in 2019, with the last infusion completed on 2/27/2024.    Today, the patient continues to deny any new, episodic changes in vision, balance, strength, or sensation suggestive of new relapse of multiple sclerosis since she was last seen in this clinic. She describes her overall status as \"so far, so good\".    Symptomatically, she is not having any particularly difficulty with walking at this point and denies any recent falls.    PHYSICAL EXAMINATION:  VITAL SIGNS: Blood pressure 130/86; pulse 68; weight 132 kg.  GENERAL: Obese woman who presents to the examination alone, awake and alert and in no acute distress.    NEUROLOGIC EXAMINATION:  CRANIAL NERVES: Visual fields are full to confrontation.  Extraocular movements are intact with no internuclear ophthalmoplegia.  Facial strength is normal.  Palate elevation and tongue protrusion are normal.  POWER: Strength is within normal limits in proximal and distal muscles in the upper and lower limbs throughout.  REFLEXES: Reflexes are symmetric and within normal limits in the arms and " legs.  MOTOR/CEREBELLAR: There is no appendicular ataxia on finger-to-nose testing.  Rapid alternating movements are within normal limits in the hands and fingers.  There is no pronator drift in the arms.  GAIT: The patient is able to ambulate on a flat, level surface with no gross loss of postural stability, and able to walk on heels and toes. Tandem gait is mildly impaired for age.    Assessment/plan:    1. Multiple sclerosis  The patient remains clinically stable as regards any evidence of active inflammatory demyelination on current disease modifying therapy with ocrelizumab. She will continue this medication, with the next infusion to be performed as scheduled in August.    Prior to the infusion, I will obtain routine laboratory studies for monitoring of this medication to include complete blood counts with differential, hepatic panel, and total antibody (IgG) level.    I will see her back in 6 months for a review, with MRI scans of the brain and cervical spine to be performed prior to that appointment in order to monitor the radiologic stability of her condition.    2. Vitamin D deficiency  I will also check a vitamin D level at her next laboratory draw and advise her on supplementation with vitamin D as needed to maintain her level within our goal range of 50-80 mcg/L. Currently, she is taking 5000 units of vitamin D daily.    The longitudinal plans of care for the diagnoses as documented were addressed during this visit. Due to the added complexity in care, I will continue to support the patient in subsequent management and with ongoing continuity of care.

## 2024-11-18 ENCOUNTER — OFFICE VISIT (OUTPATIENT)
Dept: NEUROLOGY | Facility: CLINIC | Age: 39
End: 2024-11-18
Payer: COMMERCIAL

## 2024-11-18 VITALS
HEIGHT: 70 IN | DIASTOLIC BLOOD PRESSURE: 73 MMHG | SYSTOLIC BLOOD PRESSURE: 119 MMHG | BODY MASS INDEX: 41.95 KG/M2 | WEIGHT: 293 LBS | HEART RATE: 91 BPM

## 2024-11-18 DIAGNOSIS — E55.9 VITAMIN D DEFICIENCY: ICD-10-CM

## 2024-11-18 DIAGNOSIS — G35 MS (MULTIPLE SCLEROSIS) (H): Primary | ICD-10-CM

## 2024-11-18 RX ORDER — ALBUTEROL SULFATE 90 UG/1
1-2 INHALANT RESPIRATORY (INHALATION)
Start: 2025-02-01

## 2024-11-18 RX ORDER — HEPARIN SODIUM,PORCINE 10 UNIT/ML
5-20 VIAL (ML) INTRAVENOUS DAILY PRN
OUTPATIENT
Start: 2025-02-01

## 2024-11-18 RX ORDER — DIPHENHYDRAMINE HYDROCHLORIDE 50 MG/ML
50 INJECTION INTRAMUSCULAR; INTRAVENOUS
Start: 2025-02-01

## 2024-11-18 RX ORDER — MEPERIDINE HYDROCHLORIDE 25 MG/ML
25 INJECTION INTRAMUSCULAR; INTRAVENOUS; SUBCUTANEOUS
OUTPATIENT
Start: 2025-02-01

## 2024-11-18 RX ORDER — METHYLPREDNISOLONE SODIUM SUCCINATE 125 MG/2ML
125 INJECTION INTRAMUSCULAR; INTRAVENOUS ONCE
OUTPATIENT
Start: 2025-02-01

## 2024-11-18 RX ORDER — DIPHENHYDRAMINE HCL 25 MG
50 CAPSULE ORAL ONCE
OUTPATIENT
Start: 2025-02-01

## 2024-11-18 RX ORDER — ALBUTEROL SULFATE 0.83 MG/ML
2.5 SOLUTION RESPIRATORY (INHALATION)
OUTPATIENT
Start: 2025-02-01

## 2024-11-18 RX ORDER — METHYLPREDNISOLONE SODIUM SUCCINATE 40 MG/ML
40 INJECTION INTRAMUSCULAR; INTRAVENOUS
Start: 2025-02-01

## 2024-11-18 RX ORDER — DIPHENHYDRAMINE HYDROCHLORIDE 50 MG/ML
25 INJECTION INTRAMUSCULAR; INTRAVENOUS
Start: 2025-02-01

## 2024-11-18 RX ORDER — ACETAMINOPHEN 325 MG/1
650 TABLET ORAL ONCE
OUTPATIENT
Start: 2025-02-01

## 2024-11-18 RX ORDER — HEPARIN SODIUM (PORCINE) LOCK FLUSH IV SOLN 100 UNIT/ML 100 UNIT/ML
5 SOLUTION INTRAVENOUS
OUTPATIENT
Start: 2025-02-01

## 2024-11-18 RX ORDER — EPINEPHRINE 1 MG/ML
0.3 INJECTION, SOLUTION INTRAMUSCULAR; SUBCUTANEOUS EVERY 5 MIN PRN
OUTPATIENT
Start: 2025-02-01

## 2024-11-18 ASSESSMENT — PAIN SCALES - GENERAL: PAINLEVEL_OUTOF10: NO PAIN (0)

## 2024-11-18 NOTE — PATIENT INSTRUCTIONS
Proceed with next Ocrevus infusion in Feburary 2025 as scheduled    2.   Recommend that you take 5000 units of vitamin D daily on an ongoing basis    3.   Return to clinic in 6 months

## 2024-11-18 NOTE — Clinical Note
11/18/2024      Stefania Ashton  [address]  [place] [address]      Dear Colleague,    Thank you for referring your patient, Stefania Ashton, to the [facility] [place]. Please see a copy of my visit note below.    Referral source: Established patient    Chief complaint: Multiple sclerosis    History of the Present Illness: Ms. Stefania Ashton is a 39 year old woman who presents to the Multiple Sclerosis Clinic today for a scheduled follow up visit after earlier MRI scans of the brain and cervical spine.    The patient's history is as per my previous notes. Initial onset of symptoms of demyelinating disease was in 2015 when she had an episode of facial weakness. Several months later, she developed a new sixth nerve palsy, and MRI and CSF studies done at that time were consistent with multiple sclerosis. She was initially treated with glatiramer acetate, but had ongoing radiologic disease activity on that medication, leading to transition to the Plegridy formulation of interferon beta. She had further radiologic progression on that medication as well, and most recently was transitioned to ocrelizumab in 2019, with the last infusion completed on 8/26/2024.    Today, she denies  any new episodic changes in vision, balance, strength or sensation suggestive of new relapse of multiple sclerosis since she was last seen in this clinic.    PHYSICAL EXAMINATION:  VITAL SIGNS: Blood pressure 119/73; pulse 91; weight 134.6 kg; height 1.78 m.  GENERAL: Obese woman who presents to the evaluation alone, awake and alert and in no acute distress.    Investigations:  I reviewed images of MRI scan of the brain and cervical spine performed earlier on 11/7/2024, and the following is my independent interpretation of those images.    There is no abnormal enhancement with gadolinium contrast throughout the brain and cord parenchyma. The cervical cord appears normal in signal and contour. Multiple  periventricular, juxtacortical, and infratentorial foci of T2 hyperintensity in the white matter of the cerebral hemispheres are unchanged in comparison to earlier MRI from 11/02/2022. No new lesions are seen.    I also reviewed the results of blood tests performed on the date of the patient's last ocrelizumab infusion (8/26/2024). Hepatic panel was normal. CBC shows low MCV at 69 without anemia, suggestive of relative iron deficiency, stable. Total IgG level was normal at 1,199 mg/dL. Vitamin D level was 42 mcg/L.    Assessment/plan:    1. Multiple sclerosis  The patient is clinically and radiologically stable as regards any evidence of active inflammatory demyelination on disease modifying therapy with ocrelizumab. She will continue these infusions, with the next to be performed as scheduled on 2/26/2025.    I will see her back in 6 months for a review.    2. Vitamin D deficiency  Previously her level was 54 mcg/L on 5000 units of vitamin D daily. I advised her to remain on that dose on a long-term basis; goal range is 50-80.    The longitudinal plans of care for the diagnoses as documented were addressed during this visit. Due to the added complexity in care, I will continue to support the patient in subsequent management and with ongoing continuity of care.      Again, thank you for allowing me to participate in the care of your patient.        Sincerely,        Tico Peace MD

## 2024-11-18 NOTE — LETTER
11/18/2024      RE: Stefania Ashton  [address]  [place] [address]     Referral source: Established patient    Chief complaint: Multiple sclerosis    History of the Present Illness: Ms. Stefania Ashton is a 39 year old woman who presents to the Multiple Sclerosis Clinic today for a scheduled follow up visit after earlier MRI scans of the brain and cervical spine.    The patient's history is as per my previous notes. Initial onset of symptoms of demyelinating disease was in 2015 when she had an episode of facial weakness. Several months later, she developed a new sixth nerve palsy, and MRI and CSF studies done at that time were consistent with multiple sclerosis. She was initially treated with glatiramer acetate, but had ongoing radiologic disease activity on that medication, leading to transition to the Plegridy formulation of interferon beta. She had further radiologic progression on that medication as well, and most recently was transitioned to ocrelizumab in 2019, with the last infusion completed on 8/26/2024.    Today, she denies  any new episodic changes in vision, balance, strength or sensation suggestive of new relapse of multiple sclerosis since she was last seen in this clinic.    PHYSICAL EXAMINATION:  VITAL SIGNS: Blood pressure 119/73; pulse 91; weight 134.6 kg; height 1.78 m.  GENERAL: Obese woman who presents to the evaluation alone, awake and alert and in no acute distress.    Investigations:  I reviewed images of MRI scan of the brain and cervical spine performed earlier on 11/7/2024, and the following is my independent interpretation of those images.    There is no abnormal enhancement with gadolinium contrast throughout the brain and cord parenchyma. The cervical cord appears normal in signal and contour. Multiple periventricular, juxtacortical, and infratentorial foci of T2 hyperintensity in the white matter of the cerebral hemispheres are unchanged in comparison to earlier MRI from  11/02/2022. No new lesions are seen.    I also reviewed the results of blood tests performed on the date of the patient's last ocrelizumab infusion (8/26/2024). Hepatic panel was normal. CBC shows low MCV at 69 without anemia, suggestive of relative iron deficiency, stable. Total IgG level was normal at 1,199 mg/dL. Vitamin D level was 42 mcg/L.      Assessment/plan:    1. Multiple sclerosis  The patient is clinically and radiologically stable as regards any evidence of active inflammatory demyelination on disease modifying therapy with ocrelizumab. She will continue these infusions, with the next to be performed as scheduled on 2/26/2025.    I will see her back in 6 months for a review.    2. Vitamin D deficiency  Previously her level was 54 mcg/L on 5000 units of vitamin D daily. I advised her to remain on that dose on a long-term basis; goal range is 50-80.    The longitudinal plans of care for the diagnoses as documented were addressed during this visit. Due to the added complexity in care, I will continue to support the patient in subsequent management and with ongoing continuity of care.    Tico Peace MD   of Neurology  [facility]    Cc:  Candelaria Devlin CNP (PCP)  Patient

## 2024-11-21 NOTE — PROGRESS NOTES
Referral source: Established patient    Chief complaint: Multiple sclerosis    History of the Present Illness: Ms. Stefania Ashton is a 39 year old woman who presents to the Multiple Sclerosis Clinic today for a scheduled follow up visit after earlier MRI scans of the brain and cervical spine.    The patient's history is as per my previous notes. Initial onset of symptoms of demyelinating disease was in 2015 when she had an episode of facial weakness. Several months later, she developed a new sixth nerve palsy, and MRI and CSF studies done at that time were consistent with multiple sclerosis. She was initially treated with glatiramer acetate, but had ongoing radiologic disease activity on that medication, leading to transition to the Plegridy formulation of interferon beta. She had further radiologic progression on that medication as well, and most recently was transitioned to ocrelizumab in 2019, with the last infusion completed on 8/26/2024.    Today, she denies  any new episodic changes in vision, balance, strength or sensation suggestive of new relapse of multiple sclerosis since she was last seen in this clinic.    PHYSICAL EXAMINATION:  VITAL SIGNS: Blood pressure 119/73; pulse 91; weight 134.6 kg; height 1.78 m.  GENERAL: Obese woman who presents to the evaluation alone, awake and alert and in no acute distress.    Investigations:  I reviewed images of MRI scan of the brain and cervical spine performed earlier on 11/7/2024, and the following is my independent interpretation of those images.    There is no abnormal enhancement with gadolinium contrast throughout the brain and cord parenchyma. The cervical cord appears normal in signal and contour. Multiple periventricular, juxtacortical, and infratentorial foci of T2 hyperintensity in the white matter of the cerebral hemispheres are unchanged in comparison to earlier MRI from 11/02/2022. No new lesions are seen.    I also reviewed the results of blood tests  performed on the date of the patient's last ocrelizumab infusion (8/26/2024). Hepatic panel was normal. CBC shows low MCV at 69 without anemia, suggestive of relative iron deficiency, stable. Total IgG level was normal at 1,199 mg/dL. Vitamin D level was 42 mcg/L.    Assessment/plan:    1. Multiple sclerosis  The patient is clinically and radiologically stable as regards any evidence of active inflammatory demyelination on disease modifying therapy with ocrelizumab. She will continue these infusions, with the next to be performed as scheduled on 2/26/2025.    I will see her back in 6 months for a review.    2. Vitamin D deficiency  Previously her level was 54 mcg/L on 5000 units of vitamin D daily. I advised her to remain on that dose on a long-term basis; goal range is 50-80.    The longitudinal plans of care for the diagnoses as documented were addressed during this visit. Due to the added complexity in care, I will continue to support the patient in subsequent management and with ongoing continuity of care.

## 2025-02-26 ENCOUNTER — INFUSION THERAPY VISIT (OUTPATIENT)
Dept: INFUSION THERAPY | Facility: CLINIC | Age: 40
End: 2025-02-26
Attending: PSYCHIATRY & NEUROLOGY
Payer: COMMERCIAL

## 2025-02-26 VITALS
DIASTOLIC BLOOD PRESSURE: 85 MMHG | TEMPERATURE: 98.1 F | HEART RATE: 78 BPM | WEIGHT: 292.2 LBS | OXYGEN SATURATION: 100 % | BODY MASS INDEX: 41.93 KG/M2 | RESPIRATION RATE: 16 BRPM | SYSTOLIC BLOOD PRESSURE: 138 MMHG

## 2025-02-26 DIAGNOSIS — G35 MS (MULTIPLE SCLEROSIS) (H): Primary | ICD-10-CM

## 2025-02-26 PROCEDURE — 258N000003 HC RX IP 258 OP 636: Performed by: PSYCHIATRY & NEUROLOGY

## 2025-02-26 PROCEDURE — 96365 THER/PROPH/DIAG IV INF INIT: CPT

## 2025-02-26 PROCEDURE — 250N000013 HC RX MED GY IP 250 OP 250 PS 637: Performed by: PSYCHIATRY & NEUROLOGY

## 2025-02-26 PROCEDURE — 99207 PR NO CHARGE LOS: CPT

## 2025-02-26 PROCEDURE — 96375 TX/PRO/DX INJ NEW DRUG ADDON: CPT

## 2025-02-26 PROCEDURE — 96366 THER/PROPH/DIAG IV INF ADDON: CPT

## 2025-02-26 PROCEDURE — 250N000011 HC RX IP 250 OP 636: Mod: JZ | Performed by: PSYCHIATRY & NEUROLOGY

## 2025-02-26 RX ORDER — ACETAMINOPHEN 325 MG/1
650 TABLET ORAL ONCE
OUTPATIENT
Start: 2025-08-25

## 2025-02-26 RX ORDER — MEPERIDINE HYDROCHLORIDE 25 MG/ML
25 INJECTION INTRAMUSCULAR; INTRAVENOUS; SUBCUTANEOUS
OUTPATIENT
Start: 2025-08-25

## 2025-02-26 RX ORDER — DIPHENHYDRAMINE HYDROCHLORIDE 50 MG/ML
25 INJECTION INTRAMUSCULAR; INTRAVENOUS
Start: 2025-08-25

## 2025-02-26 RX ORDER — DIPHENHYDRAMINE HYDROCHLORIDE 50 MG/ML
50 INJECTION INTRAMUSCULAR; INTRAVENOUS
Start: 2025-08-25

## 2025-02-26 RX ORDER — METHYLPREDNISOLONE SODIUM SUCCINATE 125 MG/2ML
125 INJECTION INTRAMUSCULAR; INTRAVENOUS ONCE
OUTPATIENT
Start: 2025-08-25

## 2025-02-26 RX ORDER — DIPHENHYDRAMINE HCL 25 MG
50 CAPSULE ORAL ONCE
OUTPATIENT
Start: 2025-08-25

## 2025-02-26 RX ORDER — METHYLPREDNISOLONE SODIUM SUCCINATE 125 MG/2ML
125 INJECTION INTRAMUSCULAR; INTRAVENOUS ONCE
Status: COMPLETED | OUTPATIENT
Start: 2025-02-26 | End: 2025-02-26

## 2025-02-26 RX ORDER — METHYLPREDNISOLONE SODIUM SUCCINATE 40 MG/ML
40 INJECTION INTRAMUSCULAR; INTRAVENOUS
Start: 2025-08-25

## 2025-02-26 RX ORDER — HEPARIN SODIUM (PORCINE) LOCK FLUSH IV SOLN 100 UNIT/ML 100 UNIT/ML
5 SOLUTION INTRAVENOUS
OUTPATIENT
Start: 2025-08-25

## 2025-02-26 RX ORDER — HEPARIN SODIUM,PORCINE 10 UNIT/ML
5-20 VIAL (ML) INTRAVENOUS DAILY PRN
OUTPATIENT
Start: 2025-08-25

## 2025-02-26 RX ORDER — EPINEPHRINE 1 MG/ML
0.3 INJECTION, SOLUTION INTRAMUSCULAR; SUBCUTANEOUS EVERY 5 MIN PRN
OUTPATIENT
Start: 2025-08-25

## 2025-02-26 RX ORDER — ALBUTEROL SULFATE 90 UG/1
1-2 INHALANT RESPIRATORY (INHALATION)
Start: 2025-08-25

## 2025-02-26 RX ORDER — ACETAMINOPHEN 325 MG/1
650 TABLET ORAL ONCE
Status: COMPLETED | OUTPATIENT
Start: 2025-02-26 | End: 2025-02-26

## 2025-02-26 RX ORDER — DIPHENHYDRAMINE HCL 25 MG
50 CAPSULE ORAL ONCE
Status: COMPLETED | OUTPATIENT
Start: 2025-02-26 | End: 2025-02-26

## 2025-02-26 RX ORDER — ALBUTEROL SULFATE 0.83 MG/ML
2.5 SOLUTION RESPIRATORY (INHALATION)
OUTPATIENT
Start: 2025-08-25

## 2025-02-26 RX ADMIN — OCRELIZUMAB 600 MG: 300 INJECTION INTRAVENOUS at 09:10

## 2025-02-26 RX ADMIN — METHYLPREDNISOLONE SODIUM SUCCINATE 125 MG: 125 INJECTION, POWDER, FOR SOLUTION INTRAMUSCULAR; INTRAVENOUS at 08:59

## 2025-02-26 RX ADMIN — ACETAMINOPHEN 650 MG: 325 TABLET ORAL at 08:57

## 2025-02-26 RX ADMIN — SODIUM CHLORIDE 100 ML: 9 INJECTION, SOLUTION INTRAVENOUS at 08:58

## 2025-02-26 RX ADMIN — DIPHENHYDRAMINE HYDROCHLORIDE 50 MG: 25 CAPSULE ORAL at 08:57

## 2025-02-26 NOTE — PROGRESS NOTES
Infusion Nursing Note:  Stefaniamandie Ashton presents today for Non-Rapid Ocrevus.    Patient seen by provider today: No   present during visit today: Not Applicable.    Note: Premedications of tylenol, benadryl and solumedrol given.    Ocrevus rates due to prior reactions: Start rate at 40 mL/hr, increase by 40 mL/hr every 30 minutes to a max of 200 mL/hr.    Intravenous Access:  Peripheral IV placed.    Treatment Conditions:  Biological Infusion Checklist:  ~~~ NOTE: If the patient answers yes to any of the questions below, hold the infusion and contact ordering provider or on-call provider.    Have you recently had an elevated temperature, fever, chills, productive cough, coughing for 3 weeks or longer or hemoptysis,  abnormal vital signs, night sweats,  chest pain or have you noticed a decrease in your appetite, unexplained weight loss or fatigue? No  Do you have any open wounds or new incisions? No  Do you have any upcoming hospitalizations or surgeries? Does not include esophagogastroduodenoscopy, colonoscopy, endoscopic retrograde cholangiopancreatography (ERCP), endoscopic ultrasound (EUS), dental procedures or joint aspiration/steroid injections No  Do you currently have any signs of illness or infection or are you on any antibiotics? No  Have you had any new, sudden or worsening abdominal pain? No  Have you or anyone in your household received a live vaccination in the past 4 weeks? Please note: No live vaccines while on biologic/chemotherapy until 6 months after the last treatment. Patient can receive the flu vaccine (shot only), pneumovax and the Covid vaccine. It is optimal for the patient to get these vaccines mid cycle, but they can be given at any time as long as it is not on the day of the infusion. No  Have you recently been diagnosed with any new nervous system diseases (ie. Multiple sclerosis, Guillain [place], seizures, neurological changes) or cancer diagnosis? Are you on any form of  radiation or chemotherapy? No  Are you pregnant or breast feeding or do you have plans of pregnancy in the future? No  Have there been any other new onset medical symptoms? No    Post Infusion Assessment:  Patient tolerated infusion without incident.  Patient observed for 60 minutes post Ocrevus per protocol.  Blood return noted pre and post infusion.  Site patent and intact, free from redness, edema or discomfort.  No evidence of extravasations.  Access discontinued per protocol.  Biologic Infusion Post Education: Call the triage nurse at your clinic or seek medical attention if you have chills and/or temperature greater than or equal to 100.5, uncontrolled nausea/vomiting, diarrhea, constipation, dizziness, shortness of breath, chest pain, heart palpitations, weakness or any other new or concerning symptoms, questions or concerns.  You cannot have any live virus vaccines prior to or during treatment or up to 6 months post infusion.  If you have an upcoming surgery, medical procedure or dental procedure during treatment, this should be discussed with your ordering physician and your surgeon/dentist.  If you are having any concerning symptom, if you are unsure if you should get your next infusion or wish to speak to a provider before your next infusion, please call your care coordinator or triage nurse at your clinic to notify them so we can adequately serve you.     Discharge Plan:   Discharge instructions reviewed with: Patient.  Patient and/or family verbalized understanding of discharge instructions and all questions answered.  Patient discharged in stable condition accompanied by: self.  Departure Mode: Ambulatory.  Future appts have been reviewed and crosschecked with appt note and plan.    Lu Murphy RN

## 2025-03-29 ENCOUNTER — HEALTH MAINTENANCE LETTER (OUTPATIENT)
Age: 40
End: 2025-03-29

## 2025-04-04 ENCOUNTER — ANCILLARY ORDERS (OUTPATIENT)
Dept: OBGYN | Facility: CLINIC | Age: 40
End: 2025-04-04
Payer: COMMERCIAL

## 2025-04-04 DIAGNOSIS — Z12.31 VISIT FOR SCREENING MAMMOGRAM: Primary | ICD-10-CM

## 2025-04-24 ENCOUNTER — OFFICE VISIT (OUTPATIENT)
Dept: URGENT CARE | Facility: URGENT CARE | Age: 40
End: 2025-04-24
Payer: COMMERCIAL

## 2025-04-24 VITALS
HEIGHT: 70 IN | RESPIRATION RATE: 18 BRPM | BODY MASS INDEX: 41.52 KG/M2 | SYSTOLIC BLOOD PRESSURE: 141 MMHG | HEART RATE: 92 BPM | OXYGEN SATURATION: 100 % | TEMPERATURE: 98.6 F | WEIGHT: 290 LBS | DIASTOLIC BLOOD PRESSURE: 89 MMHG

## 2025-04-24 DIAGNOSIS — J01.10 ACUTE NON-RECURRENT FRONTAL SINUSITIS: ICD-10-CM

## 2025-04-24 DIAGNOSIS — J02.9 SORE THROAT: Primary | ICD-10-CM

## 2025-04-24 LAB
DEPRECATED S PYO AG THROAT QL EIA: NEGATIVE
FLUAV AG SPEC QL IA: NEGATIVE
FLUBV AG SPEC QL IA: NEGATIVE
S PYO DNA THROAT QL NAA+PROBE: NOT DETECTED

## 2025-04-24 RX ORDER — IBUPROFEN 200 MG
600 TABLET ORAL ONCE
Status: COMPLETED | OUTPATIENT
Start: 2025-04-24 | End: 2025-04-24

## 2025-04-24 RX ADMIN — Medication 600 MG: at 10:49

## 2025-04-24 ASSESSMENT — ENCOUNTER SYMPTOMS
EYES NEGATIVE: 1
FEVER: 1
COUGH: 1
DIAPHORESIS: 1
APPETITE CHANGE: 1
SORE THROAT: 1
RHINORRHEA: 1
CHILLS: 1
SINUS PRESSURE: 1
FACIAL SWELLING: 0

## 2025-04-24 ASSESSMENT — PAIN SCALES - GENERAL: PAINLEVEL_OUTOF10: SEVERE PAIN (8)

## 2025-04-24 NOTE — LETTER
April 24, 2025      Stefania Ashton  [address]   [place] [address]        To Whom It May Concern:    Stefania Ashton  was seen on 4/24/25.  Please excuse her  until 4/26/25 due to illness..        Sincerely,        Hardik Salinas MD    Electronically signed

## 2025-04-24 NOTE — PROGRESS NOTES
Urgent Care Clinic Visit    Chief Complaint   Patient presents with    Pharyngitis     Sore/itchy throat, nausea, headache (sinus pressure), cough, chills              4/24/2025    10:25 AM   Additional Questions   Roomed by Sarah   Accompanied by Self         4/24/2025   Forms   Any forms needing to be completed Yes

## 2025-04-24 NOTE — PROGRESS NOTES
Patient presents with:  Pharyngitis: Sore/itchy throat left side, nausea, headache (sinus pressure), cough, chills, left ear pain       Clinical Decision Making:      ICD-10-CM    1. Sore throat  J02.9 Influenza A & B Antigen - Clinic Collect     Streptococcus A Rapid Screen w/Reflex to PCR - Clinic Collect     ibuprofen (ADVIL/MOTRIN) tablet 600 mg     Group A Streptococcus PCR Throat Swab      2. Acute non-recurrent frontal sinusitis  J01.10 amoxicillin-clavulanate (AUGMENTIN) 875-125 MG tablet        Ddx includes  - Viral URI  - Strep - neg  - Flu - pending  - Sinusitis - potential, had fever last night, so will prescribe augmentin for sinusitis  - AOM - neg exam    There are no Patient Instructions on file for this visit.    HPI:  Stefania Ashton is a 40 year old female who presents today complaining of sore throat.    Started 2 days ago as a mild itch in the throat. Did not sleep well from new headache. Pressure in her forehead, no tenderness. Taking some tylenol, which didn't help.     Feeling chills and sweats. Having L ear pain that is coinciding with the L throat pain. Cough and sneezing. Cough is mild, but definitely there.    History obtained from the patient.    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, thymus persistent  2015-12: Abnormal MRI of head  2015-12: MS (multiple sclerosis) (H)  2015-10: Urge incontinence  2015-08: Adjustment disorder with mixed emotional features  2014-05:  "Obesity  2014-05: History of cervical dysplasia  2010-10: CARDIOVASCULAR SCREENING; LDL GOAL LESS THAN 160  2009-05: Hemorrhage with retained, trapped or adherent placenta  2009-05: Normal delivery  Hyperlipidemia with target LDL less than 160  Vitamin D deficiency  Hypertension goal BP (blood pressure) < 140/90  Bell's palsy  Microcytic anemia  Mediastinal mass  Thymus hyperplasia      Past Medical History:   Diagnosis Date    ASCUS with positive high risk HPV 2012    Bell's palsy     Cervical dysplasia 2012    Genital herpes     Hyperlipidemia LDL goal < 160     Hypertension goal BP (blood pressure) < 140/90     Major depression, single episode     Microcytic anemia     MS (multiple sclerosis) (H) 12/3/2015    Obesity 5/20/2014    Vitamin D deficiency        Social History     Tobacco Use    Smoking status: Former     Current packs/day: 0.00     Average packs/day: 1 pack/day for 11.4 years (11.4 ttl pk-yrs)     Types: Cigarettes     Start date: 1/1/1998     Quit date: 5/20/2009     Years since quitting: 15.9    Smokeless tobacco: Never   Substance Use Topics    Alcohol use: Yes     Alcohol/week: 2.0 standard drinks of alcohol     Types: 2 Glasses of wine per week     Comment: MONTHLY       Review of Systems   Constitutional:  Positive for appetite change, chills, diaphoresis and fever.   HENT:  Positive for congestion, ear pain, rhinorrhea, sinus pressure and sore throat. Negative for ear discharge, facial swelling and hearing loss.    Eyes: Negative.    Respiratory:  Positive for cough.        Vitals:    04/24/25 1023   BP: (!) 141/89   BP Location: Left arm   Patient Position: Sitting   Cuff Size: Adult Large   Pulse: 92   Resp: 18   Temp: 98.6  F (37  C)   TempSrc: Oral   SpO2: 100%   Weight: 131.5 kg (290 lb)   Height: 1.778 m (5' 10\")       Physical Exam  Constitutional:       Appearance: Normal appearance. She is ill-appearing. She is not toxic-appearing.   HENT:      Right Ear: Tympanic membrane and ear " canal normal.      Left Ear: Tympanic membrane and ear canal normal.      Nose: Congestion present.      Mouth/Throat:      Pharynx: Posterior oropharyngeal erythema present. No oropharyngeal exudate.   Eyes:      General:         Right eye: No discharge.         Left eye: No discharge.      Conjunctiva/sclera: Conjunctivae normal.   Cardiovascular:      Rate and Rhythm: Normal rate.   Pulmonary:      Effort: Pulmonary effort is normal.   Lymphadenopathy:      Cervical: No cervical adenopathy.   Neurological:      Mental Status: She is alert.         Results:  Results for orders placed or performed in visit on 04/24/25   Influenza A & B Antigen - Clinic Collect     Status: Normal    Specimen: Nose; Swab   Result Value Ref Range    Influenza A antigen Negative Negative    Influenza B antigen Negative Negative    Narrative    Test results must be correlated with clinical data. If necessary, results should be confirmed by a molecular assay or viral culture.   Streptococcus A Rapid Screen w/Reflex to PCR - Clinic Collect     Status: Normal    Specimen: Throat; Swab   Result Value Ref Range    Group A Strep antigen Negative Negative         At the end of the encounter, I discussed results, diagnosis, medications. Discussed indications for follow up if no improvement. Patient understood and agreed to plan. Patient was stable for discharge.    Hardik Salinas MD, MPH

## 2025-05-19 ENCOUNTER — OFFICE VISIT (OUTPATIENT)
Dept: NEUROLOGY | Facility: CLINIC | Age: 40
End: 2025-05-19
Payer: COMMERCIAL

## 2025-05-19 VITALS
BODY MASS INDEX: 41.95 KG/M2 | HEIGHT: 70 IN | DIASTOLIC BLOOD PRESSURE: 84 MMHG | WEIGHT: 293 LBS | HEART RATE: 73 BPM | SYSTOLIC BLOOD PRESSURE: 127 MMHG

## 2025-05-19 DIAGNOSIS — E55.9 VITAMIN D DEFICIENCY: ICD-10-CM

## 2025-05-19 DIAGNOSIS — G35 MS (MULTIPLE SCLEROSIS) (H): Primary | ICD-10-CM

## 2025-05-19 PROCEDURE — 3079F DIAST BP 80-89 MM HG: CPT | Performed by: PSYCHIATRY & NEUROLOGY

## 2025-05-19 PROCEDURE — 1126F AMNT PAIN NOTED NONE PRSNT: CPT | Performed by: PSYCHIATRY & NEUROLOGY

## 2025-05-19 PROCEDURE — 99214 OFFICE O/P EST MOD 30 MIN: CPT | Performed by: PSYCHIATRY & NEUROLOGY

## 2025-05-19 PROCEDURE — G2211 COMPLEX E/M VISIT ADD ON: HCPCS | Performed by: PSYCHIATRY & NEUROLOGY

## 2025-05-19 PROCEDURE — 3074F SYST BP LT 130 MM HG: CPT | Performed by: PSYCHIATRY & NEUROLOGY

## 2025-05-19 ASSESSMENT — PAIN SCALES - GENERAL: PAINLEVEL_OUTOF10: NO PAIN (0)

## 2025-05-19 NOTE — LETTER
5/19/2025      RE: Stefania Ashton  [address]  [place] [address]     Referral source: Established patient    Chief complaint: Multiple sclerosis    History of the Present Illness: Ms Stefania Ashton is a 40 year old right-handed woman who presents to the [facility] today for a scheduled follow up visit regarding her diagnosis of multiple sclerosis.    The patient's history is as per my previous notes. Initial onset of symptoms of demyelinating disease was in 2015 when she had an episode of facial weakness. Several months later, she developed a new sixth nerve palsy, and MRI and CSF studies done at that time were consistent with multiple sclerosis. She was initially treated with glatiramer acetate, but had ongoing radiologic disease activity on that medication, leading to transition to the Plegridy formulation of interferon beta. She had further radiologic progression on that medication as well, and most recently was transitioned to ocrelizumab in 2019, with the last infusion completed on 2/26/2025. She tolerated this without difficulty.    Today, she denies any new, episodic changes in vision, balance, strength, or sensation suggestive of new relapse of multiple sclerosis since she was last seen in this clinic.    She does not have any specific concerns today.    PHYSICAL EXAMINATION:  VITAL SIGNS: Blood pressure 127/84; pulse 73; weight 133.1 kg; height 1.78 m.  GENERAL: Obese woman who presents to the examination alone, awake and alert and in no acute distress.     NEUROLOGIC EXAMINATION:  CRANIAL NERVES: Visual fields are full to confrontation.  Extraocular movements are intact with no internuclear ophthalmoplegia.  Facial strength is normal.  Palate elevation and tongue protrusion are normal.  POWER: Strength is within normal limits in proximal and distal muscles in the upper and lower limbs throughout.  REFLEXES: Reflexes are symmetric and within normal limits in the arms and  legs.  MOTOR/CEREBELLAR: There is no appendicular ataxia on finger-to-nose testing.  Rapid alternating movements are within normal limits in the hands and fingers.  There is no pronator drift in the arms.  GAIT: The patient is able to ambulate on a flat, level surface with no gross loss of postural stability, and able to walk on heels, toes and in tandem.    Assessment/plan:    1. Multiple sclerosis  The patient is clinically stable as regards any evidence of active demyelination on current disease modifying therapy with ocrelizumab. She will continue this medication, with the next infusion to be performed as scheduled on 8/26/2025.    Prior to the infusion I will obtain routine laboratory studies for monitoring of this medication to include complete blood counts with differential, hepatic panel, and total antibody (IgG) level.    I will see her back in 6 months for a review.    2. Vitamin D deficiency  I will also obtain a vitamin D level on the date of her next laboratory draw and advise her on supplementation with vitamin D as needed to maintain her level within our goal range of 60-80 mcg/L. Currently, she reports taking approximately 5000 units of vitamin D weekly.    The longitudinal plans of care for the diagnoses as documented were addressed during this visit. Due to the added complexity in care, I will continue to support the patient in subsequent management and with ongoing continuity of care.    Tico Peace MD   of Neurology  [facility]    Cc:  Candelaria Devlin CNP (PCP)  Patient

## 2025-05-19 NOTE — PATIENT INSTRUCTIONS
Proceed with next Ocrevus infusion on August 26, 2025 as scheduled    2.   Blood tests on the date of infusion    3.   Return to clinic in 6 months

## 2025-05-19 NOTE — Clinical Note
5/19/2025      Stefania Ashton  [address]  [place] [address]      Dear Colleague,    Thank you for referring your patient, Stefania Ashton, to the [facility] [place]. Please see a copy of my visit note below.    Referral source: Established patient    Chief complaint: Multiple sclerosis    History of the Present Illness: Ms Stefania Ashton is a 40 year old right-handed woman who presents to the [facility]clerosis Clinic today for a scheduled follow up visit regarding her diagnosis of multiple sclerosis.    The patient's history is as per my previous notes. Initial onset of symptoms of demyelinating disease was in 2015 when she had an episode of facial weakness. Several months later, she developed a new sixth nerve palsy, and MRI and CSF studies done at that time were consistent with multiple sclerosis. She was initially treated with glatiramer acetate, but had ongoing radiologic disease activity on that medication, leading to transition to the Plegridy formulation of interferon beta. She had further radiologic progression on that medication as well, and most recently was transitioned to ocrelizumab in 2019, with the last infusion completed on 2/26/2025. She tolerated this without difficulty.    Today, she denies any new, episodic changes in vision, balance, strength, or sensation suggestive of new relapse of multiple sclerosis since she was last seen in this clinic.    She does not have any specific concerns today.    PHYSICAL EXAMINATION:  VITAL SIGNS: Blood pressure 127/84; pulse 73; weight 133.1 kg; height 1.78 m.  GENERAL: Obese woman who presents to the examination alone, awake and alert and in no acute distress.     NEUROLOGIC EXAMINATION:  CRANIAL NERVES: Visual fields are full to confrontation.  Extraocular movements are intact with no internuclear ophthalmoplegia.  Facial strength is normal.  Palate elevation and tongue protrusion are normal.  POWER: Strength is  within normal limits in proximal and distal muscles in the upper and lower limbs throughout.  REFLEXES: Reflexes are symmetric and within normal limits in the arms and legs.  MOTOR/CEREBELLAR: There is no appendicular ataxia on finger-to-nose testing.  Rapid alternating movements are within normal limits in the hands and fingers.  There is no pronator drift in the arms.  GAIT: The patient is able to ambulate on a flat, level surface with no gross loss of postural stability, and able to walk on heels, toes and in tandem.    Assessment/plan:    1. Multiple sclerosis  The patient is clinically stable as regards any evidence of active demyelination on current disease modifying therapy with ocrelizumab. She will continue this medication, with the next infusion to be performed as scheduled on 8/26/2025.    Prior to the infusion I will obtain routine laboratory studies for monitoring of this medication to include complete blood counts with differential, hepatic panel, and total antibody (IgG) level.    I will see her back in 6 months for a review.    2. Vitamin D deficiency  I will also obtain a vitamin D level on the date of her next laboratory draw and advise her on supplementation with vitamin D as needed to maintain her level within our goal range of 60-80 mcg/L. Currently, she reports taking approximately 5000 units of vitamin D weekly.    The longitudinal plans of care for the diagnoses as documented were addressed during this visit. Due to the added complexity in care, I will continue to support the patient in subsequent management and with ongoing continuity of care.      Again, thank you for allowing me to participate in the care of your patient.        Sincerely,        Tico Peace MD    Electronically signed

## 2025-05-20 NOTE — PROGRESS NOTES
Referral source: Established patient    Chief complaint: Multiple sclerosis    History of the Present Illness: Ms Stefania Ashton is a 40 year old right-handed woman who presents to the [facility]clerosis Clinic today for a scheduled follow up visit regarding her diagnosis of multiple sclerosis.    The patient's history is as per my previous notes. Initial onset of symptoms of demyelinating disease was in 2015 when she had an episode of facial weakness. Several months later, she developed a new sixth nerve palsy, and MRI and CSF studies done at that time were consistent with multiple sclerosis. She was initially treated with glatiramer acetate, but had ongoing radiologic disease activity on that medication, leading to transition to the Plegridy formulation of interferon beta. She had further radiologic progression on that medication as well, and most recently was transitioned to ocrelizumab in 2019, with the last infusion completed on 2/26/2025. She tolerated this without difficulty.    Today, she denies any new, episodic changes in vision, balance, strength, or sensation suggestive of new relapse of multiple sclerosis since she was last seen in this clinic.    She does not have any specific concerns today.    PHYSICAL EXAMINATION:  VITAL SIGNS: Blood pressure 127/84; pulse 73; weight 133.1 kg; height 1.78 m.  GENERAL: Obese woman who presents to the examination alone, awake and alert and in no acute distress.     NEUROLOGIC EXAMINATION:  CRANIAL NERVES: Visual fields are full to confrontation.  Extraocular movements are intact with no internuclear ophthalmoplegia.  Facial strength is normal.  Palate elevation and tongue protrusion are normal.  POWER: Strength is within normal limits in proximal and distal muscles in the upper and lower limbs throughout.  REFLEXES: Reflexes are symmetric and within normal limits in the arms and legs.  MOTOR/CEREBELLAR: There is no appendicular ataxia on finger-to-nose testing.  Rapid  alternating movements are within normal limits in the hands and fingers.  There is no pronator drift in the arms.  GAIT: The patient is able to ambulate on a flat, level surface with no gross loss of postural stability, and able to walk on heels, toes and in tandem.    Assessment/plan:    1. Multiple sclerosis  The patient is clinically stable as regards any evidence of active demyelination on current disease modifying therapy with ocrelizumab. She will continue this medication, with the next infusion to be performed as scheduled on 8/26/2025.    Prior to the infusion I will obtain routine laboratory studies for monitoring of this medication to include complete blood counts with differential, hepatic panel, and total antibody (IgG) level.    I will see her back in 6 months for a review.    2. Vitamin D deficiency  I will also obtain a vitamin D level on the date of her next laboratory draw and advise her on supplementation with vitamin D as needed to maintain her level within our goal range of 60-80 mcg/L. Currently, she reports taking approximately 5000 units of vitamin D weekly.    The longitudinal plans of care for the diagnoses as documented were addressed during this visit. Due to the added complexity in care, I will continue to support the patient in subsequent management and with ongoing continuity of care.

## 2025-06-06 PROBLEM — S82.55XA CLOSED NONDISPLACED FRACTURE OF MEDIAL MALLEOLUS OF LEFT TIBIA, INITIAL ENCOUNTER: Status: RESOLVED | Noted: 2022-11-07 | Resolved: 2025-06-06

## 2025-06-10 SDOH — HEALTH STABILITY: PHYSICAL HEALTH
ON AVERAGE, HOW MANY DAYS PER WEEK DO YOU ENGAGE IN MODERATE TO STRENUOUS EXERCISE (LIKE A BRISK WALK)?: PATIENT DECLINED

## 2025-06-10 SDOH — HEALTH STABILITY: PHYSICAL HEALTH: ON AVERAGE, HOW MANY MINUTES DO YOU ENGAGE IN EXERCISE AT THIS LEVEL?: PATIENT DECLINED

## 2025-06-10 ASSESSMENT — SOCIAL DETERMINANTS OF HEALTH (SDOH): HOW OFTEN DO YOU GET TOGETHER WITH FRIENDS OR RELATIVES?: TWICE A WEEK

## 2025-06-25 SDOH — HEALTH STABILITY: PHYSICAL HEALTH: ON AVERAGE, HOW MANY DAYS PER WEEK DO YOU ENGAGE IN MODERATE TO STRENUOUS EXERCISE (LIKE A BRISK WALK)?: 1 DAY

## 2025-06-25 SDOH — HEALTH STABILITY: PHYSICAL HEALTH: ON AVERAGE, HOW MANY MINUTES DO YOU ENGAGE IN EXERCISE AT THIS LEVEL?: PATIENT DECLINED

## 2025-06-25 ASSESSMENT — SOCIAL DETERMINANTS OF HEALTH (SDOH): HOW OFTEN DO YOU GET TOGETHER WITH FRIENDS OR RELATIVES?: TWICE A WEEK

## 2025-06-26 ENCOUNTER — OFFICE VISIT (OUTPATIENT)
Dept: INTERNAL MEDICINE | Facility: CLINIC | Age: 40
End: 2025-06-26
Payer: COMMERCIAL

## 2025-06-26 VITALS
BODY MASS INDEX: 41.95 KG/M2 | DIASTOLIC BLOOD PRESSURE: 84 MMHG | RESPIRATION RATE: 20 BRPM | HEIGHT: 70 IN | OXYGEN SATURATION: 99 % | TEMPERATURE: 97.3 F | WEIGHT: 293 LBS | SYSTOLIC BLOOD PRESSURE: 124 MMHG | HEART RATE: 76 BPM

## 2025-06-26 DIAGNOSIS — E66.813 CLASS 3 OBESITY (H): ICD-10-CM

## 2025-06-26 DIAGNOSIS — D64.9 ANEMIA, UNSPECIFIED TYPE: ICD-10-CM

## 2025-06-26 DIAGNOSIS — E66.01 MORBID OBESITY (H): ICD-10-CM

## 2025-06-26 DIAGNOSIS — Z13.29 SCREENING FOR ENDOCRINE DISORDER: ICD-10-CM

## 2025-06-26 DIAGNOSIS — E78.00 HIGH CHOLESTEROL: ICD-10-CM

## 2025-06-26 DIAGNOSIS — Z00.00 ROUTINE GENERAL MEDICAL EXAMINATION AT A HEALTH CARE FACILITY: Primary | ICD-10-CM

## 2025-06-26 DIAGNOSIS — I10 HYPERTENSION GOAL BP (BLOOD PRESSURE) < 140/90: ICD-10-CM

## 2025-06-26 LAB
ALBUMIN SERPL BCG-MCNC: 3.9 G/DL (ref 3.5–5.2)
ALP SERPL-CCNC: 87 U/L (ref 40–150)
ALT SERPL W P-5'-P-CCNC: 7 U/L (ref 0–50)
ANION GAP SERPL CALCULATED.3IONS-SCNC: 9 MMOL/L (ref 7–15)
AST SERPL W P-5'-P-CCNC: 23 U/L (ref 0–45)
BASOPHILS # BLD AUTO: 0 10E3/UL (ref 0–0.2)
BASOPHILS NFR BLD AUTO: 1 %
BILIRUB SERPL-MCNC: 0.2 MG/DL
BUN SERPL-MCNC: 10 MG/DL (ref 6–20)
CALCIUM SERPL-MCNC: 9.4 MG/DL (ref 8.8–10.4)
CHLORIDE SERPL-SCNC: 107 MMOL/L (ref 98–107)
CHOLEST SERPL-MCNC: 213 MG/DL
CREAT SERPL-MCNC: 0.79 MG/DL (ref 0.51–0.95)
EGFRCR SERPLBLD CKD-EPI 2021: >90 ML/MIN/1.73M2
EOSINOPHIL # BLD AUTO: 0.1 10E3/UL (ref 0–0.7)
EOSINOPHIL NFR BLD AUTO: 1 %
ERYTHROCYTE [DISTWIDTH] IN BLOOD BY AUTOMATED COUNT: 16 % (ref 10–15)
FASTING STATUS PATIENT QL REPORTED: NO
FASTING STATUS PATIENT QL REPORTED: NO
FERRITIN SERPL-MCNC: 30 NG/ML (ref 6–175)
GLUCOSE SERPL-MCNC: 93 MG/DL (ref 70–99)
HCO3 SERPL-SCNC: 24 MMOL/L (ref 22–29)
HCT VFR BLD AUTO: 37.3 % (ref 35–47)
HDLC SERPL-MCNC: 66 MG/DL
HGB BLD-MCNC: 11.6 G/DL (ref 11.7–15.7)
IMM GRANULOCYTES # BLD: 0 10E3/UL
IMM GRANULOCYTES NFR BLD: 0 %
IRON BINDING CAPACITY (ROCHE): NORMAL
IRON SATN MFR SERPL: NORMAL %
IRON SERPL-MCNC: 41 UG/DL (ref 37–145)
LDLC SERPL CALC-MCNC: 138 MG/DL
LYMPHOCYTES # BLD AUTO: 1.2 10E3/UL (ref 0.8–5.3)
LYMPHOCYTES NFR BLD AUTO: 26 %
MCH RBC QN AUTO: 21.8 PG (ref 26.5–33)
MCHC RBC AUTO-ENTMCNC: 31.1 G/DL (ref 31.5–36.5)
MCV RBC AUTO: 70 FL (ref 78–100)
MONOCYTES # BLD AUTO: 0.4 10E3/UL (ref 0–1.3)
MONOCYTES NFR BLD AUTO: 10 %
NEUTROPHILS # BLD AUTO: 2.8 10E3/UL (ref 1.6–8.3)
NEUTROPHILS NFR BLD AUTO: 62 %
NONHDLC SERPL-MCNC: 147 MG/DL
PLATELET # BLD AUTO: 323 10E3/UL (ref 150–450)
POTASSIUM SERPL-SCNC: 4.8 MMOL/L (ref 3.4–5.3)
PROT SERPL-MCNC: 7.4 G/DL (ref 6.4–8.3)
RBC # BLD AUTO: 5.33 10E6/UL (ref 3.8–5.2)
SODIUM SERPL-SCNC: 140 MMOL/L (ref 135–145)
TRIGL SERPL-MCNC: 44 MG/DL
TSH SERPL DL<=0.005 MIU/L-ACNC: 2.38 UIU/ML (ref 0.3–4.2)
WBC # BLD AUTO: 4.5 10E3/UL (ref 4–11)

## 2025-06-26 NOTE — PROGRESS NOTES
"Preventive Care Visit  [facility] SOURAV Hdz CNP, Internal Medicine  Jun 26, 2025      Assessment & Plan     (Z00.00) Routine general medical examination at a health care facility  (primary encounter diagnosis)  Comment: Patient seen in clinic today for wellness exam. Patient denied any concerns this visit. Discussed need for labs and screenings.   Plan: REVIEW OF HEALTH MAINTENANCE PROTOCOL ORDERS            (I10) Hypertension goal BP (blood pressure) < 140/90  Comment: Chronic, stable. Blood pressure in goal, 124/84 at visit. Discussed need for lab this visit.   Plan: Comprehensive metabolic panel (BMP + Alb, Alk         Phos, ALT, AST, Total. Bili, TP)        Pending     (E78.00) High cholesterol  Comment: Chronic, stable. Discussed need for lab.   Plan: Lipid panel reflex to direct LDL Non-fasting        Prescription sent to pharmacy    (Z13.29) Screening for endocrine disorder  Comment: Discussed recommendation to screen.   Plan: TSH with free T4 reflex        Pending     (D64.9) Anemia, unspecified type  Comment: Chronic, stable. Discussed need for labs.   Plan: CBC with platelets and differential,   Labs show anemia   Iron and         iron binding capacity, Ferritin        Pending   (E66.01) Morbid obesity (H)  Comment: Chronic, stable.     (E66.813) Class 3 obesity (H)  Comment: Chronic, stable.     Patient has been advised of split billing requirements and indicates understanding: Yes        BMI  Estimated body mass index is 42.47 kg/m  as calculated from the following:    Height as of this encounter: 1.778 m (5' 10\").    Weight as of this encounter: 134.3 kg (296 lb).     Reviewed preventive health counseling, as reflected in patient instructions       Regular exercise       Healthy diet/nutrition  Counseling  Appropriate preventive services were addressed with this patient via screening, questionnaire, or discussion as appropriate for fall prevention, nutrition, " physical activity, Tobacco-use cessation, social engagement, weight loss and cognition.  Checklist reviewing preventive services available has been given to the patient.  Reviewed patient's diet, addressing concerns and/or questions.   She is at risk for lack of exercise and has been provided with information to increase physical activity for the benefit of her well-being.         Godwin Callejas is a 40 year old, presenting for the following:  Physical        6/26/2025     8:48 AM   Additional Questions   Roomed by Tejal BROWN  Physical         Advance Care Planning    Discussed advance care planning with patient; however, patient declined at this time.        6/25/2025   General Health   How would you rate your overall physical health? Good   Feel stress (tense, anxious, or unable to sleep) Not at all         6/25/2025   Nutrition   Three or more servings of calcium each day? Yes   Diet: Regular (no restrictions)   How many servings of fruit and vegetables per day? (!) 2-3   How many sweetened beverages each day? (!) 2         6/25/2025   Exercise   Days per week of moderate/strenous exercise 1 day   Average minutes spent exercising at this level Patient declined   (!) EXERCISE CONCERN      6/25/2025   Social Factors   Frequency of gathering with friends or relatives Twice a week   Worry food won't last until get money to buy more No   Food not last or not have enough money for food? No   Do you have housing? (Housing is defined as stable permanent housing and does not include staying outside in a car, in a tent, in an abandoned building, in an overnight shelter, or couch-surfing.) No   Are you worried about losing your housing? No   Lack of transportation? No   Unable to get utilities (heat,electricity)? No   Want help with housing or utility concern? No   (!) HOUSING CONCERN PRESENT      6/25/2025   Dental   Dentist two times every year? (!) DECLINE         Today's PHQ-2 Score:       6/12/2025     9:53  AM   PHQ-2 (  [employer])   Q1: Little interest or pleasure in doing things 0   Q2: Feeling down, depressed or hopeless 0   PHQ-2 Score 0    Q1: Little interest or pleasure in doing things Not at all   Q2: Feeling down, depressed or hopeless Not at all   PHQ-2 Score 0       Patient-reported           2025   Substance Use   Alcohol more than 3/day or more than 7/wk No   Do you use any other substances recreationally? No     Social History     Tobacco Use    Smoking status: Former     Current packs/day: 0.00     Average packs/day: 1 pack/day for 11.4 years (11.4 ttl pk-yrs)     Types: Cigarettes     Start date: 1998     Quit date: 2009     Years since quittin.1    Smokeless tobacco: Former   Vaping Use    Vaping status: Never Used   Substance Use Topics    Alcohol use: Not Currently     Alcohol/week: 2.0 standard drinks of alcohol     Types: 2 Glasses of wine per week     Comment: MONTHLY    Drug use: No     Comment: Had smoked marijuna in the past, last time in           Mammogram Screening - Mammogram every 1-2 years updated in Health Maintenance based on mutual decision making        2025   STI Screening   New sexual partner(s) since last STI/HIV test? No     History of abnormal Pap smear: No - age 30-64 HPV with reflex Pap every 5 years recommended        Latest Ref Rng & Units 2024    11:04 AM 3/2/2021     8:31 AM 3/2/2021     8:28 AM   PAP / HPV   PAP  Negative for Intraepithelial Lesion or Malignancy (NILM)      PAP (Historical)    NIL    HPV 16 DNA Negative Negative  Negative     HPV 18 DNA Negative Negative  Negative     Other HR HPV Negative Negative  Negative       ASCVD Risk   The 10-year ASCVD risk score (Ezekiel BHAGAT, et al., 2019) is: 0.3%    Values used to calculate the score:      Age: 40 years      Sex: Female      Is Non- : Yes      Diabetic: No      Tobacco smoker: No      Systolic Blood Pressure: 124 mmHg      Is BP treated: No       "HDL Cholesterol: 65 mg/dL      Total Cholesterol: 202 mg/dL        6/25/2025   Contraception/Family Planning   Questions about contraception or family planning (!) DECLINE   What are your periods like? Regular        Reviewed and updated as needed this visit by Provider                    Lab work is in process  Labs reviewed in EPIC      Review of Systems  Constitutional, HEENT, cardiovascular, pulmonary, gi and gu systems are negative, except as otherwise noted.     Objective    Exam  /84   Pulse 76   Temp 97.3  F (36.3  C) (Temporal)   Resp 20   Ht 1.778 m (5' 10\")   Wt 134.3 kg (296 lb)   LMP 06/22/2025 (Exact Date)   SpO2 99%   BMI 42.47 kg/m     Estimated body mass index is 42.47 kg/m  as calculated from the following:    Height as of this encounter: 1.778 m (5' 10\").    Weight as of this encounter: 134.3 kg (296 lb).    Physical Exam  GENERAL: alert and no distress  EYES: Eyes grossly normal to inspection, PERRL and conjunctivae and sclerae normal  HENT: ear canals and TM's normal, nose and mouth without ulcers or lesions  NECK: no adenopathy, no asymmetry, masses, or scars  RESP: lungs clear to auscultation - no rales, rhonchi or wheezes  CV: regular rate and rhythm, normal S1 S2, no S3 or S4, no murmur, click or rub, no peripheral edema  ABDOMEN: soft, nontender, no hepatosplenomegaly, no masses and bowel sounds normal  MS: no gross musculoskeletal defects noted, no edema  SKIN: no suspicious lesions or rashes  NEURO: Normal strength and tone, mentation intact and speech normal  PSYCH: mentation appears normal, affect normal/bright      The longitudinal plan of care for the diagnosis(es)/condition(s) as documented were addressed during this visit. Due to the added complexity in care, I will continue to support Britta in the subsequent management and with ongoing continuity of care.  Signed Electronically by: SOURAV Justin CNP    "

## 2025-06-26 NOTE — PATIENT INSTRUCTIONS
[facility] [place] Diet     Patient Education   Preventive Care Advice   This is general advice given by our system to help you stay healthy. However, your care team may have specific advice just for you. Please talk to your care team about your preventive care needs.  Nutrition  Eat 5 or more servings of fruits and vegetables each day.  Try wheat bread, brown rice and whole grain pasta (instead of white bread, rice, and pasta).  Get enough calcium and vitamin D. Check the label on foods and aim for 100% of the RDA (recommended daily allowance).  Lifestyle  Exercise at least 150 minutes each week  (30 minutes a day, 5 days a week).  Do muscle strengthening activities 2 days a week. These help control your weight and prevent disease.  No smoking.  Wear sunscreen to prevent skin cancer.  Have a dental exam and cleaning every 6 months.  Yearly exams  See your health care team every year to talk about:  Any changes in your health.  Any medicines your care team has prescribed.  Preventive care, family planning, and ways to prevent chronic diseases.  Shots (vaccines)   HPV shots (up to age 26), if you've never had them before.  Hepatitis B shots (up to age 59), if you've never had them before.  COVID-19 shot: Get this shot when it's due.  Flu shot: Get a flu shot every year.  Tetanus shot: Get a tetanus shot every 10 years.  Pneumococcal, hepatitis A, and RSV shots: Ask your care team if you need these based on your risk.  Shingles shot (for age 50 and up)  General health tests  Diabetes screening:  Starting at age 35, Get screened for diabetes at least every 3 years.  If you are younger than age 35, ask your care team if you should be screened for diabetes.  Cholesterol test: At age 39, start having a cholesterol test every 5 years, or more often if advised.  Bone density scan (DEXA): At age 50, ask your care team if you should have this scan for osteoporosis (brittle bones).  Hepatitis C: Get tested at least  once in your life.  STIs (sexually transmitted infections)  Before age 24: Ask your care team if you should be screened for STIs.  After age 24: Get screened for STIs if you're at risk. You are at risk for STIs (including HIV) if:  You are sexually active with more than one person.  You don't use condoms every time.  You or a partner was diagnosed with a sexually transmitted infection.  If you are at risk for HIV, ask about PrEP medicine to prevent HIV.  Get tested for HIV at least once in your life, whether you are at risk for HIV or not.  Cancer screening tests  Cervical cancer screening: If you have a cervix, begin getting regular cervical cancer screening tests starting at age 21.  Breast cancer scan (mammogram): If you've ever had breasts, begin having regular mammograms starting at age 40. This is a scan to check for breast cancer.  Colon cancer screening: It is important to start screening for colon cancer at age 45.  Have a colonoscopy test every 10 years (or more often if you're at risk) Or, ask your provider about stool tests like a FIT test every year or Cologuard test every 3 years.  To learn more about your testing options, visit:   .  For help making a decision, visit:   https://bit.ly/xa28435.  Prostate cancer screening test: If you have a prostate, ask your care team if a prostate cancer screening test (PSA) at age 55 is right for you.  Lung cancer screening: If you are a current or former smoker ages 50 to 80, ask your care team if ongoing lung cancer screenings are right for you.  For informational purposes only. Not to replace the advice of your health care provider. Copyright   2023 [employer]. All rights reserved. Clinically reviewed by the [facility] Transitions Program. [employer] 082918 - REV 01/24.

## 2025-06-27 ENCOUNTER — RESULTS FOLLOW-UP (OUTPATIENT)
Dept: FAMILY MEDICINE | Facility: CLINIC | Age: 40
End: 2025-06-27

## 2025-07-09 ENCOUNTER — ANCILLARY PROCEDURE (OUTPATIENT)
Dept: MAMMOGRAPHY | Facility: CLINIC | Age: 40
End: 2025-07-09
Payer: COMMERCIAL

## 2025-07-09 DIAGNOSIS — Z12.31 VISIT FOR SCREENING MAMMOGRAM: ICD-10-CM

## 2025-07-09 PROCEDURE — 77067 SCR MAMMO BI INCL CAD: CPT | Mod: TC | Performed by: RADIOLOGY

## 2025-07-09 PROCEDURE — 77063 BREAST TOMOSYNTHESIS BI: CPT | Mod: TC | Performed by: RADIOLOGY

## 2025-08-26 ENCOUNTER — LAB (OUTPATIENT)
Dept: INFUSION THERAPY | Facility: CLINIC | Age: 40
End: 2025-08-26
Attending: PSYCHIATRY & NEUROLOGY
Payer: COMMERCIAL

## 2025-08-26 VITALS
WEIGHT: 293 LBS | RESPIRATION RATE: 16 BRPM | DIASTOLIC BLOOD PRESSURE: 85 MMHG | HEART RATE: 60 BPM | OXYGEN SATURATION: 98 % | SYSTOLIC BLOOD PRESSURE: 128 MMHG | TEMPERATURE: 98.1 F | BODY MASS INDEX: 42.66 KG/M2

## 2025-08-26 DIAGNOSIS — G35 MS (MULTIPLE SCLEROSIS) (H): Primary | ICD-10-CM

## 2025-08-26 DIAGNOSIS — G35 MS (MULTIPLE SCLEROSIS) (H): ICD-10-CM

## 2025-08-26 DIAGNOSIS — E55.9 VITAMIN D DEFICIENCY: ICD-10-CM

## 2025-08-26 LAB
ALBUMIN SERPL BCG-MCNC: 4.1 G/DL (ref 3.5–5.2)
ALP SERPL-CCNC: 95 U/L (ref 40–150)
ALT SERPL W P-5'-P-CCNC: <5 U/L (ref 0–50)
AST SERPL W P-5'-P-CCNC: 17 U/L (ref 0–45)
BASOPHILS # BLD AUTO: 0.03 10E3/UL (ref 0–0.2)
BASOPHILS NFR BLD AUTO: 0.7 %
BILIRUB SERPL-MCNC: 0.2 MG/DL
BILIRUBIN DIRECT (ROCHE PRO & PURE): 0.11 MG/DL (ref 0–0.45)
EOSINOPHIL # BLD AUTO: 0.11 10E3/UL (ref 0–0.7)
EOSINOPHIL NFR BLD AUTO: 2.4 %
ERYTHROCYTE [DISTWIDTH] IN BLOOD BY AUTOMATED COUNT: 15.7 % (ref 10–15)
HCT VFR BLD AUTO: 36.5 % (ref 35–47)
HGB BLD-MCNC: 11.7 G/DL (ref 11.7–15.7)
IMM GRANULOCYTES # BLD: <0.03 10E3/UL
IMM GRANULOCYTES NFR BLD: 0.2 %
LYMPHOCYTES # BLD AUTO: 1.5 10E3/UL (ref 0.8–5.3)
LYMPHOCYTES NFR BLD AUTO: 33 %
MCH RBC QN AUTO: 21.9 PG (ref 26.5–33)
MCHC RBC AUTO-ENTMCNC: 32.1 G/DL (ref 31.5–36.5)
MCV RBC AUTO: 68.2 FL (ref 78–100)
MONOCYTES # BLD AUTO: 0.41 10E3/UL (ref 0–1.3)
MONOCYTES NFR BLD AUTO: 9 %
NEUTROPHILS # BLD AUTO: 2.48 10E3/UL (ref 1.6–8.3)
NEUTROPHILS NFR BLD AUTO: 54.7 %
NRBC # BLD AUTO: <0.03 10E3/UL
NRBC BLD AUTO-RTO: 0 /100
PLATELET # BLD AUTO: 268 10E3/UL (ref 150–450)
PROT SERPL-MCNC: 7.3 G/DL (ref 6.4–8.3)
RBC # BLD AUTO: 5.35 10E6/UL (ref 3.8–5.2)
VIT D+METAB SERPL-MCNC: 39 NG/ML (ref 20–50)
WBC # BLD AUTO: 4.54 10E3/UL (ref 4–11)

## 2025-08-26 PROCEDURE — 258N000003 HC RX IP 258 OP 636: Performed by: PSYCHIATRY & NEUROLOGY

## 2025-08-26 PROCEDURE — 96375 TX/PRO/DX INJ NEW DRUG ADDON: CPT

## 2025-08-26 PROCEDURE — 250N000013 HC RX MED GY IP 250 OP 250 PS 637: Performed by: PSYCHIATRY & NEUROLOGY

## 2025-08-26 PROCEDURE — 85004 AUTOMATED DIFF WBC COUNT: CPT

## 2025-08-26 PROCEDURE — 84155 ASSAY OF PROTEIN SERUM: CPT

## 2025-08-26 PROCEDURE — 82306 VITAMIN D 25 HYDROXY: CPT

## 2025-08-26 PROCEDURE — 82784 ASSAY IGA/IGD/IGG/IGM EACH: CPT

## 2025-08-26 PROCEDURE — 96365 THER/PROPH/DIAG IV INF INIT: CPT

## 2025-08-26 PROCEDURE — 250N000011 HC RX IP 250 OP 636: Mod: JZ | Performed by: PSYCHIATRY & NEUROLOGY

## 2025-08-26 PROCEDURE — 99207 PR NO CHARGE LOS: CPT

## 2025-08-26 PROCEDURE — 96366 THER/PROPH/DIAG IV INF ADDON: CPT

## 2025-08-26 RX ORDER — METHYLPREDNISOLONE SODIUM SUCCINATE 40 MG/ML
40 INJECTION INTRAMUSCULAR; INTRAVENOUS
Start: 2026-02-21

## 2025-08-26 RX ORDER — HEPARIN SODIUM,PORCINE 10 UNIT/ML
5-20 VIAL (ML) INTRAVENOUS DAILY PRN
OUTPATIENT
Start: 2026-02-21

## 2025-08-26 RX ORDER — MEPERIDINE HYDROCHLORIDE 25 MG/ML
25 INJECTION INTRAMUSCULAR; INTRAVENOUS; SUBCUTANEOUS
OUTPATIENT
Start: 2026-02-21

## 2025-08-26 RX ORDER — ALBUTEROL SULFATE 90 UG/1
1-2 INHALANT RESPIRATORY (INHALATION)
Start: 2026-02-21

## 2025-08-26 RX ORDER — METHYLPREDNISOLONE SODIUM SUCCINATE 125 MG/2ML
125 INJECTION INTRAMUSCULAR; INTRAVENOUS ONCE
Status: COMPLETED | OUTPATIENT
Start: 2025-08-26 | End: 2025-08-26

## 2025-08-26 RX ORDER — DIPHENHYDRAMINE HYDROCHLORIDE 50 MG/ML
25 INJECTION, SOLUTION INTRAMUSCULAR; INTRAVENOUS
Start: 2026-02-21

## 2025-08-26 RX ORDER — ALBUTEROL SULFATE 0.83 MG/ML
2.5 SOLUTION RESPIRATORY (INHALATION)
OUTPATIENT
Start: 2026-02-21

## 2025-08-26 RX ORDER — HEPARIN SODIUM (PORCINE) LOCK FLUSH IV SOLN 100 UNIT/ML 100 UNIT/ML
5 SOLUTION INTRAVENOUS
OUTPATIENT
Start: 2026-02-21

## 2025-08-26 RX ORDER — DIPHENHYDRAMINE HYDROCHLORIDE 50 MG/ML
50 INJECTION, SOLUTION INTRAMUSCULAR; INTRAVENOUS
Start: 2026-02-21

## 2025-08-26 RX ORDER — ACETAMINOPHEN 325 MG/1
650 TABLET ORAL ONCE
OUTPATIENT
Start: 2026-02-21

## 2025-08-26 RX ORDER — DIPHENHYDRAMINE HCL 25 MG
50 CAPSULE ORAL ONCE
OUTPATIENT
Start: 2026-02-21

## 2025-08-26 RX ORDER — DIPHENHYDRAMINE HCL 25 MG
50 CAPSULE ORAL ONCE
Status: COMPLETED | OUTPATIENT
Start: 2025-08-26 | End: 2025-08-26

## 2025-08-26 RX ORDER — ACETAMINOPHEN 325 MG/1
650 TABLET ORAL ONCE
Status: COMPLETED | OUTPATIENT
Start: 2025-08-26 | End: 2025-08-26

## 2025-08-26 RX ORDER — EPINEPHRINE 1 MG/ML
0.3 INJECTION, SOLUTION INTRAMUSCULAR; SUBCUTANEOUS EVERY 5 MIN PRN
OUTPATIENT
Start: 2026-02-21

## 2025-08-26 RX ORDER — METHYLPREDNISOLONE SODIUM SUCCINATE 125 MG/2ML
125 INJECTION INTRAMUSCULAR; INTRAVENOUS ONCE
OUTPATIENT
Start: 2026-02-21

## 2025-08-26 RX ADMIN — METHYLPREDNISOLONE SODIUM SUCCINATE 125 MG: 125 INJECTION, POWDER, FOR SOLUTION INTRAMUSCULAR; INTRAVENOUS at 08:54

## 2025-08-26 RX ADMIN — ACETAMINOPHEN 650 MG: 325 TABLET ORAL at 08:51

## 2025-08-26 RX ADMIN — SODIUM CHLORIDE 250 ML: 0.9 INJECTION, SOLUTION INTRAVENOUS at 08:53

## 2025-08-26 RX ADMIN — DIPHENHYDRAMINE HYDROCHLORIDE 50 MG: 25 CAPSULE ORAL at 08:51

## 2025-08-26 RX ADMIN — OCRELIZUMAB 600 MG: 300 INJECTION INTRAVENOUS at 09:03

## 2025-08-27 LAB — IGG SERPL-MCNC: 1118 MG/DL (ref 610–1616)
